# Patient Record
Sex: MALE | Race: WHITE | NOT HISPANIC OR LATINO | Employment: OTHER | RURAL
[De-identification: names, ages, dates, MRNs, and addresses within clinical notes are randomized per-mention and may not be internally consistent; named-entity substitution may affect disease eponyms.]

---

## 2021-04-19 ENCOUNTER — HISTORICAL (OUTPATIENT)
Dept: ADMINISTRATIVE | Facility: HOSPITAL | Age: 51
End: 2021-04-19

## 2021-04-19 LAB
ALBUMIN SERPL BCP-MCNC: 4 G/DL (ref 3.4–5)
ALBUMIN/GLOB SERPL: 1 {RATIO}
ALP SERPL-CCNC: 112 U/L (ref 50–136)
ALT SERPL W P-5'-P-CCNC: 48 U/L (ref 12–78)
ANION GAP SERPL CALCULATED.3IONS-SCNC: 8 MMOL/L
AST SERPL W P-5'-P-CCNC: 28 U/L (ref 15–37)
BASOPHILS # BLD AUTO: 0.05 X10E3/UL (ref 0–0.2)
BASOPHILS NFR BLD AUTO: 0.4 % (ref 0–1)
BILIRUB SERPL-MCNC: 0.3 MG/DL (ref 0.2–1)
BUN SERPL-MCNC: 13 MG/DL (ref 7–18)
CALCIUM SERPL-MCNC: 9.6 MG/DL (ref 8.5–10.1)
CHLORIDE SERPL-SCNC: 101 MMOL/L (ref 101–111)
CK SERPL-CCNC: 252 U/L (ref 26–308)
CO2 SERPL-SCNC: 32 MMOL/L (ref 21–32)
CREAT SERPL-MCNC: 0.9 MG/DL (ref 0.6–1.3)
CRP SERPL-MCNC: 1 MG/DL (ref 0–0.8)
EOSINOPHIL # BLD AUTO: 0.47 X10E3/UL (ref 0–0.5)
EOSINOPHIL NFR BLD AUTO: 3.6 % (ref 1–4)
EOSINOPHIL NFR BLD MANUAL: 2 % (ref 1–4)
ERYTHROCYTE [DISTWIDTH] IN BLOOD BY AUTOMATED COUNT: 13.9 % (ref 11.5–14.5)
ERYTHROCYTE [SEDIMENTATION RATE] IN BLOOD BY WESTERGREN METHOD: 5 MM/H
GLOBULIN SER-MCNC: 3.4 G/DL
GLUCOSE SERPL-MCNC: 253 MG/DL (ref 74–106)
HCT VFR BLD AUTO: 45 % (ref 40–54)
HGB BLD-MCNC: 16 G/DL (ref 13.5–18)
LYMPHOCYTES # BLD AUTO: 3.49 X10E3/UL (ref 1–4.8)
LYMPHOCYTES NFR BLD AUTO: 26.8 % (ref 27–41)
LYMPHOCYTES NFR BLD MANUAL: 26 % (ref 27–41)
MCH RBC QN AUTO: 34.2 PG (ref 27–31)
MCHC RBC AUTO-ENTMCNC: 35.6 G/DL (ref 32–36)
MCV RBC AUTO: 96 FL (ref 80–96)
MONOCYTES # BLD AUTO: 0.77 X10E3/UL (ref 0–0.8)
MONOCYTES NFR BLD AUTO: 5.9 % (ref 2–6)
MONOCYTES NFR BLD MANUAL: 5 % (ref 2–6)
MPC BLD CALC-MCNC: 12.4 FL (ref 9.4–12.4)
NEUTROPHILS # BLD AUTO: 8.25 X10E3/UL (ref 1.8–7.7)
NEUTROPHILS NFR BLD AUTO: 63.3 % (ref 53–65)
NEUTS BAND NFR BLD MANUAL: 1 % (ref 1–5)
NEUTS SEG NFR BLD MANUAL: 66 % (ref 50–62)
PLATELET # BLD AUTO: 197 X10E3/UL (ref 150–400)
POTASSIUM SERPL-SCNC: 5.2 MMOL/L (ref 3.6–5)
PROT SERPL-MCNC: 7.4 G/DL (ref 6.4–8.2)
RBC # BLD AUTO: 4.68 X10E6/UL (ref 4.6–6.2)
SODIUM SERPL-SCNC: 136 MMOL/L (ref 135–145)
WBC # BLD AUTO: 13.03 X10E3/UL (ref 4.5–11)

## 2021-04-26 ENCOUNTER — HISTORICAL (OUTPATIENT)
Dept: ADMINISTRATIVE | Facility: HOSPITAL | Age: 51
End: 2021-04-26

## 2021-04-26 LAB
ALBUMIN SERPL BCP-MCNC: 3.9 G/DL (ref 3.4–5)
ALBUMIN/GLOB SERPL: 1 {RATIO}
ALP SERPL-CCNC: 102 U/L (ref 50–136)
ALT SERPL W P-5'-P-CCNC: 43 U/L (ref 12–78)
ANION GAP SERPL CALCULATED.3IONS-SCNC: 16 MMOL/L
AST SERPL W P-5'-P-CCNC: 20 U/L (ref 15–37)
BASOPHILS # BLD AUTO: 0.06 X10E3/UL (ref 0–0.2)
BASOPHILS NFR BLD AUTO: 0.5 % (ref 0–1)
BILIRUB SERPL-MCNC: 0.3 MG/DL (ref 0.2–1)
BUN SERPL-MCNC: 16 MG/DL (ref 7–18)
CALCIUM SERPL-MCNC: 9 MG/DL (ref 8.5–10.1)
CHLORIDE SERPL-SCNC: 97 MMOL/L (ref 101–111)
CK SERPL-CCNC: 93 U/L (ref 26–308)
CO2 SERPL-SCNC: 26 MMOL/L (ref 21–32)
CREAT SERPL-MCNC: 1.1 MG/DL (ref 0.6–1.3)
CRP SERPL-MCNC: 0.49 MG/DL (ref 0–0.8)
EOSINOPHIL # BLD AUTO: 0.37 X10E3/UL (ref 0–0.5)
EOSINOPHIL NFR BLD AUTO: 3.1 % (ref 1–4)
ERYTHROCYTE [DISTWIDTH] IN BLOOD BY AUTOMATED COUNT: 14.1 % (ref 11.5–14.5)
ERYTHROCYTE [SEDIMENTATION RATE] IN BLOOD BY WESTERGREN METHOD: 8 MM/H
GLOBULIN SER-MCNC: 3.3 G/DL
GLUCOSE SERPL-MCNC: 284 MG/DL (ref 74–106)
HCT VFR BLD AUTO: 44.2 % (ref 40–54)
HGB BLD-MCNC: 15 G/DL (ref 13.5–18)
LYMPHOCYTES # BLD AUTO: 2.89 X10E3/UL (ref 1–4.8)
LYMPHOCYTES NFR BLD AUTO: 23.9 % (ref 27–41)
MCH RBC QN AUTO: 32.8 PG (ref 27–31)
MCHC RBC AUTO-ENTMCNC: 33.9 G/DL (ref 32–36)
MCV RBC AUTO: 97 FL (ref 80–96)
MONOCYTES # BLD AUTO: 0.7 X10E3/UL (ref 0–0.8)
MONOCYTES NFR BLD AUTO: 5.8 % (ref 2–6)
MPC BLD CALC-MCNC: 12 FL (ref 9.4–12.4)
NEUTROPHILS # BLD AUTO: 8.08 X10E3/UL (ref 1.8–7.7)
NEUTROPHILS NFR BLD AUTO: 66.7 % (ref 53–65)
PLATELET # BLD AUTO: 213 X10E3/UL (ref 150–400)
POTASSIUM SERPL-SCNC: 4.3 MMOL/L (ref 3.6–5)
PROT SERPL-MCNC: 7.2 G/DL (ref 6.4–8.2)
RBC # BLD AUTO: 4.58 X10E6/UL (ref 4.6–6.2)
SODIUM SERPL-SCNC: 135 MMOL/L (ref 135–145)
WBC # BLD AUTO: 12.1 X10E3/UL (ref 4.5–11)

## 2021-08-02 ENCOUNTER — OFFICE VISIT (OUTPATIENT)
Dept: FAMILY MEDICINE | Facility: CLINIC | Age: 51
End: 2021-08-02
Payer: COMMERCIAL

## 2021-08-02 VITALS
OXYGEN SATURATION: 96 % | SYSTOLIC BLOOD PRESSURE: 142 MMHG | RESPIRATION RATE: 20 BRPM | DIASTOLIC BLOOD PRESSURE: 98 MMHG | HEART RATE: 85 BPM | BODY MASS INDEX: 29.23 KG/M2 | TEMPERATURE: 97 F | WEIGHT: 204.19 LBS | HEIGHT: 70 IN

## 2021-08-02 DIAGNOSIS — I10 ESSENTIAL HYPERTENSION: ICD-10-CM

## 2021-08-02 DIAGNOSIS — G56.03 CARPAL TUNNEL SYNDROME ON BOTH SIDES: ICD-10-CM

## 2021-08-02 DIAGNOSIS — E11.43 TYPE 2 DIABETES MELLITUS WITH DIABETIC AUTONOMIC NEUROPATHY, WITHOUT LONG-TERM CURRENT USE OF INSULIN: Primary | ICD-10-CM

## 2021-08-02 DIAGNOSIS — M51.9 LUMBAR DISC DISEASE: ICD-10-CM

## 2021-08-02 PROBLEM — E11.49 TYPE 2 DIABETES MELLITUS WITH NEUROLOGIC COMPLICATION, WITHOUT LONG-TERM CURRENT USE OF INSULIN: Status: ACTIVE | Noted: 2021-08-02

## 2021-08-02 LAB
ANION GAP SERPL CALCULATED.3IONS-SCNC: 10 MMOL/L (ref 7–16)
BUN SERPL-MCNC: 10 MG/DL (ref 7–18)
BUN/CREAT SERPL: 14 (ref 6–20)
CALCIUM SERPL-MCNC: 8.7 MG/DL (ref 8.5–10.1)
CHLORIDE SERPL-SCNC: 106 MMOL/L (ref 98–107)
CHOLEST SERPL-MCNC: 179 MG/DL (ref 0–200)
CHOLEST/HDLC SERPL: 7.5 {RATIO}
CO2 SERPL-SCNC: 29 MMOL/L (ref 21–32)
CREAT SERPL-MCNC: 0.73 MG/DL (ref 0.7–1.3)
CREAT UR-MCNC: 285 MG/DL (ref 39–259)
EST. AVERAGE GLUCOSE BLD GHB EST-MCNC: 117 MG/DL
GLUCOSE SERPL-MCNC: 87 MG/DL (ref 74–106)
HBA1C MFR BLD HPLC: 6.1 % (ref 4.5–6.6)
HDLC SERPL-MCNC: 24 MG/DL (ref 40–60)
LDLC SERPL CALC-MCNC: ABNORMAL MG/DL
LDLC/HDLC SERPL: ABNORMAL {RATIO}
MICROALBUMIN UR-MCNC: 8.2 MG/DL (ref 0–2.8)
MICROALBUMIN/CREAT RATIO PNL UR: 28.8 MG/G (ref 0–30)
NONHDLC SERPL-MCNC: 155 MG/DL
POTASSIUM SERPL-SCNC: 4 MMOL/L (ref 3.5–5.1)
SODIUM SERPL-SCNC: 141 MMOL/L (ref 136–145)
TRIGL SERPL-MCNC: 423 MG/DL (ref 35–150)
VLDLC SERPL-MCNC: ABNORMAL MG/DL

## 2021-08-02 PROCEDURE — 1160F PR REVIEW ALL MEDS BY PRESCRIBER/CLIN PHARMACIST DOCUMENTED: ICD-10-PCS | Mod: CPTII,,, | Performed by: FAMILY MEDICINE

## 2021-08-02 PROCEDURE — 3008F BODY MASS INDEX DOCD: CPT | Mod: CPTII,,, | Performed by: FAMILY MEDICINE

## 2021-08-02 PROCEDURE — 83036 HEMOGLOBIN GLYCOSYLATED A1C: CPT | Mod: ,,, | Performed by: CLINICAL MEDICAL LABORATORY

## 2021-08-02 PROCEDURE — 3044F HG A1C LEVEL LT 7.0%: CPT | Mod: CPTII,,, | Performed by: FAMILY MEDICINE

## 2021-08-02 PROCEDURE — 99204 PR OFFICE/OUTPT VISIT, NEW, LEVL IV, 45-59 MIN: ICD-10-PCS | Mod: ,,, | Performed by: FAMILY MEDICINE

## 2021-08-02 PROCEDURE — 1160F RVW MEDS BY RX/DR IN RCRD: CPT | Mod: CPTII,,, | Performed by: FAMILY MEDICINE

## 2021-08-02 PROCEDURE — 80061 LIPID PANEL: ICD-10-PCS | Mod: ,,, | Performed by: CLINICAL MEDICAL LABORATORY

## 2021-08-02 PROCEDURE — 1159F MED LIST DOCD IN RCRD: CPT | Mod: CPTII,,, | Performed by: FAMILY MEDICINE

## 2021-08-02 PROCEDURE — 1125F AMNT PAIN NOTED PAIN PRSNT: CPT | Mod: CPTII,,, | Performed by: FAMILY MEDICINE

## 2021-08-02 PROCEDURE — 82043 UR ALBUMIN QUANTITATIVE: CPT | Mod: ,,, | Performed by: CLINICAL MEDICAL LABORATORY

## 2021-08-02 PROCEDURE — 3008F PR BODY MASS INDEX (BMI) DOCUMENTED: ICD-10-PCS | Mod: CPTII,,, | Performed by: FAMILY MEDICINE

## 2021-08-02 PROCEDURE — 82570 ASSAY OF URINE CREATININE: CPT | Mod: ,,, | Performed by: CLINICAL MEDICAL LABORATORY

## 2021-08-02 PROCEDURE — 80048 BASIC METABOLIC PANEL: ICD-10-PCS | Mod: ,,, | Performed by: CLINICAL MEDICAL LABORATORY

## 2021-08-02 PROCEDURE — 3044F PR MOST RECENT HEMOGLOBIN A1C LEVEL <7.0%: ICD-10-PCS | Mod: CPTII,,, | Performed by: FAMILY MEDICINE

## 2021-08-02 PROCEDURE — 3080F PR MOST RECENT DIASTOLIC BLOOD PRESSURE >= 90 MM HG: ICD-10-PCS | Mod: CPTII,,, | Performed by: FAMILY MEDICINE

## 2021-08-02 PROCEDURE — 1159F PR MEDICATION LIST DOCUMENTED IN MEDICAL RECORD: ICD-10-PCS | Mod: CPTII,,, | Performed by: FAMILY MEDICINE

## 2021-08-02 PROCEDURE — 1125F PR PAIN SEVERITY QUANTIFIED, PAIN PRESENT: ICD-10-PCS | Mod: CPTII,,, | Performed by: FAMILY MEDICINE

## 2021-08-02 PROCEDURE — 80061 LIPID PANEL: CPT | Mod: ,,, | Performed by: CLINICAL MEDICAL LABORATORY

## 2021-08-02 PROCEDURE — 99204 OFFICE O/P NEW MOD 45 MIN: CPT | Mod: ,,, | Performed by: FAMILY MEDICINE

## 2021-08-02 PROCEDURE — 82570 MICROALBUMIN / CREATININE RATIO URINE: ICD-10-PCS | Mod: ,,, | Performed by: CLINICAL MEDICAL LABORATORY

## 2021-08-02 PROCEDURE — 83036 HEMOGLOBIN A1C: ICD-10-PCS | Mod: ,,, | Performed by: CLINICAL MEDICAL LABORATORY

## 2021-08-02 PROCEDURE — 3077F PR MOST RECENT SYSTOLIC BLOOD PRESSURE >= 140 MM HG: ICD-10-PCS | Mod: CPTII,,, | Performed by: FAMILY MEDICINE

## 2021-08-02 PROCEDURE — 3080F DIAST BP >= 90 MM HG: CPT | Mod: CPTII,,, | Performed by: FAMILY MEDICINE

## 2021-08-02 PROCEDURE — 82043 MICROALBUMIN / CREATININE RATIO URINE: ICD-10-PCS | Mod: ,,, | Performed by: CLINICAL MEDICAL LABORATORY

## 2021-08-02 PROCEDURE — 3077F SYST BP >= 140 MM HG: CPT | Mod: CPTII,,, | Performed by: FAMILY MEDICINE

## 2021-08-02 PROCEDURE — 80048 BASIC METABOLIC PNL TOTAL CA: CPT | Mod: ,,, | Performed by: CLINICAL MEDICAL LABORATORY

## 2021-08-02 RX ORDER — GABAPENTIN 600 MG/1
TABLET ORAL
COMMUNITY

## 2021-08-02 RX ORDER — GLIMEPIRIDE 2 MG/1
TABLET ORAL 2 TIMES DAILY
COMMUNITY
End: 2021-08-02 | Stop reason: SDUPTHER

## 2021-08-02 RX ORDER — METFORMIN HYDROCHLORIDE 500 MG/1
1000 TABLET, EXTENDED RELEASE ORAL 2 TIMES DAILY WITH MEALS
Qty: 180 TABLET | Refills: 1 | Status: SHIPPED | OUTPATIENT
Start: 2021-08-02 | End: 2022-02-16 | Stop reason: SDUPTHER

## 2021-08-02 RX ORDER — HYDROCODONE BITARTRATE AND ACETAMINOPHEN 10; 325 MG/1; MG/1
TABLET ORAL
COMMUNITY
End: 2023-05-05 | Stop reason: ALTCHOICE

## 2021-08-02 RX ORDER — FLASH GLUCOSE SCANNING READER
1 EACH MISCELLANEOUS 4 TIMES DAILY
Qty: 1 EACH | Refills: 5 | Status: SHIPPED | OUTPATIENT
Start: 2021-08-02 | End: 2021-10-20

## 2021-08-02 RX ORDER — BACLOFEN 20 MG/1
20 TABLET ORAL EVERY 6 HOURS PRN
COMMUNITY

## 2021-08-02 RX ORDER — FLASH GLUCOSE SENSOR
1 KIT MISCELLANEOUS 4 TIMES DAILY
Qty: 6 KIT | Refills: 5 | Status: SHIPPED | OUTPATIENT
Start: 2021-08-02 | End: 2021-10-20

## 2021-08-02 RX ORDER — GLIMEPIRIDE 2 MG/1
4 TABLET ORAL 2 TIMES DAILY
Qty: 180 TABLET | Refills: 1 | Status: SHIPPED | OUTPATIENT
Start: 2021-08-02 | End: 2022-02-16 | Stop reason: SDUPTHER

## 2021-08-02 RX ORDER — ROPINIROLE 2 MG/1
TABLET, FILM COATED ORAL DAILY
COMMUNITY
End: 2021-10-20

## 2021-08-02 RX ORDER — METFORMIN HYDROCHLORIDE 500 MG/1
TABLET, EXTENDED RELEASE ORAL 4 TIMES DAILY
COMMUNITY
End: 2021-08-02 | Stop reason: SDUPTHER

## 2021-08-08 PROBLEM — I10 ESSENTIAL HYPERTENSION: Status: ACTIVE | Noted: 2021-08-08

## 2021-08-08 RX ORDER — AMLODIPINE BESYLATE 5 MG/1
5 TABLET ORAL DAILY
Qty: 90 TABLET | Refills: 1 | Status: SHIPPED | OUTPATIENT
Start: 2021-08-08 | End: 2022-02-16 | Stop reason: SDUPTHER

## 2021-08-12 ENCOUNTER — PATIENT MESSAGE (OUTPATIENT)
Dept: FAMILY MEDICINE | Facility: CLINIC | Age: 51
End: 2021-08-12

## 2021-08-16 DIAGNOSIS — E11.29 MICROALBUMINURIA DUE TO TYPE 2 DIABETES MELLITUS: Primary | ICD-10-CM

## 2021-08-16 DIAGNOSIS — R80.9 MICROALBUMINURIA DUE TO TYPE 2 DIABETES MELLITUS: Primary | ICD-10-CM

## 2021-10-20 ENCOUNTER — OFFICE VISIT (OUTPATIENT)
Dept: FAMILY MEDICINE | Facility: CLINIC | Age: 51
End: 2021-10-20
Payer: COMMERCIAL

## 2021-10-20 VITALS
WEIGHT: 206.63 LBS | TEMPERATURE: 98 F | SYSTOLIC BLOOD PRESSURE: 128 MMHG | RESPIRATION RATE: 22 BRPM | DIASTOLIC BLOOD PRESSURE: 74 MMHG | BODY MASS INDEX: 29.58 KG/M2 | HEART RATE: 84 BPM | OXYGEN SATURATION: 98 % | HEIGHT: 70 IN

## 2021-10-20 DIAGNOSIS — H60.391 OTHER INFECTIVE ACUTE OTITIS EXTERNA OF RIGHT EAR: Primary | ICD-10-CM

## 2021-10-20 PROBLEM — H60.501 ACUTE OTITIS EXTERNA OF RIGHT EAR: Status: ACTIVE | Noted: 2021-10-20

## 2021-10-20 PROCEDURE — 3074F PR MOST RECENT SYSTOLIC BLOOD PRESSURE < 130 MM HG: ICD-10-PCS | Mod: CPTII,,, | Performed by: FAMILY MEDICINE

## 2021-10-20 PROCEDURE — 3008F BODY MASS INDEX DOCD: CPT | Mod: CPTII,,, | Performed by: FAMILY MEDICINE

## 2021-10-20 PROCEDURE — 3061F PR NEG MICROALBUMINURIA RESULT DOCUMENTED/REVIEW: ICD-10-PCS | Mod: CPTII,,, | Performed by: FAMILY MEDICINE

## 2021-10-20 PROCEDURE — 3061F NEG MICROALBUMINURIA REV: CPT | Mod: CPTII,,, | Performed by: FAMILY MEDICINE

## 2021-10-20 PROCEDURE — 3074F SYST BP LT 130 MM HG: CPT | Mod: CPTII,,, | Performed by: FAMILY MEDICINE

## 2021-10-20 PROCEDURE — 1159F PR MEDICATION LIST DOCUMENTED IN MEDICAL RECORD: ICD-10-PCS | Mod: CPTII,,, | Performed by: FAMILY MEDICINE

## 2021-10-20 PROCEDURE — 3044F HG A1C LEVEL LT 7.0%: CPT | Mod: CPTII,,, | Performed by: FAMILY MEDICINE

## 2021-10-20 PROCEDURE — 3078F PR MOST RECENT DIASTOLIC BLOOD PRESSURE < 80 MM HG: ICD-10-PCS | Mod: CPTII,,, | Performed by: FAMILY MEDICINE

## 2021-10-20 PROCEDURE — 99213 OFFICE O/P EST LOW 20 MIN: CPT | Mod: ,,, | Performed by: FAMILY MEDICINE

## 2021-10-20 PROCEDURE — 3044F PR MOST RECENT HEMOGLOBIN A1C LEVEL <7.0%: ICD-10-PCS | Mod: CPTII,,, | Performed by: FAMILY MEDICINE

## 2021-10-20 PROCEDURE — 3078F DIAST BP <80 MM HG: CPT | Mod: CPTII,,, | Performed by: FAMILY MEDICINE

## 2021-10-20 PROCEDURE — 3066F PR DOCUMENTATION OF TREATMENT FOR NEPHROPATHY: ICD-10-PCS | Mod: CPTII,,, | Performed by: FAMILY MEDICINE

## 2021-10-20 PROCEDURE — 99213 PR OFFICE/OUTPT VISIT, EST, LEVL III, 20-29 MIN: ICD-10-PCS | Mod: ,,, | Performed by: FAMILY MEDICINE

## 2021-10-20 PROCEDURE — 1159F MED LIST DOCD IN RCRD: CPT | Mod: CPTII,,, | Performed by: FAMILY MEDICINE

## 2021-10-20 PROCEDURE — 3008F PR BODY MASS INDEX (BMI) DOCUMENTED: ICD-10-PCS | Mod: CPTII,,, | Performed by: FAMILY MEDICINE

## 2021-10-20 PROCEDURE — 3066F NEPHROPATHY DOC TX: CPT | Mod: CPTII,,, | Performed by: FAMILY MEDICINE

## 2021-10-20 RX ORDER — NEOMYCIN SULFATE, POLYMYXIN B SULFATE AND HYDROCORTISONE 10; 3.5; 1 MG/ML; MG/ML; [USP'U]/ML
3 SUSPENSION/ DROPS AURICULAR (OTIC) 3 TIMES DAILY
Qty: 3.5 ML | Refills: 2 | Status: SHIPPED | OUTPATIENT
Start: 2021-10-20 | End: 2021-10-27

## 2021-10-20 RX ORDER — SULFAMETHOXAZOLE AND TRIMETHOPRIM 800; 160 MG/1; MG/1
1 TABLET ORAL 2 TIMES DAILY
Qty: 14 TABLET | Refills: 0 | Status: SHIPPED | OUTPATIENT
Start: 2021-10-20 | End: 2021-11-03 | Stop reason: ALTCHOICE

## 2021-11-03 ENCOUNTER — OFFICE VISIT (OUTPATIENT)
Dept: FAMILY MEDICINE | Facility: CLINIC | Age: 51
End: 2021-11-03
Payer: COMMERCIAL

## 2021-11-03 VITALS
DIASTOLIC BLOOD PRESSURE: 78 MMHG | BODY MASS INDEX: 29.63 KG/M2 | RESPIRATION RATE: 22 BRPM | WEIGHT: 207 LBS | TEMPERATURE: 97 F | HEART RATE: 74 BPM | HEIGHT: 70 IN | SYSTOLIC BLOOD PRESSURE: 120 MMHG | OXYGEN SATURATION: 96 %

## 2021-11-03 DIAGNOSIS — J01.40 ACUTE NON-RECURRENT PANSINUSITIS: Primary | ICD-10-CM

## 2021-11-03 PROCEDURE — 3008F BODY MASS INDEX DOCD: CPT | Mod: CPTII,,, | Performed by: FAMILY MEDICINE

## 2021-11-03 PROCEDURE — 3061F NEG MICROALBUMINURIA REV: CPT | Mod: CPTII,,, | Performed by: FAMILY MEDICINE

## 2021-11-03 PROCEDURE — 99213 OFFICE O/P EST LOW 20 MIN: CPT | Mod: 25,,, | Performed by: FAMILY MEDICINE

## 2021-11-03 PROCEDURE — 99213 PR OFFICE/OUTPT VISIT, EST, LEVL III, 20-29 MIN: ICD-10-PCS | Mod: 25,,, | Performed by: FAMILY MEDICINE

## 2021-11-03 PROCEDURE — 1159F PR MEDICATION LIST DOCUMENTED IN MEDICAL RECORD: ICD-10-PCS | Mod: CPTII,,, | Performed by: FAMILY MEDICINE

## 2021-11-03 PROCEDURE — 96372 PR INJECTION,THERAP/PROPH/DIAG2ST, IM OR SUBCUT: ICD-10-PCS | Mod: ,,, | Performed by: FAMILY MEDICINE

## 2021-11-03 PROCEDURE — 3066F NEPHROPATHY DOC TX: CPT | Mod: CPTII,,, | Performed by: FAMILY MEDICINE

## 2021-11-03 PROCEDURE — 3078F PR MOST RECENT DIASTOLIC BLOOD PRESSURE < 80 MM HG: ICD-10-PCS | Mod: CPTII,,, | Performed by: FAMILY MEDICINE

## 2021-11-03 PROCEDURE — 3044F HG A1C LEVEL LT 7.0%: CPT | Mod: CPTII,,, | Performed by: FAMILY MEDICINE

## 2021-11-03 PROCEDURE — 3061F PR NEG MICROALBUMINURIA RESULT DOCUMENTED/REVIEW: ICD-10-PCS | Mod: CPTII,,, | Performed by: FAMILY MEDICINE

## 2021-11-03 PROCEDURE — 3008F PR BODY MASS INDEX (BMI) DOCUMENTED: ICD-10-PCS | Mod: CPTII,,, | Performed by: FAMILY MEDICINE

## 2021-11-03 PROCEDURE — 3074F SYST BP LT 130 MM HG: CPT | Mod: CPTII,,, | Performed by: FAMILY MEDICINE

## 2021-11-03 PROCEDURE — 3066F PR DOCUMENTATION OF TREATMENT FOR NEPHROPATHY: ICD-10-PCS | Mod: CPTII,,, | Performed by: FAMILY MEDICINE

## 2021-11-03 PROCEDURE — 1159F MED LIST DOCD IN RCRD: CPT | Mod: CPTII,,, | Performed by: FAMILY MEDICINE

## 2021-11-03 PROCEDURE — 3078F DIAST BP <80 MM HG: CPT | Mod: CPTII,,, | Performed by: FAMILY MEDICINE

## 2021-11-03 PROCEDURE — 96372 THER/PROPH/DIAG INJ SC/IM: CPT | Mod: ,,, | Performed by: FAMILY MEDICINE

## 2021-11-03 PROCEDURE — 3044F PR MOST RECENT HEMOGLOBIN A1C LEVEL <7.0%: ICD-10-PCS | Mod: CPTII,,, | Performed by: FAMILY MEDICINE

## 2021-11-03 PROCEDURE — 3074F PR MOST RECENT SYSTOLIC BLOOD PRESSURE < 130 MM HG: ICD-10-PCS | Mod: CPTII,,, | Performed by: FAMILY MEDICINE

## 2021-11-03 RX ORDER — AZITHROMYCIN 250 MG/1
TABLET, FILM COATED ORAL
Qty: 6 TABLET | Refills: 0 | Status: SHIPPED | OUTPATIENT
Start: 2021-11-03 | End: 2021-11-08

## 2021-11-03 RX ORDER — METHYLPREDNISOLONE ACETATE 40 MG/ML
40 INJECTION, SUSPENSION INTRA-ARTICULAR; INTRALESIONAL; INTRAMUSCULAR; SOFT TISSUE
Status: COMPLETED | OUTPATIENT
Start: 2021-11-03 | End: 2021-11-03

## 2021-11-03 RX ORDER — CEFTRIAXONE 1 G/1
1 INJECTION, POWDER, FOR SOLUTION INTRAMUSCULAR; INTRAVENOUS
Status: COMPLETED | OUTPATIENT
Start: 2021-11-03 | End: 2021-11-03

## 2021-11-03 RX ORDER — LORATADINE AND PSEUDOEPHEDRINE SULFATE 5; 120 MG/1; MG/1
1 TABLET, EXTENDED RELEASE ORAL 2 TIMES DAILY
Qty: 20 TABLET | Refills: 0 | Status: SHIPPED | OUTPATIENT
Start: 2021-11-03 | End: 2021-11-13

## 2021-11-03 RX ADMIN — METHYLPREDNISOLONE ACETATE 40 MG: 40 INJECTION, SUSPENSION INTRA-ARTICULAR; INTRALESIONAL; INTRAMUSCULAR; SOFT TISSUE at 04:11

## 2021-11-03 RX ADMIN — CEFTRIAXONE 1 G: 1 INJECTION, POWDER, FOR SOLUTION INTRAMUSCULAR; INTRAVENOUS at 04:11

## 2021-11-12 PROBLEM — J01.40 ACUTE NON-RECURRENT PANSINUSITIS: Status: ACTIVE | Noted: 2021-11-12

## 2022-02-14 PROBLEM — J01.40 ACUTE NON-RECURRENT PANSINUSITIS: Status: RESOLVED | Noted: 2021-11-12 | Resolved: 2022-02-14

## 2022-02-16 ENCOUNTER — OFFICE VISIT (OUTPATIENT)
Dept: FAMILY MEDICINE | Facility: CLINIC | Age: 52
End: 2022-02-16
Payer: COMMERCIAL

## 2022-02-16 VITALS
DIASTOLIC BLOOD PRESSURE: 88 MMHG | TEMPERATURE: 97 F | BODY MASS INDEX: 29.37 KG/M2 | RESPIRATION RATE: 16 BRPM | HEART RATE: 82 BPM | OXYGEN SATURATION: 97 % | HEIGHT: 70 IN | SYSTOLIC BLOOD PRESSURE: 134 MMHG | WEIGHT: 205.19 LBS

## 2022-02-16 DIAGNOSIS — M51.9 LUMBAR DISC DISEASE: Primary | ICD-10-CM

## 2022-02-16 DIAGNOSIS — E11.43 TYPE 2 DIABETES MELLITUS WITH DIABETIC AUTONOMIC NEUROPATHY, WITHOUT LONG-TERM CURRENT USE OF INSULIN: ICD-10-CM

## 2022-02-16 DIAGNOSIS — E78.2 MIXED HYPERLIPIDEMIA: ICD-10-CM

## 2022-02-16 DIAGNOSIS — I10 ESSENTIAL HYPERTENSION: ICD-10-CM

## 2022-02-16 LAB
ALBUMIN SERPL BCP-MCNC: 4 G/DL (ref 3.5–5)
ALBUMIN/GLOB SERPL: 1.2 {RATIO}
ALP SERPL-CCNC: 77 U/L (ref 45–115)
ALT SERPL W P-5'-P-CCNC: 36 U/L (ref 16–61)
ANION GAP SERPL CALCULATED.3IONS-SCNC: 9 MMOL/L (ref 7–16)
AST SERPL W P-5'-P-CCNC: 20 U/L (ref 15–37)
BASOPHILS # BLD AUTO: 0.12 K/UL (ref 0–0.2)
BASOPHILS NFR BLD AUTO: 1.1 % (ref 0–1)
BILIRUB SERPL-MCNC: 0.5 MG/DL (ref 0–1.2)
BUN SERPL-MCNC: 12 MG/DL (ref 7–18)
BUN/CREAT SERPL: 14 (ref 6–20)
CALCIUM SERPL-MCNC: 9.2 MG/DL (ref 8.5–10.1)
CHLORIDE SERPL-SCNC: 105 MMOL/L (ref 98–107)
CHOLEST SERPL-MCNC: 108 MG/DL (ref 0–200)
CHOLEST/HDLC SERPL: 2.8 {RATIO}
CO2 SERPL-SCNC: 31 MMOL/L (ref 21–32)
CREAT SERPL-MCNC: 0.86 MG/DL (ref 0.7–1.3)
CREAT UR-MCNC: 228 MG/DL (ref 39–259)
DIFFERENTIAL METHOD BLD: ABNORMAL
EOSINOPHIL # BLD AUTO: 0.78 K/UL (ref 0–0.5)
EOSINOPHIL NFR BLD AUTO: 7.4 % (ref 1–4)
ERYTHROCYTE [DISTWIDTH] IN BLOOD BY AUTOMATED COUNT: 13.2 % (ref 11.5–14.5)
EST. AVERAGE GLUCOSE BLD GHB EST-MCNC: 120 MG/DL
GLOBULIN SER-MCNC: 3.4 G/DL (ref 2–4)
GLUCOSE SERPL-MCNC: 115 MG/DL (ref 74–106)
HBA1C MFR BLD HPLC: 6.2 % (ref 4.5–6.6)
HCT VFR BLD AUTO: 43.5 % (ref 40–54)
HDLC SERPL-MCNC: 39 MG/DL (ref 40–60)
HGB BLD-MCNC: 14.7 G/DL (ref 13.5–18)
IMM GRANULOCYTES # BLD AUTO: 0.03 K/UL (ref 0–0.04)
IMM GRANULOCYTES NFR BLD: 0.3 % (ref 0–0.4)
LDLC SERPL CALC-MCNC: 41 MG/DL
LDLC/HDLC SERPL: 1.1 {RATIO}
LYMPHOCYTES # BLD AUTO: 3.24 K/UL (ref 1–4.8)
LYMPHOCYTES NFR BLD AUTO: 30.8 % (ref 27–41)
MCH RBC QN AUTO: 32.2 PG (ref 27–31)
MCHC RBC AUTO-ENTMCNC: 33.8 G/DL (ref 32–36)
MCV RBC AUTO: 95.2 FL (ref 80–96)
MICROALBUMIN UR-MCNC: 8 MG/DL (ref 0–2.8)
MICROALBUMIN/CREAT RATIO PNL UR: 35.1 MG/G (ref 0–30)
MONOCYTES # BLD AUTO: 0.72 K/UL (ref 0–0.8)
MONOCYTES NFR BLD AUTO: 6.9 % (ref 2–6)
MPC BLD CALC-MCNC: 11.6 FL (ref 9.4–12.4)
NEUTROPHILS # BLD AUTO: 5.62 K/UL (ref 1.8–7.7)
NEUTROPHILS NFR BLD AUTO: 53.5 % (ref 53–65)
NONHDLC SERPL-MCNC: 69 MG/DL
NRBC # BLD AUTO: 0 X10E3/UL
NRBC, AUTO (.00): 0 %
PLATELET # BLD AUTO: 211 K/UL (ref 150–400)
POTASSIUM SERPL-SCNC: 4.5 MMOL/L (ref 3.5–5.1)
PROT SERPL-MCNC: 7.4 G/DL (ref 6.4–8.2)
RBC # BLD AUTO: 4.57 M/UL (ref 4.6–6.2)
SODIUM SERPL-SCNC: 140 MMOL/L (ref 136–145)
TRIGL SERPL-MCNC: 139 MG/DL (ref 35–150)
VLDLC SERPL-MCNC: 28 MG/DL
WBC # BLD AUTO: 10.51 K/UL (ref 4.5–11)

## 2022-02-16 PROCEDURE — 3075F PR MOST RECENT SYSTOLIC BLOOD PRESS GE 130-139MM HG: ICD-10-PCS | Mod: CPTII,,, | Performed by: FAMILY MEDICINE

## 2022-02-16 PROCEDURE — 3079F PR MOST RECENT DIASTOLIC BLOOD PRESSURE 80-89 MM HG: ICD-10-PCS | Mod: CPTII,,, | Performed by: FAMILY MEDICINE

## 2022-02-16 PROCEDURE — 82043 UR ALBUMIN QUANTITATIVE: CPT | Mod: ,,, | Performed by: CLINICAL MEDICAL LABORATORY

## 2022-02-16 PROCEDURE — 85025 COMPLETE CBC W/AUTO DIFF WBC: CPT | Mod: ,,, | Performed by: CLINICAL MEDICAL LABORATORY

## 2022-02-16 PROCEDURE — 80061 LIPID PANEL: CPT | Mod: ,,, | Performed by: CLINICAL MEDICAL LABORATORY

## 2022-02-16 PROCEDURE — 80053 COMPREHENSIVE METABOLIC PANEL: ICD-10-PCS | Mod: ,,, | Performed by: CLINICAL MEDICAL LABORATORY

## 2022-02-16 PROCEDURE — 3008F BODY MASS INDEX DOCD: CPT | Mod: CPTII,,, | Performed by: FAMILY MEDICINE

## 2022-02-16 PROCEDURE — 99214 PR OFFICE/OUTPT VISIT, EST, LEVL IV, 30-39 MIN: ICD-10-PCS | Mod: ,,, | Performed by: FAMILY MEDICINE

## 2022-02-16 PROCEDURE — 3075F SYST BP GE 130 - 139MM HG: CPT | Mod: CPTII,,, | Performed by: FAMILY MEDICINE

## 2022-02-16 PROCEDURE — 82570 ASSAY OF URINE CREATININE: CPT | Mod: ,,, | Performed by: CLINICAL MEDICAL LABORATORY

## 2022-02-16 PROCEDURE — 85025 CBC WITH DIFFERENTIAL: ICD-10-PCS | Mod: ,,, | Performed by: CLINICAL MEDICAL LABORATORY

## 2022-02-16 PROCEDURE — 80061 LIPID PANEL: ICD-10-PCS | Mod: ,,, | Performed by: CLINICAL MEDICAL LABORATORY

## 2022-02-16 PROCEDURE — 99214 OFFICE O/P EST MOD 30 MIN: CPT | Mod: ,,, | Performed by: FAMILY MEDICINE

## 2022-02-16 PROCEDURE — 82570 MICROALBUMIN / CREATININE RATIO URINE: ICD-10-PCS | Mod: ,,, | Performed by: CLINICAL MEDICAL LABORATORY

## 2022-02-16 PROCEDURE — 83036 HEMOGLOBIN A1C: ICD-10-PCS | Mod: ,,, | Performed by: CLINICAL MEDICAL LABORATORY

## 2022-02-16 PROCEDURE — 3079F DIAST BP 80-89 MM HG: CPT | Mod: CPTII,,, | Performed by: FAMILY MEDICINE

## 2022-02-16 PROCEDURE — 3008F PR BODY MASS INDEX (BMI) DOCUMENTED: ICD-10-PCS | Mod: CPTII,,, | Performed by: FAMILY MEDICINE

## 2022-02-16 PROCEDURE — 82043 MICROALBUMIN / CREATININE RATIO URINE: ICD-10-PCS | Mod: ,,, | Performed by: CLINICAL MEDICAL LABORATORY

## 2022-02-16 PROCEDURE — 80053 COMPREHEN METABOLIC PANEL: CPT | Mod: ,,, | Performed by: CLINICAL MEDICAL LABORATORY

## 2022-02-16 PROCEDURE — 83036 HEMOGLOBIN GLYCOSYLATED A1C: CPT | Mod: ,,, | Performed by: CLINICAL MEDICAL LABORATORY

## 2022-02-16 RX ORDER — AMLODIPINE BESYLATE 5 MG/1
5 TABLET ORAL DAILY
Qty: 90 TABLET | Refills: 1 | Status: SHIPPED | OUTPATIENT
Start: 2022-02-16 | End: 2022-08-18

## 2022-02-16 RX ORDER — METFORMIN HYDROCHLORIDE 500 MG/1
1000 TABLET, EXTENDED RELEASE ORAL 2 TIMES DAILY WITH MEALS
Qty: 180 TABLET | Refills: 1 | Status: SHIPPED | OUTPATIENT
Start: 2022-02-16 | End: 2022-08-18

## 2022-02-16 RX ORDER — GLIMEPIRIDE 2 MG/1
4 TABLET ORAL 2 TIMES DAILY
Qty: 180 TABLET | Refills: 1 | Status: SHIPPED | OUTPATIENT
Start: 2022-02-16 | End: 2023-03-17

## 2022-02-16 NOTE — PROGRESS NOTES
Cam Sanon DO   [address] [place]  [address]      PATIENT NAME: Bruno Levy  : 1970  DATE: 22  MRN: [id number]      Billing Provider: Cam Sanon DO  Level of Service:   Patient PCP Information     Provider PCP Type    Cam Sanon DO General          Reason for Visit / Chief Complaint: Hypertension (Patient needs refills. Patient is fasting in case he needs labs. ) and Diabetes (Patient states he needs refills. )       Update PCP  Update Chief Complaint         History of Present Illness / Problem Focused Workflow     Bruno Levy presents to the clinic with Hypertension (Patient needs refills. Patient is fasting in case he needs labs. ) and Diabetes (Patient states he needs refills. )     No covid vaccine.  Patient does in today for follow-up on his hypertension hyperlipidemia as well as blood sugars.  He states his blood sugars have been less than 150 for the most part.  He does check his sugars several times weekly.  He denies any chest pain shortness of breath palpitations.  No fever chills or sweats.  He states he has not had cavity with no family members have.  He has not had any immunizations for COVID        Review of Systems     Review of Systems   Constitutional: Negative for activity change, appetite change, chills, fatigue and fever.   HENT: Negative for nasal congestion, ear discharge, ear pain, mouth dryness, mouth sores, postnasal drip, sinus pressure/congestion, sore throat and voice change.    Eyes: Negative for pain, discharge, redness, itching and visual disturbance.   Respiratory: Negative for apnea, cough, chest tightness, shortness of breath and wheezing.    Cardiovascular: Negative for chest pain, palpitations and leg swelling.   Gastrointestinal: Negative for abdominal distention, abdominal pain, anal bleeding, blood in stool, change in bowel habit, constipation, diarrhea, nausea, vomiting, reflux and change in bowel habit.    Endocrine: Negative for cold intolerance, heat intolerance, polydipsia, polyphagia and polyuria.   Genitourinary: Negative for difficulty urinating, enuresis, erectile dysfunction, frequency, genital sores, hematuria, hot flashes, menstrual irregularity, urgency and vaginal dryness.   Musculoskeletal: Positive for arthralgias and back pain. Negative for gait problem, leg pain, myalgias and neck pain.   Integumentary:  Negative for rash, mole/lesion, breast mass, breast discharge and breast tenderness.   Allergic/Immunologic: Negative for environmental allergies and food allergies.   Neurological: Negative for dizziness, vertigo, tremors, seizures, syncope, facial asymmetry, speech difficulty, weakness, light-headedness, numbness, headaches, disturbances in coordination, memory loss and coordination difficulties.   Hematological: Negative for adenopathy. Does not bruise/bleed easily.   Psychiatric/Behavioral: Negative for agitation, behavioral problems, confusion, decreased concentration, dysphoric mood, hallucinations, self-injury, sleep disturbance and suicidal ideas. The patient is not nervous/anxious and is not hyperactive.    Breast: Negative for mass and tenderness      Medical / Social / Family History     Past Medical History:   Diagnosis Date    Depression     Diabetes mellitus, type 2     History of methicillin resistant staphylococcus aureus (MRSA)     Hyperlipidemia     Hypertension     Neuropathy     Osteomyelitis        Past Surgical History:   Procedure Laterality Date    FRACTURE SURGERY  1976    arm    LUMBAR FUSION      NASAL SEPTUM SURGERY      SPINAL CORD STIMULATOR IMPLANT      SPINAL CORD STIMULATOR REMOVAL      TOE AMPUTATION      rt foot great to and 5th toe;lt foot great,4th and 5th toes.       Social History    reports that he has been smoking cigarettes. He started smoking about 35 years ago. He has been smoking about 1.00 pack per day. He has never used smokeless tobacco.  He reports previous alcohol use. He reports that he does not use drugs.    Family History  's family history includes Bladder Cancer in his father; COPD in his mother; Hypertension in his brother; Lung cancer in his father.    Medications and Allergies     Medications  Outpatient Medications Marked as Taking for the 2/16/22 encounter (Office Visit) with Cam Sanon, DO   Medication Sig Dispense Refill    baclofen (LIORESAL) 20 MG tablet Take 20 mg by mouth every 6 (six) hours as needed.      gabapentin (NEURONTIN) 600 MG tablet 1.5 tablets every 6 hours      HYDROcodone-acetaminophen (NORCO)  mg per tablet hydrocodone 10 mg-acetaminophen 325 mg tablet   TAKE 1 TABLET BY MOUTH EVERY 8 HOURS AS NEEDED FOR PAIN MAY CAUSE DROWSINESS      [DISCONTINUED] amLODIPine (NORVASC) 5 MG tablet Take 1 tablet (5 mg total) by mouth once daily. 90 tablet 1    [DISCONTINUED] metFORMIN (GLUCOPHAGE-XR) 500 MG ER 24hr tablet Take 2 tablets (1,000 mg total) by mouth 2 (two) times daily with meals. 180 tablet 1       Allergies  Review of patient's allergies indicates:   Allergen Reactions    Levofloxacin (bulk) Other (See Comments)     Worsened muscle spasms       Physical Examination     Vitals:    02/16/22 0831   BP: 134/88   Pulse: 82   Resp: 16   Temp: 97.1 °F (36.2 °C)     Physical Exam  Constitutional:       General: He is not in acute distress.     Appearance: Normal appearance. He is obese.   HENT:      Head: Normocephalic.      Nose: Nose normal.      Mouth/Throat:      Mouth: Mucous membranes are moist.      Pharynx: Oropharynx is clear. No oropharyngeal exudate or posterior oropharyngeal erythema.   Eyes:      General: No scleral icterus.        Right eye: No discharge.         Left eye: No discharge.      Conjunctiva/sclera: Conjunctivae normal.      Pupils: Pupils are equal, round, and reactive to light.   Neck:      Vascular: No carotid bruit.   Cardiovascular:      Rate and Rhythm: Normal rate and  regular rhythm.      Pulses: Normal pulses.      Heart sounds: Normal heart sounds. No murmur heard.  No friction rub.   Pulmonary:      Effort: Pulmonary effort is normal. No respiratory distress.      Breath sounds: Normal breath sounds. No wheezing.   Abdominal:      General: Abdomen is flat. Bowel sounds are normal. There is no distension.      Tenderness: There is no abdominal tenderness.   Musculoskeletal:         General: Tenderness present. Normal range of motion.      Cervical back: Normal range of motion and neck supple. No rigidity or tenderness.      Right lower leg: No edema.      Left lower leg: No edema.      Comments: Limited range of motion of flexion extension of the lumbar spine.  Negative straight leg raising.  Neuro is intact.   Skin:     General: Skin is warm.      Capillary Refill: Capillary refill takes less than 2 seconds.      Findings: No rash.   Neurological:      General: No focal deficit present.      Mental Status: He is alert and oriented to person, place, and time. Mental status is at baseline.      Cranial Nerves: No cranial nerve deficit.      Sensory: No sensory deficit.      Motor: No weakness.      Coordination: Coordination normal.      Gait: Gait normal.      Deep Tendon Reflexes: Reflexes normal.   Psychiatric:         Mood and Affect: Mood normal.         Behavior: Behavior normal.         Thought Content: Thought content normal.         Judgment: Judgment normal.     S          Lab Results   Component Value Date    WBC 12.10 (H) 04/26/2021    HGB 15.0 04/26/2021    HCT 44.2 04/26/2021    MCV 97 (H) 04/26/2021     04/26/2021          Sodium   Date Value Ref Range Status   08/02/2021 141 136 - 145 mmol/L Final     Potassium   Date Value Ref Range Status   08/02/2021 4.0 3.5 - 5.1 mmol/L Final     Chloride   Date Value Ref Range Status   08/02/2021 106 98 - 107 mmol/L Final     CO2   Date Value Ref Range Status   08/02/2021 29 21 - 32 mmol/L Final     Glucose   Date  Value Ref Range Status   08/02/2021 87 74 - 106 mg/dL Final     BUN   Date Value Ref Range Status   08/02/2021 10 7 - 18 mg/dL Final     Creatinine   Date Value Ref Range Status   08/02/2021 0.73 0.70 - 1.30 mg/dL Final     Calcium   Date Value Ref Range Status   08/02/2021 8.7 8.5 - 10.1 mg/dL Final     Total Protein   Date Value Ref Range Status   04/26/2021 7.2 6.4 - 8.2 g/dL      Albumin   Date Value Ref Range Status   04/26/2021 3.9 3.4 - 5.0 g/dL      Bilirubin, Total   Date Value Ref Range Status   04/26/2021 0.3 0.2 - 1.0 mg/dL      Alk Phos   Date Value Ref Range Status   04/26/2021 102 50 - 136 U/L      AST   Date Value Ref Range Status   04/26/2021 20 15 - 37 U/L      ALT   Date Value Ref Range Status   04/26/2021 43 12 - 78 U/L      Anion Gap   Date Value Ref Range Status   08/02/2021 10 7 - 16 mmol/L Final     eGFR    Date Value Ref Range Status   04/26/2021 91       eGFR   Date Value Ref Range Status   08/02/2021 120 >=60 mL/min/1.73m² Final      No image results found.     Procedures   Assessment and Plan (including Health Maintenance)      Problem List  Smart Sets  Document Outside HM   :    Plan:         Health Maintenance Due   Topic Date Due    Hepatitis C Screening  Never done    Diabetes Urine Screening  Never done    COVID-19 Vaccine (1) Never done    Foot Exam  Never done    HIV Screening  Never done    TETANUS VACCINE  Never done    Low Dose Statin  Never done    Colorectal Cancer Screening  Never done    Eye Exam  01/26/2017    Shingles Vaccine (1 of 2) Never done    Influenza Vaccine (1) Never done    Hemoglobin A1c  02/02/2022       Problem List Items Addressed This Visit        Neuro    Lumbar disc disease - Primary    Current Assessment & Plan     Her leaving treated by pain management.  No change in medication.  Of note patient is seeing a neurologist in [place]            Cardiac/Vascular    Essential hypertension    Current Assessment & Plan      Pressure is controlled however would like to see the diastolic pressure were in the 70 range.  Discussed with him sodium intake in diet as well as exercises.  Also discussed weight loss with him in that if he loses are present of body weight he should definitely get his blood pressure under better control.  Follow-up every 3 months.  No change in medicines.         Relevant Medications    amLODIPine (NORVASC) 5 MG tablet    Other Relevant Orders    CBC Auto Differential    Hyperlipidemia    Current Assessment & Plan     History of hyperlipidemia so will check his lipid levels today.  Also checked hemoglobin A1c on him.  Follow-up should be every 6 months.         Relevant Orders    Lipid Panel       Endocrine    Type 2 diabetes mellitus with neurologic complication, without long-term current use of insulin    Current Assessment & Plan     Glucose has been in the 100-150 range at home.  Patient checks his blood sugar several times per week.  Will check hemoglobin A1c on him today and discussed with him changes after we get that back         Relevant Medications    glimepiride (AMARYL) 2 MG tablet    metFORMIN (GLUCOPHAGE-XR) 500 MG ER 24hr tablet    Other Relevant Orders    Comprehensive Metabolic Panel    Lipid Panel    Microalbumin/Creatinine Ratio, Urine    Hemoglobin A1C          Health Maintenance Topics with due status: Not Due       Topic Last Completion Date    Pneumococcal Vaccines (Age 0-64) 08/28/2015    Lipid Panel 08/02/2021       Future Appointments   Date Time Provider Department Center   9/1/2022  8:00 AM AW[facility] NURSE [facility]        Follow up in about 3 months (around 5/16/2022).     Signature:  DO [facility]  [address]  [place]  [address]    Date of encounter: 2/16/22

## 2022-02-16 NOTE — ASSESSMENT & PLAN NOTE
Glucose has been in the 100-150 range at home.  Patient checks his blood sugar several times per week.  Will check hemoglobin A1c on him today and discussed with him changes after we get that back

## 2022-02-16 NOTE — ASSESSMENT & PLAN NOTE
History of hyperlipidemia so will check his lipid levels today.  Also checked hemoglobin A1c on him.  Follow-up should be every 6 months.

## 2022-02-16 NOTE — ASSESSMENT & PLAN NOTE
Pressure is controlled however would like to see the diastolic pressure were in the 70 range.  Discussed with him sodium intake in diet as well as exercises.  Also discussed weight loss with him in that if he loses are present of body weight he should definitely get his blood pressure under better control.  Follow-up every 3 months.  No change in medicines.

## 2022-02-16 NOTE — ASSESSMENT & PLAN NOTE
Her leaving treated by pain management.  No change in medication.  Of note patient is seeing a neurologist in [place]

## 2022-08-17 DIAGNOSIS — E11.43 TYPE 2 DIABETES MELLITUS WITH DIABETIC AUTONOMIC NEUROPATHY, WITHOUT LONG-TERM CURRENT USE OF INSULIN: ICD-10-CM

## 2022-08-17 DIAGNOSIS — I10 ESSENTIAL HYPERTENSION: ICD-10-CM

## 2022-08-18 RX ORDER — METFORMIN HYDROCHLORIDE 500 MG/1
TABLET, EXTENDED RELEASE ORAL
Qty: 360 TABLET | Refills: 1 | Status: SHIPPED | OUTPATIENT
Start: 2022-08-18 | End: 2023-03-17 | Stop reason: SDUPTHER

## 2022-08-18 RX ORDER — AMLODIPINE BESYLATE 5 MG/1
5 TABLET ORAL DAILY
Qty: 90 TABLET | Refills: 1 | Status: SHIPPED | OUTPATIENT
Start: 2022-08-18 | End: 2023-03-17 | Stop reason: SDUPTHER

## 2022-12-19 ENCOUNTER — OFFICE VISIT (OUTPATIENT)
Dept: FAMILY MEDICINE | Facility: CLINIC | Age: 52
End: 2022-12-19
Payer: MEDICARE

## 2022-12-19 VITALS
TEMPERATURE: 98 F | OXYGEN SATURATION: 97 % | DIASTOLIC BLOOD PRESSURE: 90 MMHG | SYSTOLIC BLOOD PRESSURE: 144 MMHG | BODY MASS INDEX: 29.69 KG/M2 | WEIGHT: 207.38 LBS | RESPIRATION RATE: 20 BRPM | HEART RATE: 77 BPM | HEIGHT: 70 IN

## 2022-12-19 DIAGNOSIS — R52 BODY ACHES: ICD-10-CM

## 2022-12-19 DIAGNOSIS — R50.9 FEVER, UNSPECIFIED FEVER CAUSE: ICD-10-CM

## 2022-12-19 DIAGNOSIS — U07.1 COVID: Primary | ICD-10-CM

## 2022-12-19 LAB
CTP QC/QA: YES
FLUAV AG NPH QL: NEGATIVE
FLUBV AG NPH QL: NEGATIVE
SARS-COV-2 AG RESP QL IA.RAPID: POSITIVE

## 2022-12-19 PROCEDURE — 3044F PR MOST RECENT HEMOGLOBIN A1C LEVEL <7.0%: ICD-10-PCS | Mod: ,,, | Performed by: NURSE PRACTITIONER

## 2022-12-19 PROCEDURE — 1159F MED LIST DOCD IN RCRD: CPT | Mod: ,,, | Performed by: NURSE PRACTITIONER

## 2022-12-19 PROCEDURE — 3060F PR POS MICROALBUMINURIA RESULT DOCUMENTED/REVIEW: ICD-10-PCS | Mod: ,,, | Performed by: NURSE PRACTITIONER

## 2022-12-19 PROCEDURE — 3080F PR MOST RECENT DIASTOLIC BLOOD PRESSURE >= 90 MM HG: ICD-10-PCS | Mod: ,,, | Performed by: NURSE PRACTITIONER

## 2022-12-19 PROCEDURE — 3008F BODY MASS INDEX DOCD: CPT | Mod: ,,, | Performed by: NURSE PRACTITIONER

## 2022-12-19 PROCEDURE — 3066F NEPHROPATHY DOC TX: CPT | Mod: ,,, | Performed by: NURSE PRACTITIONER

## 2022-12-19 PROCEDURE — 3080F DIAST BP >= 90 MM HG: CPT | Mod: ,,, | Performed by: NURSE PRACTITIONER

## 2022-12-19 PROCEDURE — 3066F PR DOCUMENTATION OF TREATMENT FOR NEPHROPATHY: ICD-10-PCS | Mod: ,,, | Performed by: NURSE PRACTITIONER

## 2022-12-19 PROCEDURE — 1160F PR REVIEW ALL MEDS BY PRESCRIBER/CLIN PHARMACIST DOCUMENTED: ICD-10-PCS | Mod: ,,, | Performed by: NURSE PRACTITIONER

## 2022-12-19 PROCEDURE — 3008F PR BODY MASS INDEX (BMI) DOCUMENTED: ICD-10-PCS | Mod: ,,, | Performed by: NURSE PRACTITIONER

## 2022-12-19 PROCEDURE — 1160F RVW MEDS BY RX/DR IN RCRD: CPT | Mod: ,,, | Performed by: NURSE PRACTITIONER

## 2022-12-19 PROCEDURE — 3044F HG A1C LEVEL LT 7.0%: CPT | Mod: ,,, | Performed by: NURSE PRACTITIONER

## 2022-12-19 PROCEDURE — 3077F PR MOST RECENT SYSTOLIC BLOOD PRESSURE >= 140 MM HG: ICD-10-PCS | Mod: ,,, | Performed by: NURSE PRACTITIONER

## 2022-12-19 PROCEDURE — 3077F SYST BP >= 140 MM HG: CPT | Mod: ,,, | Performed by: NURSE PRACTITIONER

## 2022-12-19 PROCEDURE — 87428 SARSCOV & INF VIR A&B AG IA: CPT | Mod: RHCUB | Performed by: NURSE PRACTITIONER

## 2022-12-19 PROCEDURE — 99213 PR OFFICE/OUTPT VISIT, EST, LEVL III, 20-29 MIN: ICD-10-PCS | Mod: ,,, | Performed by: NURSE PRACTITIONER

## 2022-12-19 PROCEDURE — 99213 OFFICE O/P EST LOW 20 MIN: CPT | Mod: ,,, | Performed by: NURSE PRACTITIONER

## 2022-12-19 PROCEDURE — 1159F PR MEDICATION LIST DOCUMENTED IN MEDICAL RECORD: ICD-10-PCS | Mod: ,,, | Performed by: NURSE PRACTITIONER

## 2022-12-19 PROCEDURE — 3060F POS MICROALBUMINURIA REV: CPT | Mod: ,,, | Performed by: NURSE PRACTITIONER

## 2022-12-19 RX ORDER — ALBUTEROL SULFATE 90 UG/1
1-2 AEROSOL, METERED RESPIRATORY (INHALATION)
Qty: 18 G | Refills: 0 | Status: SHIPPED | OUTPATIENT
Start: 2022-12-19 | End: 2023-05-05 | Stop reason: ALTCHOICE

## 2022-12-19 RX ORDER — AZITHROMYCIN 250 MG/1
TABLET, FILM COATED ORAL
Qty: 6 TABLET | Refills: 0 | Status: SHIPPED | OUTPATIENT
Start: 2022-12-19 | End: 2022-12-24

## 2022-12-19 NOTE — ASSESSMENT & PLAN NOTE
Push fluids  Wash hands often   Start vitamins d, c and zinc  Monitor for worsening symptoms and treat as appropriate

## 2022-12-19 NOTE — PROGRESS NOTES
HAYLEY Clarke   [place] [address]  [place]  [address]      PATIENT NAME: Bruno Levy  : 1970  DATE: 22  MRN: [id number]      Billing Provider: HAYLEY Clarke  Level of Service:   Patient PCP Information       Provider PCP Type    Cam Sanon DO General            Reason for Visit / Chief Complaint: Headache (X 5 days), Nasal Congestion (X 5 days), Chest Congestion (X 5 days), Sore Throat (X 5 days), Fatigue (X 5 days), Fever (Pt states he ran a fever Wednesday night but has not had a fever since then.), and Generalized Body Aches (X 5 days)       Update PCP  Update Chief Complaint         History of Present Illness / Problem Focused Workflow     Bruno Levy presents to the clinic with Headache (X 5 days), Nasal Congestion (X 5 days), Chest Congestion (X 5 days), Sore Throat (X 5 days), Fatigue (X 5 days), Fever (Pt states he ran a fever Wednesday night but has not had a fever since then.), and Generalized Body Aches (X 5 days)     Patient presents to clinic with c/o cough, congestion, drainage, fatigue, wheezing x 2-3 days.       Review of Systems     @Review of Systems   Constitutional:  Negative for fatigue and fever.   HENT:  Positive for sinus pressure/congestion. Negative for nasal congestion, ear pain, postnasal drip and rhinorrhea.    Eyes:  Negative for discharge and itching.   Respiratory:  Positive for cough and chest tightness. Negative for shortness of breath and wheezing.    Cardiovascular:  Negative for chest pain and palpitations.   Gastrointestinal:  Negative for abdominal pain, blood in stool, change in bowel habit and change in bowel habit.   Genitourinary:  Negative for dysuria.   Musculoskeletal:  Negative for joint swelling.   Neurological:  Negative for dizziness and headaches.     Medical / Social / Family History     Past Medical History:   Diagnosis Date    Depression     Diabetes mellitus, type 2     History of methicillin resistant  staphylococcus aureus (MRSA)     Hyperlipidemia     Hypertension     Neuropathy     Osteomyelitis        Past Surgical History:   Procedure Laterality Date    FRACTURE SURGERY  1976    arm    LUMBAR FUSION      NASAL SEPTUM SURGERY      SPINAL CORD STIMULATOR IMPLANT      SPINAL CORD STIMULATOR REMOVAL      TOE AMPUTATION      rt foot great to and 5th toe;lt foot great,4th and 5th toes.       Social History    reports that he quit smoking about a year ago. His smoking use included cigarettes. He started smoking about 35 years ago. He smoked an average of 1 pack per day. He has been exposed to tobacco smoke. He has never used smokeless tobacco. He reports that he does not currently use alcohol. He reports that he does not use drugs.    Family History  's family history includes Bladder Cancer in his father; COPD in his mother; Hypertension in his brother; Lung cancer in his father.    Medications and Allergies     Medications  Outpatient Medications Marked as Taking for the 12/19/22 encounter (Office Visit) with HAYLEY Clarke   Medication Sig Dispense Refill    amLODIPine (NORVASC) 5 MG tablet TAKE 1 TABLET (5 MG TOTAL) BY MOUTH ONCE DAILY. 90 tablet 1    baclofen (LIORESAL) 20 MG tablet Take 20 mg by mouth every 6 (six) hours as needed.      gabapentin (NEURONTIN) 600 MG tablet 1.5 tablets every 6 hours      HYDROcodone-acetaminophen (NORCO)  mg per tablet hydrocodone 10 mg-acetaminophen 325 mg tablet   TAKE 1 TABLET BY MOUTH EVERY 8 HOURS AS NEEDED FOR PAIN MAY CAUSE DROWSINESS      metFORMIN (GLUCOPHAGE-XR) 500 MG ER 24hr tablet TAKE 2 TABLETS TWICE DAILY WITH MEALS 360 tablet 1       Allergies  Review of patient's allergies indicates:   Allergen Reactions    Levofloxacin (bulk) Other (See Comments)     Worsened muscle spasms       Physical Examination     Vitals:    12/19/22 0913   BP: (!) 144/90   Pulse: 77   Resp: 20   Temp: 97.8 °F (36.6 °C)     Physical Exam  Constitutional:       General: He  is not in acute distress.     Appearance: Normal appearance.   Cardiovascular:      Rate and Rhythm: Normal rate and regular rhythm.   Pulmonary:      Effort: Pulmonary effort is normal. No respiratory distress.      Breath sounds: Normal breath sounds. No wheezing, rhonchi or rales.   Skin:     General: Skin is warm and dry.   Neurological:      Mental Status: He is alert.   Psychiatric:         Mood and Affect: Mood normal.         Behavior: Behavior normal.             Lab Results   Component Value Date    WBC 10.51 02/16/2022    HGB 14.7 02/16/2022    HCT 43.5 02/16/2022    MCV 95.2 02/16/2022     02/16/2022          Sodium   Date Value Ref Range Status   02/16/2022 140 136 - 145 mmol/L Final     Potassium   Date Value Ref Range Status   02/16/2022 4.5 3.5 - 5.1 mmol/L Final     Chloride   Date Value Ref Range Status   02/16/2022 105 98 - 107 mmol/L Final     CO2   Date Value Ref Range Status   02/16/2022 31 21 - 32 mmol/L Final     Glucose   Date Value Ref Range Status   02/16/2022 115 (H) 74 - 106 mg/dL Final     BUN   Date Value Ref Range Status   02/16/2022 12 7 - 18 mg/dL Final     Creatinine   Date Value Ref Range Status   02/16/2022 0.86 0.70 - 1.30 mg/dL Final     Calcium   Date Value Ref Range Status   02/16/2022 9.2 8.5 - 10.1 mg/dL Final     Total Protein   Date Value Ref Range Status   02/16/2022 7.4 6.4 - 8.2 g/dL Final     Albumin   Date Value Ref Range Status   02/16/2022 4.0 3.5 - 5.0 g/dL Final     Bilirubin, Total   Date Value Ref Range Status   02/16/2022 0.5 0.0 - 1.2 mg/dL Final     Alk Phos   Date Value Ref Range Status   02/16/2022 77 45 - 115 U/L Final     AST   Date Value Ref Range Status   02/16/2022 20 15 - 37 U/L Final     ALT   Date Value Ref Range Status   02/16/2022 36 16 - 61 U/L Final     Anion Gap   Date Value Ref Range Status   02/16/2022 9 7 - 16 mmol/L Final     eGFR    Date Value Ref Range Status   04/26/2021 91       eGFR   Date Value Ref Range Status    02/16/2022 100 >=60 mL/min/1.73m² Final      No image results found.     Procedures   Assessment and Plan (including Health Maintenance)      Problem List  Smart Sets  Document Outside HM   :    Plan:           Problem List Items Addressed This Visit          ID    COVID    Current Assessment & Plan     Push fluids  Wash hands often   Start vitamins d, c and zinc  Monitor for worsening symptoms and treat as appropriate          Other Visit Diagnoses       Fever, unspecified fever cause    -  Primary    Relevant Orders    POCT SARS-COV2 (COVID) with Flu Antigen (Completed)    Body aches        Relevant Orders    POCT SARS-COV2 (COVID) with Flu Antigen (Completed)            Health Maintenance Topics with due status: Not Due       Topic Last Completion Date    Lipid Panel 02/16/2022       No future appointments.     Health Maintenance Due   Topic Date Due    PROSTATE-SPECIFIC ANTIGEN  Never done    Foot Exam  Never done    TETANUS VACCINE  Never done    Low Dose Statin  Never done    Colorectal Cancer Screening  Never done    Pneumococcal Vaccines (Age 0-64) (2 - PCV) 08/28/2016    Eye Exam  01/26/2017    Shingles Vaccine (1 of 2) Never done    Hemoglobin A1c  08/16/2022    Diabetes Urine Screening  02/16/2023        Follow up if symptoms worsen or fail to improve.     Signature:  HAYLEY Clarke  [facility]  [address] [place] [address], [place]  [address]    Date of encounter: 12/19/22

## 2023-01-09 DIAGNOSIS — Z71.89 COMPLEX CARE COORDINATION: ICD-10-CM

## 2023-03-01 ENCOUNTER — PATIENT OUTREACH (OUTPATIENT)
Dept: ADMINISTRATIVE | Facility: HOSPITAL | Age: 53
End: 2023-03-01

## 2023-03-01 ENCOUNTER — PATIENT MESSAGE (OUTPATIENT)
Dept: ADMINISTRATIVE | Facility: HOSPITAL | Age: 53
End: 2023-03-01

## 2023-03-01 NOTE — PROGRESS NOTES
POPULATION HEALTH-Non-compliant report chart audits/DIABETES. Chart review completed for HM test overdue (mammograms, Colonoscopies, pap smears, DM labs, and/or EYE EXAMs)      RE:  The patient is due for diabetic lab testing and office visit.     Care Everywhere and media, updates requested and reviewed.      [facility] and [facility] reviewed    Attempted to call patient.  No answer, could not leave voice mail.    Message sent to patient's portal.

## 2023-03-17 ENCOUNTER — OFFICE VISIT (OUTPATIENT)
Dept: FAMILY MEDICINE | Facility: CLINIC | Age: 53
End: 2023-03-17
Payer: MEDICARE

## 2023-03-17 VITALS
TEMPERATURE: 98 F | RESPIRATION RATE: 19 BRPM | OXYGEN SATURATION: 98 % | SYSTOLIC BLOOD PRESSURE: 122 MMHG | DIASTOLIC BLOOD PRESSURE: 88 MMHG | WEIGHT: 210 LBS | HEART RATE: 72 BPM | BODY MASS INDEX: 30.06 KG/M2 | HEIGHT: 70 IN

## 2023-03-17 DIAGNOSIS — E53.8 VITAMIN B 12 DEFICIENCY: ICD-10-CM

## 2023-03-17 DIAGNOSIS — E78.2 MIXED HYPERLIPIDEMIA: ICD-10-CM

## 2023-03-17 DIAGNOSIS — G63 VITAMIN B12 DEFICIENCY NEUROPATHY: ICD-10-CM

## 2023-03-17 DIAGNOSIS — L97.521 ULCER OF LEFT FOOT, LIMITED TO BREAKDOWN OF SKIN: ICD-10-CM

## 2023-03-17 DIAGNOSIS — F32.A DEPRESSION, UNSPECIFIED DEPRESSION TYPE: ICD-10-CM

## 2023-03-17 DIAGNOSIS — I10 ESSENTIAL HYPERTENSION: Primary | ICD-10-CM

## 2023-03-17 DIAGNOSIS — G62.9 NEUROPATHY: ICD-10-CM

## 2023-03-17 DIAGNOSIS — E53.8 VITAMIN B12 DEFICIENCY NEUROPATHY: ICD-10-CM

## 2023-03-17 DIAGNOSIS — E11.43 TYPE 2 DIABETES MELLITUS WITH DIABETIC AUTONOMIC NEUROPATHY, WITHOUT LONG-TERM CURRENT USE OF INSULIN: ICD-10-CM

## 2023-03-17 LAB
ALBUMIN SERPL BCP-MCNC: 4.2 G/DL (ref 3.5–5)
ALBUMIN/GLOB SERPL: 1.3 {RATIO}
ALP SERPL-CCNC: 76 U/L (ref 45–115)
ALT SERPL W P-5'-P-CCNC: 68 U/L (ref 16–61)
ANION GAP SERPL CALCULATED.3IONS-SCNC: 8 MMOL/L (ref 7–16)
AST SERPL W P-5'-P-CCNC: 36 U/L (ref 15–37)
BASOPHILS # BLD AUTO: 0.11 K/UL (ref 0–0.2)
BASOPHILS NFR BLD AUTO: 1.3 % (ref 0–1)
BILIRUB SERPL-MCNC: 0.6 MG/DL (ref ?–1.2)
BUN SERPL-MCNC: 15 MG/DL (ref 7–18)
BUN/CREAT SERPL: 17 (ref 6–20)
CALCIUM SERPL-MCNC: 9.3 MG/DL (ref 8.5–10.1)
CHLORIDE SERPL-SCNC: 101 MMOL/L (ref 98–107)
CHOLEST SERPL-MCNC: 183 MG/DL (ref 0–200)
CHOLEST/HDLC SERPL: 5.1 {RATIO}
CO2 SERPL-SCNC: 32 MMOL/L (ref 21–32)
CREAT SERPL-MCNC: 0.86 MG/DL (ref 0.7–1.3)
CREAT UR-MCNC: 90 MG/DL (ref 39–259)
DIFFERENTIAL METHOD BLD: ABNORMAL
EGFR (NO RACE VARIABLE) (RUSH/TITUS): 104 ML/MIN/1.73M²
EOSINOPHIL # BLD AUTO: 0.33 K/UL (ref 0–0.5)
EOSINOPHIL NFR BLD AUTO: 3.9 % (ref 1–4)
ERYTHROCYTE [DISTWIDTH] IN BLOOD BY AUTOMATED COUNT: 13.2 % (ref 11.5–14.5)
EST. AVERAGE GLUCOSE BLD GHB EST-MCNC: 127 MG/DL
FOLATE SERPL-MCNC: 20 NG/ML (ref 3.1–17.5)
GLOBULIN SER-MCNC: 3.3 G/DL (ref 2–4)
GLUCOSE SERPL-MCNC: 142 MG/DL (ref 74–106)
HBA1C MFR BLD HPLC: 6.4 % (ref 4.5–6.6)
HCT VFR BLD AUTO: 43.6 % (ref 40–54)
HDLC SERPL-MCNC: 36 MG/DL (ref 40–60)
HGB BLD-MCNC: 14.5 G/DL (ref 13.5–18)
IMM GRANULOCYTES # BLD AUTO: 0.03 K/UL (ref 0–0.04)
IMM GRANULOCYTES NFR BLD: 0.4 % (ref 0–0.4)
LDLC SERPL CALC-MCNC: 100 MG/DL
LDLC/HDLC SERPL: 2.8 {RATIO}
LYMPHOCYTES # BLD AUTO: 2.91 K/UL (ref 1–4.8)
LYMPHOCYTES NFR BLD AUTO: 34.3 % (ref 27–41)
MCH RBC QN AUTO: 31.5 PG (ref 27–31)
MCHC RBC AUTO-ENTMCNC: 33.3 G/DL (ref 32–36)
MCV RBC AUTO: 94.6 FL (ref 80–96)
MICROALBUMIN UR-MCNC: 6.9 MG/DL (ref 0–2.8)
MICROALBUMIN/CREAT RATIO PNL UR: 76.7 MG/G (ref 0–30)
MONOCYTES # BLD AUTO: 0.81 K/UL (ref 0–0.8)
MONOCYTES NFR BLD AUTO: 9.5 % (ref 2–6)
MPC BLD CALC-MCNC: 11.5 FL (ref 9.4–12.4)
NEUTROPHILS # BLD AUTO: 4.3 K/UL (ref 1.8–7.7)
NEUTROPHILS NFR BLD AUTO: 50.6 % (ref 53–65)
NONHDLC SERPL-MCNC: 147 MG/DL
NRBC # BLD AUTO: 0 X10E3/UL
NRBC, AUTO (.00): 0 %
PLATELET # BLD AUTO: 241 K/UL (ref 150–400)
POTASSIUM SERPL-SCNC: 4 MMOL/L (ref 3.5–5.1)
PROT SERPL-MCNC: 7.5 G/DL (ref 6.4–8.2)
RBC # BLD AUTO: 4.61 M/UL (ref 4.6–6.2)
SODIUM SERPL-SCNC: 137 MMOL/L (ref 136–145)
T4 SERPL-MCNC: 10.1 ΜG/DL (ref 4.5–12.1)
TRIGL SERPL-MCNC: 235 MG/DL (ref 35–150)
TSH SERPL DL<=0.005 MIU/L-ACNC: 1.6 UIU/ML (ref 0.36–3.74)
VIT B12 SERPL-MCNC: 1350 PG/ML (ref 193–986)
VLDLC SERPL-MCNC: 47 MG/DL
WBC # BLD AUTO: 8.49 K/UL (ref 4.5–11)

## 2023-03-17 PROCEDURE — 82607 VITAMIN B-12: CPT | Mod: ,,, | Performed by: CLINICAL MEDICAL LABORATORY

## 2023-03-17 PROCEDURE — 1159F PR MEDICATION LIST DOCUMENTED IN MEDICAL RECORD: ICD-10-PCS | Mod: ,,, | Performed by: FAMILY MEDICINE

## 2023-03-17 PROCEDURE — 1159F MED LIST DOCD IN RCRD: CPT | Mod: ,,, | Performed by: FAMILY MEDICINE

## 2023-03-17 PROCEDURE — 83036 HEMOGLOBIN A1C: ICD-10-PCS | Mod: ,,, | Performed by: CLINICAL MEDICAL LABORATORY

## 2023-03-17 PROCEDURE — 3008F BODY MASS INDEX DOCD: CPT | Mod: ,,, | Performed by: FAMILY MEDICINE

## 2023-03-17 PROCEDURE — 80061 LIPID PANEL: CPT | Mod: ,,, | Performed by: CLINICAL MEDICAL LABORATORY

## 2023-03-17 PROCEDURE — 3080F DIAST BP >= 90 MM HG: CPT | Mod: ,,, | Performed by: FAMILY MEDICINE

## 2023-03-17 PROCEDURE — 3077F PR MOST RECENT SYSTOLIC BLOOD PRESSURE >= 140 MM HG: ICD-10-PCS | Mod: ,,, | Performed by: FAMILY MEDICINE

## 2023-03-17 PROCEDURE — 80050 GENERAL HEALTH PANEL: CPT | Mod: ,,, | Performed by: CLINICAL MEDICAL LABORATORY

## 2023-03-17 PROCEDURE — 82570 MICROALBUMIN / CREATININE RATIO URINE: ICD-10-PCS | Mod: ,,, | Performed by: CLINICAL MEDICAL LABORATORY

## 2023-03-17 PROCEDURE — 3077F SYST BP >= 140 MM HG: CPT | Mod: ,,, | Performed by: FAMILY MEDICINE

## 2023-03-17 PROCEDURE — 84436 T4: ICD-10-PCS | Mod: ,,, | Performed by: CLINICAL MEDICAL LABORATORY

## 2023-03-17 PROCEDURE — 82043 MICROALBUMIN / CREATININE RATIO URINE: ICD-10-PCS | Mod: ,,, | Performed by: CLINICAL MEDICAL LABORATORY

## 2023-03-17 PROCEDURE — 83036 HEMOGLOBIN GLYCOSYLATED A1C: CPT | Mod: ,,, | Performed by: CLINICAL MEDICAL LABORATORY

## 2023-03-17 PROCEDURE — 80050 PR  GENERAL HEALTH PANEL: ICD-10-PCS | Mod: ,,, | Performed by: CLINICAL MEDICAL LABORATORY

## 2023-03-17 PROCEDURE — 99215 OFFICE O/P EST HI 40 MIN: CPT | Mod: ,,, | Performed by: FAMILY MEDICINE

## 2023-03-17 PROCEDURE — 82746 ASSAY OF FOLIC ACID SERUM: CPT | Mod: ,,, | Performed by: CLINICAL MEDICAL LABORATORY

## 2023-03-17 PROCEDURE — 80061 LIPID PANEL: ICD-10-PCS | Mod: ,,, | Performed by: CLINICAL MEDICAL LABORATORY

## 2023-03-17 PROCEDURE — 82570 ASSAY OF URINE CREATININE: CPT | Mod: ,,, | Performed by: CLINICAL MEDICAL LABORATORY

## 2023-03-17 PROCEDURE — 3008F PR BODY MASS INDEX (BMI) DOCUMENTED: ICD-10-PCS | Mod: ,,, | Performed by: FAMILY MEDICINE

## 2023-03-17 PROCEDURE — 84436 ASSAY OF TOTAL THYROXINE: CPT | Mod: ,,, | Performed by: CLINICAL MEDICAL LABORATORY

## 2023-03-17 PROCEDURE — 82043 UR ALBUMIN QUANTITATIVE: CPT | Mod: ,,, | Performed by: CLINICAL MEDICAL LABORATORY

## 2023-03-17 PROCEDURE — 82607 VITAMIN B12/FOLATE, SERUM PANEL: ICD-10-PCS | Mod: ,,, | Performed by: CLINICAL MEDICAL LABORATORY

## 2023-03-17 PROCEDURE — 3080F PR MOST RECENT DIASTOLIC BLOOD PRESSURE >= 90 MM HG: ICD-10-PCS | Mod: ,,, | Performed by: FAMILY MEDICINE

## 2023-03-17 PROCEDURE — 82746 VITAMIN B12/FOLATE, SERUM PANEL: ICD-10-PCS | Mod: ,,, | Performed by: CLINICAL MEDICAL LABORATORY

## 2023-03-17 PROCEDURE — 99215 PR OFFICE/OUTPT VISIT, EST, LEVL V, 40-54 MIN: ICD-10-PCS | Mod: ,,, | Performed by: FAMILY MEDICINE

## 2023-03-17 RX ORDER — OXYCODONE AND ACETAMINOPHEN 7.5; 325 MG/1; MG/1
1 TABLET ORAL EVERY 8 HOURS PRN
COMMUNITY
Start: 2023-03-10

## 2023-03-17 RX ORDER — METFORMIN HYDROCHLORIDE 500 MG/1
TABLET, EXTENDED RELEASE ORAL
Qty: 360 TABLET | Refills: 1 | Status: SHIPPED | OUTPATIENT
Start: 2023-03-17 | End: 2023-07-13 | Stop reason: SDUPTHER

## 2023-03-17 RX ORDER — AMLODIPINE BESYLATE 5 MG/1
10 TABLET ORAL DAILY
Qty: 90 TABLET | Refills: 1 | Status: SHIPPED | OUTPATIENT
Start: 2023-03-17 | End: 2023-07-12 | Stop reason: SDUPTHER

## 2023-03-17 NOTE — ASSESSMENT & PLAN NOTE
Ulcer of his left foot a proximally 3 mm over the distal stump of his large toe.  No tenderness or drainage noted.  Remove packing.  Proximally 2 mm D. redressed wound.  Continue wound care

## 2023-03-17 NOTE — PROGRESS NOTES
Dressed wound on left foot with telfa non-adherent drsg with paper tape and wrapped with coban wrap.

## 2023-03-17 NOTE — ASSESSMENT & PLAN NOTE
Will check a B12 level today.  Neuropathy is still apparent with no sensation in his lower extremities particular when low his knees.  Likely this is also related to his diabetes.

## 2023-03-17 NOTE — ASSESSMENT & PLAN NOTE
Diabetes is been well controlled however his A1c is unknown for year.  Will check an A1c lipid panel urine albumin creatinine ratio on him today.  CBC and CMP were also done.  Will check a TSH and T4 also.  Follow-up every 3 months.  Continue his metformin to twice daily so 1000 mg twice daily

## 2023-03-17 NOTE — PROGRESS NOTES
Her lab get   Cam Sanon DO   [facility]  [address]  [place]  [address]      PATIENT NAME: Bruno Levy  : 1970  DATE: 3/17/23  MRN: [id number]      Billing Provider: Cam Sanon DO  Level of Service: [place] OFFICE/OUTPT VISIT, EST, LEVL V, 40-54 MIN  Patient PCP Information       Provider PCP Type    Cam Sanon DO General            Reason for Visit / Chief Complaint: Hypertension (Pt c/o his BP being elevated at his recent visits with wound care at [facility]), Diabetes (Checkup on diabetes), and Hyperlipidemia (Checkup on lipid disorder. )       Update PCP  Update Chief Complaint         History of Present Illness / Problem Focused Workflow     Bruno Levy presents to the clinic with Hypertension (Pt c/o his BP being elevated at his recent visits with wound care at [facility]), Diabetes (Checkup on diabetes), and Hyperlipidemia (Checkup on lipid disorder. )     Patient is in for follow-up on his hypertension which is been elevated recently.  He goes to pain management but has not seen us in over a year.  He states his blood pressures have been out and he is taking his amlodipine once daily.  He denies any chest pain shortness of breath palpitations PND or orthopnea.  Also has a history of hyperlipidemia but does not take anything for that.  Patient also has history of diabetes and he states is been well controlled in his last A1c was less than 7.  He denies any weakness but does have numbness in his lower extremities bilaterally.  Does have ulcer on his left foot at the base of the 1st toe where he has had that amputated.  He has 2 toes on his left and 3 toes on his right.    Review of Systems     Review of Systems   Constitutional:  Negative for activity change, appetite change, chills, fatigue and fever.   HENT:  Negative for nasal congestion, ear discharge, ear pain, mouth dryness, mouth sores, postnasal drip, sinus pressure/congestion, sore throat and voice change.     Eyes:  Negative for pain, discharge, redness, itching and visual disturbance.   Respiratory:  Negative for apnea, cough, chest tightness, shortness of breath and wheezing.    Cardiovascular:  Negative for chest pain, palpitations and leg swelling.   Gastrointestinal:  Negative for abdominal distention, abdominal pain, anal bleeding, blood in stool, change in bowel habit, constipation, diarrhea, nausea, vomiting, reflux and change in bowel habit.   Endocrine: Negative for cold intolerance, heat intolerance, polydipsia, polyphagia and polyuria.   Genitourinary:  Negative for difficulty urinating, enuresis, erectile dysfunction, frequency, genital sores, hematuria and urgency.   Musculoskeletal:  Negative for arthralgias, back pain, gait problem, leg pain, myalgias and neck pain.   Integumentary:  Positive for wound. Negative for rash, mole/lesion, breast mass and breast discharge.   Allergic/Immunologic: Negative for environmental allergies and food allergies.   Neurological:  Positive for numbness. Negative for dizziness, vertigo, tremors, seizures, syncope, facial asymmetry, speech difficulty, weakness, light-headedness, headaches, coordination difficulties, memory loss and coordination difficulties.   Hematological:  Negative for adenopathy. Does not bruise/bleed easily.   Psychiatric/Behavioral:  Negative for agitation, behavioral problems, confusion, decreased concentration, dysphoric mood, hallucinations, self-injury, sleep disturbance and suicidal ideas. The patient is not nervous/anxious and is not hyperactive.    Breast: Negative for mass    Medical / Social / Family History     Past Medical History:   Diagnosis Date    Depression     Diabetes mellitus, type 2     History of methicillin resistant staphylococcus aureus (MRSA)     Hyperlipidemia     Hypertension     Neuropathy     Osteomyelitis        Past Surgical History:   Procedure Laterality Date    FRACTURE SURGERY  1976    arm    LUMBAR FUSION       NASAL SEPTUM SURGERY      SPINAL CORD STIMULATOR IMPLANT      SPINAL CORD STIMULATOR REMOVAL      TOE AMPUTATION      rt foot great to and 5th toe;lt foot great,4th and 5th toes.       Social History    reports that he quit smoking about 14 months ago. His smoking use included cigarettes. He started smoking about 36 years ago. He has a 35.00 pack-year smoking history. He has been exposed to tobacco smoke. He has never used smokeless tobacco. He reports that he does not currently use alcohol. He reports current drug use. Drug: Oxycodone.    Family History  's family history includes Bladder Cancer in his father; COPD in his mother; Hypertension in his brother; Lung cancer in his father.    Medications and Allergies     Medications  Outpatient Medications Marked as Taking for the 3/17/23 encounter (Office Visit) with Cam Sanon, DO   Medication Sig Dispense Refill    albuterol (PROAIR HFA) 90 mcg/actuation inhaler Inhale 1-2 puffs into the lungs every 4 to 6 hours as needed for Wheezing. Rescue 18 g 0    baclofen (LIORESAL) 20 MG tablet Take 20 mg by mouth every 6 (six) hours as needed.      gabapentin (NEURONTIN) 600 MG tablet 1.5 tablets every 6 hours      oxyCODONE-acetaminophen (PERCOCET) 7.5-325 mg per tablet Take 1 tablet by mouth every 8 (eight) hours as needed.      [DISCONTINUED] amLODIPine (NORVASC) 5 MG tablet TAKE 1 TABLET (5 MG TOTAL) BY MOUTH ONCE DAILY. 90 tablet 1    [DISCONTINUED] metFORMIN (GLUCOPHAGE-XR) 500 MG ER 24hr tablet TAKE 2 TABLETS TWICE DAILY WITH MEALS 360 tablet 1       Allergies  Review of patient's allergies indicates:   Allergen Reactions    Levofloxacin (bulk) Other (See Comments)     Worsened muscle spasms       Physical Examination     Vitals:    03/17/23 0839   BP: (!) 140/96   Pulse: 72   Resp: 19   Temp: 97.5 °F (36.4 °C)     Physical Exam  Constitutional:       Appearance: Normal appearance. He is obese.   HENT:      Head: Normocephalic.      Nose: Nose normal. No  congestion.      Mouth/Throat:      Mouth: Mucous membranes are moist.      Pharynx: Oropharynx is clear. No oropharyngeal exudate or posterior oropharyngeal erythema.   Eyes:      General: No scleral icterus.        Right eye: No discharge.         Left eye: No discharge.      Conjunctiva/sclera: Conjunctivae normal.      Pupils: Pupils are equal, round, and reactive to light.   Neck:      Vascular: No carotid bruit.   Cardiovascular:      Rate and Rhythm: Normal rate and regular rhythm.      Pulses:           Dorsalis pedis pulses are 1+ on the right side and 1+ on the left side.        Posterior tibial pulses are 1+ on the right side and 1+ on the left side.      Heart sounds: Normal heart sounds. No murmur heard.  Pulmonary:      Effort: Pulmonary effort is normal.      Breath sounds: Normal breath sounds. No wheezing.   Abdominal:      General: Abdomen is flat. Bowel sounds are normal.      Tenderness: There is no abdominal tenderness.   Musculoskeletal:         General: Normal range of motion.      Cervical back: Normal range of motion and neck supple.      Right lower leg: No edema.      Left lower leg: No edema.        Feet:    Feet:      Right foot:      Protective Sensation: 10 sites tested.  0 sites sensed.      Skin integrity: Callus and dry skin present.      Toenail Condition: Right toenails are abnormally thick.      Left foot:      Protective Sensation: 6 sites tested.  10 sites sensed.      Skin integrity: Ulcer, callus and dry skin present.      Toenail Condition: Left toenails are abnormally thick.      Comments: Missing toes 1 and 5 on the right and 1, 4 and 5 on the left with heel calluses without cracking    Ulcer 3 mm left large toe stump with 2 mm depth  Skin:     General: Skin is warm.      Capillary Refill: Capillary refill takes less than 2 seconds.      Findings: Lesion present.      Comments: See foot exam but he has a 3 mm ulcer that is down to the fatty tissue proximally 2 mm D no  erythema or drainage is noted   Neurological:      General: No focal deficit present.      Mental Status: He is alert and oriented to person, place, and time. Mental status is at baseline.      Cranial Nerves: No cranial nerve deficit.      Sensory: Sensory deficit present.      Motor: No weakness.      Coordination: Coordination normal.      Gait: Gait abnormal.      Deep Tendon Reflexes: Reflexes normal.   Psychiatric:         Mood and Affect: Mood normal.         Behavior: Behavior normal.         Thought Content: Thought content normal.         Judgment: Judgment normal.             Lab Results   Component Value Date    WBC 10.51 02/16/2022    HGB 14.7 02/16/2022    HCT 43.5 02/16/2022    MCV 95.2 02/16/2022     02/16/2022          Sodium   Date Value Ref Range Status   02/16/2022 140 136 - 145 mmol/L Final     Potassium   Date Value Ref Range Status   02/16/2022 4.5 3.5 - 5.1 mmol/L Final     Chloride   Date Value Ref Range Status   02/16/2022 105 98 - 107 mmol/L Final     CO2   Date Value Ref Range Status   02/16/2022 31 21 - 32 mmol/L Final     Glucose   Date Value Ref Range Status   02/16/2022 115 (H) 74 - 106 mg/dL Final     BUN   Date Value Ref Range Status   02/16/2022 12 7 - 18 mg/dL Final     Creatinine   Date Value Ref Range Status   02/16/2022 0.86 0.70 - 1.30 mg/dL Final     Calcium   Date Value Ref Range Status   02/16/2022 9.2 8.5 - 10.1 mg/dL Final     Total Protein   Date Value Ref Range Status   02/16/2022 7.4 6.4 - 8.2 g/dL Final     Albumin   Date Value Ref Range Status   02/16/2022 4.0 3.5 - 5.0 g/dL Final     Bilirubin, Total   Date Value Ref Range Status   02/16/2022 0.5 0.0 - 1.2 mg/dL Final     Alk Phos   Date Value Ref Range Status   02/16/2022 77 45 - 115 U/L Final     AST   Date Value Ref Range Status   02/16/2022 20 15 - 37 U/L Final     ALT   Date Value Ref Range Status   02/16/2022 36 16 - 61 U/L Final     Anion Gap   Date Value Ref Range Status   02/16/2022 9 7 - 16 mmol/L  Final     eGFR    Date Value Ref Range Status   04/26/2021 91       eGFR   Date Value Ref Range Status   02/16/2022 100 >=60 mL/min/1.73m² Final      No image results found.     Procedures   Lab Results   Component Value Date    CHOL 108 02/16/2022    CHOL 179 08/02/2021     Lab Results   Component Value Date    HDL 39 (L) 02/16/2022    HDL 24 (L) 08/02/2021     Lab Results   Component Value Date    LDLCALC 41 02/16/2022    LDLCALC  08/02/2021      Comment:      Unable to calculate due to one of the following values:  Cholesterol <5  HDL Cholesterol <5  Triglycerides <10 or >400     Lab Results   Component Value Date    TRIG 139 02/16/2022    TRIG 423 (H) 08/02/2021     Lab Results   Component Value Date    CHOLHDL 2.8 02/16/2022    CHOLHDL 7.5 08/02/2021      Lab Results   Component Value Date    HGBA1C 6.2 02/16/2022      No results found for: TSH, Z4LJEDE, G2VNTWV, THYROIDAB, FREET4   Assessment and Plan (including Health Maintenance)      Problem List  Smart Sets  Document Outside HM   :    Plan:         Health Maintenance Due   Topic Date Due    PROSTATE-SPECIFIC ANTIGEN  Never done    TETANUS VACCINE  Never done    Low Dose Statin  Never done    Colorectal Cancer Screening  Never done    Pneumococcal Vaccines (Age 0-64) (2 - PCV) 08/28/2016    Eye Exam  01/26/2017    LDCT Lung Screen  Never done    Shingles Vaccine (1 of 2) Never done    Hemoglobin A1c  08/16/2022    Lipid Panel  02/16/2023    Diabetes Urine Screening  02/16/2023       Problem List Items Addressed This Visit          Neuro    Neuropathy    Relevant Orders    Vitamin B12 & Folate    Vitamin B12 deficiency neuropathy    Current Assessment & Plan     Will check a B12 level today.  Neuropathy is still apparent with no sensation in his lower extremities particular when low his knees.  Likely this is also related to his diabetes.            Psychiatric    Depression       Cardiac/Vascular    Essential hypertension - Primary     Current Assessment & Plan     Blood pressure poorly controlled today so will increase his amlodipine to 10 mg daily.  Recheck him in 1 month.  If he is still up and will add an Ace or Arb.         Relevant Medications    amLODIPine (NORVASC) 5 MG tablet    Other Relevant Orders    CBC Auto Differential    Comprehensive Metabolic Panel    T4    TSH    Hyperlipidemia    Current Assessment & Plan     Last cholesterol at target of LDL of 70 or 55 if previous cardiovascular event.  Continue current meds.  Folloup in 6 months         Relevant Orders    Lipid Panel       Endocrine    Type 2 diabetes mellitus with neurologic complication, without long-term current use of insulin    Current Assessment & Plan     Diabetes is been well controlled however his A1c is unknown for year.  Will check an A1c lipid panel urine albumin creatinine ratio on him today.  CBC and CMP were also done.  Will check a TSH and T4 also.  Follow-up every 3 months.  Continue his metformin to twice daily so 1000 mg twice daily         Relevant Medications    metFORMIN (GLUCOPHAGE-XR) 500 MG ER 24hr tablet    Other Relevant Orders    Comprehensive Metabolic Panel    Lipid Panel    Hemoglobin A1C    Microalbumin/Creatinine Ratio, Urine    Foot Exam Performed (Completed)       Orthopedic    Ulcer of left foot, limited to breakdown of skin    Current Assessment & Plan     Ulcer of his left foot a proximally 3 mm over the distal stump of his large toe.  No tenderness or drainage noted.  Remove packing.  Proximally 2 mm D. redressed wound.  Continue wound care          Other Visit Diagnoses       Vitamin B 12 deficiency        Relevant Orders    Vitamin B12 & Folate            Health Maintenance Topics with due status: Not Due       Topic Last Completion Date    Foot Exam 03/17/2023       No future appointments.     Follow up in about 4 weeks (around 4/14/2023).     Signature:  DO [facility]io [facility]  [address]   [place]   [id number]    Date of encounter: 3/17/23

## 2023-03-17 NOTE — ASSESSMENT & PLAN NOTE
Blood pressure poorly controlled today so will increase his amlodipine to 10 mg daily.  Recheck him in 1 month.  If he is still up and will add an Ace or Arb.

## 2023-03-17 NOTE — ASSESSMENT & PLAN NOTE
Last cholesterol at target of LDL of 70 or 55 if previous cardiovascular event.  Continue current meds.  Folloup in 6 months

## 2023-04-14 ENCOUNTER — OFFICE VISIT (OUTPATIENT)
Dept: FAMILY MEDICINE | Facility: CLINIC | Age: 53
End: 2023-04-14
Payer: MEDICARE

## 2023-04-14 VITALS
RESPIRATION RATE: 18 BRPM | HEIGHT: 70 IN | WEIGHT: 206.81 LBS | HEART RATE: 75 BPM | DIASTOLIC BLOOD PRESSURE: 82 MMHG | SYSTOLIC BLOOD PRESSURE: 122 MMHG | OXYGEN SATURATION: 96 % | BODY MASS INDEX: 29.61 KG/M2 | TEMPERATURE: 98 F

## 2023-04-14 DIAGNOSIS — I10 ESSENTIAL HYPERTENSION: ICD-10-CM

## 2023-04-14 PROCEDURE — 1160F RVW MEDS BY RX/DR IN RCRD: CPT | Mod: ,,, | Performed by: NURSE PRACTITIONER

## 2023-04-14 PROCEDURE — 3079F PR MOST RECENT DIASTOLIC BLOOD PRESSURE 80-89 MM HG: ICD-10-PCS | Mod: ,,, | Performed by: NURSE PRACTITIONER

## 2023-04-14 PROCEDURE — 99213 PR OFFICE/OUTPT VISIT, EST, LEVL III, 20-29 MIN: ICD-10-PCS | Mod: ,,, | Performed by: NURSE PRACTITIONER

## 2023-04-14 PROCEDURE — 3079F DIAST BP 80-89 MM HG: CPT | Mod: ,,, | Performed by: NURSE PRACTITIONER

## 2023-04-14 PROCEDURE — 3060F POS MICROALBUMINURIA REV: CPT | Mod: ,,, | Performed by: NURSE PRACTITIONER

## 2023-04-14 PROCEDURE — 3066F PR DOCUMENTATION OF TREATMENT FOR NEPHROPATHY: ICD-10-PCS | Mod: ,,, | Performed by: NURSE PRACTITIONER

## 2023-04-14 PROCEDURE — 3044F PR MOST RECENT HEMOGLOBIN A1C LEVEL <7.0%: ICD-10-PCS | Mod: ,,, | Performed by: NURSE PRACTITIONER

## 2023-04-14 PROCEDURE — 3074F PR MOST RECENT SYSTOLIC BLOOD PRESSURE < 130 MM HG: ICD-10-PCS | Mod: ,,, | Performed by: NURSE PRACTITIONER

## 2023-04-14 PROCEDURE — 1160F PR REVIEW ALL MEDS BY PRESCRIBER/CLIN PHARMACIST DOCUMENTED: ICD-10-PCS | Mod: ,,, | Performed by: NURSE PRACTITIONER

## 2023-04-14 PROCEDURE — 3008F PR BODY MASS INDEX (BMI) DOCUMENTED: ICD-10-PCS | Mod: ,,, | Performed by: NURSE PRACTITIONER

## 2023-04-14 PROCEDURE — 1159F PR MEDICATION LIST DOCUMENTED IN MEDICAL RECORD: ICD-10-PCS | Mod: ,,, | Performed by: NURSE PRACTITIONER

## 2023-04-14 PROCEDURE — 3060F PR POS MICROALBUMINURIA RESULT DOCUMENTED/REVIEW: ICD-10-PCS | Mod: ,,, | Performed by: NURSE PRACTITIONER

## 2023-04-14 PROCEDURE — 3008F BODY MASS INDEX DOCD: CPT | Mod: ,,, | Performed by: NURSE PRACTITIONER

## 2023-04-14 PROCEDURE — 3074F SYST BP LT 130 MM HG: CPT | Mod: ,,, | Performed by: NURSE PRACTITIONER

## 2023-04-14 PROCEDURE — 3044F HG A1C LEVEL LT 7.0%: CPT | Mod: ,,, | Performed by: NURSE PRACTITIONER

## 2023-04-14 PROCEDURE — 3066F NEPHROPATHY DOC TX: CPT | Mod: ,,, | Performed by: NURSE PRACTITIONER

## 2023-04-14 PROCEDURE — 1159F MED LIST DOCD IN RCRD: CPT | Mod: ,,, | Performed by: NURSE PRACTITIONER

## 2023-04-14 PROCEDURE — 99213 OFFICE O/P EST LOW 20 MIN: CPT | Mod: ,,, | Performed by: NURSE PRACTITIONER

## 2023-04-14 NOTE — PROGRESS NOTES
"   Radha Roman NP   [place] [address]  [place]  [address]      PATIENT NAME: Bruno Levy  : 1970  DATE: 23  MRN: [id number]      Billing Provider: Radha Roman NP  Level of Service:   Patient PCP Information       Provider PCP Type    Cam Sanon DO General            Reason for Visit / Chief Complaint: Hypertension (Here for 1 month follow up on blood pressure.  He reports that his blood pressure has gone from 140's and 150's down to 110's and 120's.  He has had it checked at wound care and at another Dr. appointment.)         History of Present Illness / Problem Focused Workflow     Bruno Levy presents to the clinic with Hypertension (Here for 1 month follow up on blood pressure.  He reports that his blood pressure has gone from 140's and 150's down to 110's and 120's.  He has had it checked at wound care and at another Dr. appointment.)     52 year old male presents to clinic for 1 month follow up on HTN after having his medication increased. He states he has been having it checked at wound care appts and it has come down and is usually in the 110s and 120s. Reports no other problems today. He does have ulcer to left foot but refuses assessment today. States "They take care of that at wound care."      Review of Systems     @Review of Systems   Constitutional:  Negative for activity change, appetite change, fatigue and fever.   HENT:  Negative for nasal congestion, ear pain, rhinorrhea, sinus pressure/congestion and sore throat.    Eyes:  Negative for pain, redness, visual disturbance and eye dryness.   Respiratory:  Negative for cough and shortness of breath.    Cardiovascular:  Negative for chest pain and leg swelling.   Gastrointestinal:  Negative for abdominal distention, abdominal pain, constipation and diarrhea.   Endocrine: Negative for cold intolerance, heat intolerance and polyuria.   Genitourinary:  Negative for bladder incontinence, dysuria, " frequency and urgency.   Musculoskeletal:  Negative for arthralgias, gait problem and myalgias.   Integumentary:  Positive for wound. Negative for color change and rash.   Allergic/Immunologic: Negative for environmental allergies and food allergies.   Neurological:  Negative for dizziness, weakness, light-headedness and headaches.   Psychiatric/Behavioral:  Negative for behavioral problems and sleep disturbance.      Medical / Social / Family History     Past Medical History:   Diagnosis Date    Depression     Diabetes mellitus, type 2     History of methicillin resistant staphylococcus aureus (MRSA)     Hyperlipidemia     Hypertension     Neuropathy     Osteomyelitis        Past Surgical History:   Procedure Laterality Date    FRACTURE SURGERY  1976    arm    LUMBAR FUSION      NASAL SEPTUM SURGERY      SPINAL CORD STIMULATOR IMPLANT      SPINAL CORD STIMULATOR REMOVAL      TOE AMPUTATION      rt foot great to and 5th toe;lt foot great,4th and 5th toes.       Social History    reports that he quit smoking about 15 months ago. His smoking use included cigarettes. He started smoking about 36 years ago. He has a 35.00 pack-year smoking history. He has been exposed to tobacco smoke. He has never used smokeless tobacco. He reports that he does not currently use alcohol. He reports current drug use. Drug: Oxycodone.    Family History  's family history includes Bladder Cancer in his father; COPD in his mother; Hypertension in his brother; Lung cancer in his father.    Medications and Allergies     Medications  Outpatient Medications Marked as Taking for the 4/14/23 encounter (Office Visit) with Radha Roman NP   Medication Sig Dispense Refill    amLODIPine (NORVASC) 5 MG tablet Take 2 tablets (10 mg total) by mouth once daily. 90 tablet 1    baclofen (LIORESAL) 20 MG tablet Take 20 mg by mouth every 6 (six) hours as needed.      gabapentin (NEURONTIN) 600 MG tablet 1.5 tablets every 6 hours      metFORMIN  (GLUCOPHAGE-XR) 500 MG ER 24hr tablet TAKE 2 TABLETS TWICE DAILY WITH MEALS 360 tablet 1    oxyCODONE-acetaminophen (PERCOCET) 7.5-325 mg per tablet Take 1 tablet by mouth every 8 (eight) hours as needed.         Allergies  Review of patient's allergies indicates:   Allergen Reactions    Levofloxacin (bulk) Other (See Comments)     Worsened muscle spasms       Physical Examination     Vitals:    04/14/23 0925   BP: 122/82   Pulse: 75   Resp: 18   Temp: 98.2 °F (36.8 °C)     Physical Exam  Vitals and nursing note reviewed.   HENT:      Head: Normocephalic.      Right Ear: Tympanic membrane normal.      Left Ear: Tympanic membrane normal.      Nose: Nose normal.      Mouth/Throat:      Mouth: Mucous membranes are moist.      Pharynx: Oropharynx is clear. No posterior oropharyngeal erythema.   Eyes:      Conjunctiva/sclera: Conjunctivae normal.   Cardiovascular:      Rate and Rhythm: Normal rate and regular rhythm.      Pulses: Normal pulses.      Heart sounds: Normal heart sounds.   Pulmonary:      Effort: Pulmonary effort is normal.      Breath sounds: Normal breath sounds.   Abdominal:      General: Abdomen is flat. Bowel sounds are normal. There is no distension.      Palpations: Abdomen is soft.   Musculoskeletal:         General: No swelling or tenderness. Normal range of motion.      Cervical back: Normal range of motion.      Right lower leg: No edema.      Left lower leg: No edema.   Skin:     General: Skin is warm and dry.      Capillary Refill: Capillary refill takes less than 2 seconds.   Neurological:      Mental Status: He is alert. Mental status is at baseline.   Psychiatric:         Mood and Affect: Mood normal.         Behavior: Behavior normal.             Lab Results   Component Value Date    WBC 8.49 03/17/2023    HGB 14.5 03/17/2023    HCT 43.6 03/17/2023    MCV 94.6 03/17/2023     03/17/2023        CMP  Sodium   Date Value Ref Range Status   03/17/2023 137 136 - 145 mmol/L Final      Potassium   Date Value Ref Range Status   03/17/2023 4.0 3.5 - 5.1 mmol/L Final     Chloride   Date Value Ref Range Status   03/17/2023 101 98 - 107 mmol/L Final     CO2   Date Value Ref Range Status   03/17/2023 32 21 - 32 mmol/L Final     Glucose   Date Value Ref Range Status   03/17/2023 142 (H) 74 - 106 mg/dL Final     BUN   Date Value Ref Range Status   03/17/2023 15 7 - 18 mg/dL Final     Creatinine   Date Value Ref Range Status   03/17/2023 0.86 0.70 - 1.30 mg/dL Final     Calcium   Date Value Ref Range Status   03/17/2023 9.3 8.5 - 10.1 mg/dL Final     Total Protein   Date Value Ref Range Status   03/17/2023 7.5 6.4 - 8.2 g/dL Final     Albumin   Date Value Ref Range Status   03/17/2023 4.2 3.5 - 5.0 g/dL Final     Bilirubin, Total   Date Value Ref Range Status   03/17/2023 0.6 >0.0 - 1.2 mg/dL Final     Alk Phos   Date Value Ref Range Status   03/17/2023 76 45 - 115 U/L Final     AST   Date Value Ref Range Status   03/17/2023 36 15 - 37 U/L Final     ALT   Date Value Ref Range Status   03/17/2023 68 (H) 16 - 61 U/L Final     Anion Gap   Date Value Ref Range Status   03/17/2023 8 7 - 16 mmol/L Final     eGFR   Date Value Ref Range Status   03/17/2023 104 >=60 mL/min/1.73m² Final     Procedures   Assessment and Plan (including Health Maintenance)   :    Plan:           Problem List Items Addressed This Visit    None      Health Maintenance Topics with due status: Not Due       Topic Last Completion Date    Diabetes Urine Screening 03/17/2023    Foot Exam 03/17/2023    Lipid Panel 03/17/2023    Hemoglobin A1c 03/17/2023       No future appointments.     Health Maintenance Due   Topic Date Due    PROSTATE-SPECIFIC ANTIGEN  Never done    TETANUS VACCINE  Never done    Low Dose Statin  Never done    Colorectal Cancer Screening  Never done    Pneumococcal Vaccines (Age 0-64) (2 - PCV) 08/28/2016    Eye Exam  01/26/2017    LDCT Lung Screen  Never done    Shingles Vaccine (1 of 2) Never done        No follow-ups  on file.     Signature:  Radha Roman NP  [address]  [place]  [address]    Date of encounter: 4/14/23

## 2023-05-05 ENCOUNTER — OFFICE VISIT (OUTPATIENT)
Dept: FAMILY MEDICINE | Facility: CLINIC | Age: 53
End: 2023-05-05
Payer: MEDICARE

## 2023-05-05 VITALS
BODY MASS INDEX: 30.18 KG/M2 | SYSTOLIC BLOOD PRESSURE: 138 MMHG | DIASTOLIC BLOOD PRESSURE: 88 MMHG | WEIGHT: 210.81 LBS | OXYGEN SATURATION: 97 % | HEART RATE: 90 BPM | RESPIRATION RATE: 18 BRPM | HEIGHT: 70 IN | TEMPERATURE: 98 F

## 2023-05-05 DIAGNOSIS — F32.A DEPRESSION, UNSPECIFIED DEPRESSION TYPE: ICD-10-CM

## 2023-05-05 DIAGNOSIS — G62.9 NEUROPATHY: Primary | ICD-10-CM

## 2023-05-05 DIAGNOSIS — I10 ESSENTIAL HYPERTENSION: ICD-10-CM

## 2023-05-05 DIAGNOSIS — M51.9 LUMBAR DISC DISEASE: ICD-10-CM

## 2023-05-05 DIAGNOSIS — E78.2 MIXED HYPERLIPIDEMIA: ICD-10-CM

## 2023-05-05 PROCEDURE — 1159F PR MEDICATION LIST DOCUMENTED IN MEDICAL RECORD: ICD-10-PCS | Mod: ,,, | Performed by: FAMILY MEDICINE

## 2023-05-05 PROCEDURE — 3075F SYST BP GE 130 - 139MM HG: CPT | Mod: ,,, | Performed by: FAMILY MEDICINE

## 2023-05-05 PROCEDURE — 3066F NEPHROPATHY DOC TX: CPT | Mod: ,,, | Performed by: FAMILY MEDICINE

## 2023-05-05 PROCEDURE — 3044F PR MOST RECENT HEMOGLOBIN A1C LEVEL <7.0%: ICD-10-PCS | Mod: ,,, | Performed by: FAMILY MEDICINE

## 2023-05-05 PROCEDURE — 3079F DIAST BP 80-89 MM HG: CPT | Mod: ,,, | Performed by: FAMILY MEDICINE

## 2023-05-05 PROCEDURE — 1159F MED LIST DOCD IN RCRD: CPT | Mod: ,,, | Performed by: FAMILY MEDICINE

## 2023-05-05 PROCEDURE — 99214 PR OFFICE/OUTPT VISIT, EST, LEVL IV, 30-39 MIN: ICD-10-PCS | Mod: ,,, | Performed by: FAMILY MEDICINE

## 2023-05-05 PROCEDURE — 3060F POS MICROALBUMINURIA REV: CPT | Mod: ,,, | Performed by: FAMILY MEDICINE

## 2023-05-05 PROCEDURE — 3044F HG A1C LEVEL LT 7.0%: CPT | Mod: ,,, | Performed by: FAMILY MEDICINE

## 2023-05-05 PROCEDURE — 3079F PR MOST RECENT DIASTOLIC BLOOD PRESSURE 80-89 MM HG: ICD-10-PCS | Mod: ,,, | Performed by: FAMILY MEDICINE

## 2023-05-05 PROCEDURE — 3060F PR POS MICROALBUMINURIA RESULT DOCUMENTED/REVIEW: ICD-10-PCS | Mod: ,,, | Performed by: FAMILY MEDICINE

## 2023-05-05 PROCEDURE — 3008F BODY MASS INDEX DOCD: CPT | Mod: ,,, | Performed by: FAMILY MEDICINE

## 2023-05-05 PROCEDURE — 3075F PR MOST RECENT SYSTOLIC BLOOD PRESS GE 130-139MM HG: ICD-10-PCS | Mod: ,,, | Performed by: FAMILY MEDICINE

## 2023-05-05 PROCEDURE — 3008F PR BODY MASS INDEX (BMI) DOCUMENTED: ICD-10-PCS | Mod: ,,, | Performed by: FAMILY MEDICINE

## 2023-05-05 PROCEDURE — 99214 OFFICE O/P EST MOD 30 MIN: CPT | Mod: ,,, | Performed by: FAMILY MEDICINE

## 2023-05-05 PROCEDURE — 3066F PR DOCUMENTATION OF TREATMENT FOR NEPHROPATHY: ICD-10-PCS | Mod: ,,, | Performed by: FAMILY MEDICINE

## 2023-05-05 RX ORDER — ATORVASTATIN CALCIUM 40 MG/1
40 TABLET, FILM COATED ORAL DAILY
Qty: 90 TABLET | Refills: 3 | Status: SHIPPED | OUTPATIENT
Start: 2023-05-05 | End: 2024-01-18 | Stop reason: SDUPTHER

## 2023-05-05 NOTE — ASSESSMENT & PLAN NOTE
Major depression that is currently controlled.  No change in his medication.  Recommend counseling.  Follow-up every 3 months.

## 2023-05-05 NOTE — PROGRESS NOTES
[facility]   Cam Sanon DO   [address]  [place]  [address]      PATIENT NAME: Bruno Levy  : 1970  DATE: 23  MRN: [id number]      Billing Provider: Cam Sanon DO  Level of Service:   Patient PCP Information       Provider PCP Type    Cam Sanon DO General            Reason for Visit / Chief Complaint: Referral (Needs referral to a Dr that can fill out his disability paperwork and treat his preexisting conditions.)       Update PCP  Update Chief Complaint         History of Present Illness / Problem Focused Workflow     Bruno Levy presents to the clinic with Referral (Needs referral to a Dr that can fill out his disability paperwork and treat his preexisting conditions.)     Patient needs paperwork done for his disability with private [employer].  He needs fiber work filled out.  Patient has not had in re-evaluation so he will need to go for physical therapy evaluation of his potential as well as treatment for his chronic back pain along with treatment he is receiving for pain management.  Patient does continue to have numbness in his legs.  He does continue to have difficulty with ambulation.  Patient has no sensation in his lower legs.      Review of Systems     Review of Systems   Constitutional:  Negative for activity change, appetite change, chills, fatigue and fever.   HENT:  Negative for nasal congestion, ear discharge, ear pain, mouth dryness, mouth sores, postnasal drip, sinus pressure/congestion, sore throat and voice change.    Eyes:  Negative for pain, discharge, redness, itching and visual disturbance.   Respiratory:  Positive for shortness of breath. Negative for apnea, cough, chest tightness and wheezing.    Cardiovascular:  Negative for chest pain, palpitations and leg swelling.   Gastrointestinal:  Negative for abdominal distention, abdominal pain, anal bleeding, blood in stool, change in bowel habit, constipation, diarrhea, nausea,  vomiting, reflux and change in bowel habit.   Endocrine: Negative for cold intolerance, heat intolerance, polydipsia, polyphagia and polyuria.   Genitourinary:  Negative for difficulty urinating, enuresis, erectile dysfunction, frequency, genital sores, hematuria and urgency.   Musculoskeletal:  Positive for arthralgias, back pain, gait problem and leg pain. Negative for myalgias and neck pain.   Integumentary:  Negative for rash, mole/lesion, breast mass and breast discharge.   Allergic/Immunologic: Negative for environmental allergies and food allergies.   Neurological:  Positive for weakness and numbness. Negative for dizziness, vertigo, tremors, seizures, syncope, facial asymmetry, speech difficulty, light-headedness, headaches, coordination difficulties, memory loss and coordination difficulties.   Hematological:  Negative for adenopathy. Does not bruise/bleed easily.   Psychiatric/Behavioral:  Positive for decreased concentration. Negative for agitation, behavioral problems, confusion, dysphoric mood, hallucinations, self-injury, sleep disturbance and suicidal ideas. The patient is nervous/anxious. The patient is not hyperactive.    Breast: Negative for mass    Medical / Social / Family History     Past Medical History:   Diagnosis Date    Depression     Diabetes mellitus, type 2     History of methicillin resistant staphylococcus aureus (MRSA)     Hyperlipidemia     Hypertension     Neuropathy     Osteomyelitis        Past Surgical History:   Procedure Laterality Date    FRACTURE SURGERY  1976    arm    LUMBAR FUSION      NASAL SEPTUM SURGERY      SPINAL CORD STIMULATOR IMPLANT      SPINAL CORD STIMULATOR REMOVAL      TOE AMPUTATION      rt foot great to and 5th toe;lt foot great,4th and 5th toes.       Social History    reports that he quit smoking about 16 months ago. His smoking use included cigarettes. He started smoking about 36 years ago. He has a 35.00 pack-year smoking history. He has been exposed  to tobacco smoke. He has never used smokeless tobacco. He reports that he does not currently use alcohol. He reports current drug use. Drug: Oxycodone.    Family History  's family history includes Bladder Cancer in his father; COPD in his mother; Hypertension in his brother; Lung cancer in his father.    Medications and Allergies     Medications  Outpatient Medications Marked as Taking for the 5/5/23 encounter (Office Visit) with Cam Sanon, DO   Medication Sig Dispense Refill    amLODIPine (NORVASC) 5 MG tablet Take 2 tablets (10 mg total) by mouth once daily. 90 tablet 1    baclofen (LIORESAL) 20 MG tablet Take 20 mg by mouth every 6 (six) hours as needed.      gabapentin (NEURONTIN) 600 MG tablet 1.5 tablets every 6 hours      metFORMIN (GLUCOPHAGE-XR) 500 MG ER 24hr tablet TAKE 2 TABLETS TWICE DAILY WITH MEALS 360 tablet 1    oxyCODONE-acetaminophen (PERCOCET) 7.5-325 mg per tablet Take 1 tablet by mouth every 8 (eight) hours as needed.         Allergies  Review of patient's allergies indicates:   Allergen Reactions    Levofloxacin (bulk) Other (See Comments)     Worsened muscle spasms       Physical Examination     Vitals:    05/05/23 1331   BP: 138/88   Pulse: 90   Resp: 18   Temp: 98.4 °F (36.9 °C)     Physical Exam  Constitutional:       Appearance: Normal appearance.   HENT:      Head: Normocephalic.      Nose: Nose normal.      Mouth/Throat:      Mouth: Mucous membranes are moist.      Pharynx: Oropharynx is clear. No oropharyngeal exudate or posterior oropharyngeal erythema.   Eyes:      General: No scleral icterus.        Right eye: No discharge.         Left eye: No discharge.      Conjunctiva/sclera: Conjunctivae normal.      Pupils: Pupils are equal, round, and reactive to light.   Cardiovascular:      Rate and Rhythm: Normal rate and regular rhythm.      Pulses: Normal pulses.      Heart sounds: Normal heart sounds. No murmur heard.  Pulmonary:      Effort: Pulmonary effort is normal.       Breath sounds: Normal breath sounds. No wheezing.   Abdominal:      General: Abdomen is flat. Bowel sounds are normal.   Musculoskeletal:         General: Tenderness present. Normal range of motion.      Comments: Limited range of motion lumbar spine.  Positive straight leg raising bilateral.  Decreased sensation both lower extremities below the knees bilateral   Skin:     General: Skin is warm.      Capillary Refill: Capillary refill takes less than 2 seconds.      Findings: Lesion present.   Neurological:      General: No focal deficit present.      Mental Status: He is alert and oriented to person, place, and time.      Cranial Nerves: No cranial nerve deficit.      Sensory: Sensory deficit present.      Motor: Weakness present.      Coordination: Coordination normal.      Gait: Gait abnormal.      Deep Tendon Reflexes: Reflexes normal.   Psychiatric:         Mood and Affect: Mood normal.         Behavior: Behavior normal.             Lab Results   Component Value Date    WBC 8.49 03/17/2023    HGB 14.5 03/17/2023    HCT 43.6 03/17/2023    MCV 94.6 03/17/2023     03/17/2023          Sodium   Date Value Ref Range Status   03/17/2023 137 136 - 145 mmol/L Final     Potassium   Date Value Ref Range Status   03/17/2023 4.0 3.5 - 5.1 mmol/L Final     Chloride   Date Value Ref Range Status   03/17/2023 101 98 - 107 mmol/L Final     CO2   Date Value Ref Range Status   03/17/2023 32 21 - 32 mmol/L Final     Glucose   Date Value Ref Range Status   03/17/2023 142 (H) 74 - 106 mg/dL Final     BUN   Date Value Ref Range Status   03/17/2023 15 7 - 18 mg/dL Final     Creatinine   Date Value Ref Range Status   03/17/2023 0.86 0.70 - 1.30 mg/dL Final     Calcium   Date Value Ref Range Status   03/17/2023 9.3 8.5 - 10.1 mg/dL Final     Total Protein   Date Value Ref Range Status   03/17/2023 7.5 6.4 - 8.2 g/dL Final     Albumin   Date Value Ref Range Status   03/17/2023 4.2 3.5 - 5.0 g/dL Final     Bilirubin, Total   Date  Value Ref Range Status   03/17/2023 0.6 >0.0 - 1.2 mg/dL Final     Alk Phos   Date Value Ref Range Status   03/17/2023 76 45 - 115 U/L Final     AST   Date Value Ref Range Status   03/17/2023 36 15 - 37 U/L Final     ALT   Date Value Ref Range Status   03/17/2023 68 (H) 16 - 61 U/L Final     Anion Gap   Date Value Ref Range Status   03/17/2023 8 7 - 16 mmol/L Final     eGFR    Date Value Ref Range Status   04/26/2021 91       eGFR   Date Value Ref Range Status   02/16/2022 100 >=60 mL/min/1.73m² Final      No image results found.     Procedures   Lab Results   Component Value Date    CHOL 183 03/17/2023    CHOL 108 02/16/2022    CHOL 179 08/02/2021     Lab Results   Component Value Date    HDL 36 (L) 03/17/2023    HDL 39 (L) 02/16/2022    HDL 24 (L) 08/02/2021     Lab Results   Component Value Date    LDLCALC 100 03/17/2023    LDLCALC 41 02/16/2022    LDLCALC  08/02/2021      Comment:      Unable to calculate due to one of the following values:  Cholesterol <5  HDL Cholesterol <5  Triglycerides <10 or >400     Lab Results   Component Value Date    TRIG 235 (H) 03/17/2023    TRIG 139 02/16/2022    TRIG 423 (H) 08/02/2021     Lab Results   Component Value Date    CHOLHDL 5.1 03/17/2023    CHOLHDL 2.8 02/16/2022    CHOLHDL 7.5 08/02/2021      Lab Results   Component Value Date    HGBA1C 6.4 03/17/2023      Lab Results   Component Value Date    TSH 1.600 03/17/2023    O3DWCOB 10.1 03/17/2023      Assessment and Plan (including Health Maintenance)      Problem List  Smart Sets  Document Outside HM   :    Plan:         Health Maintenance Due   Topic Date Due    PROSTATE-SPECIFIC ANTIGEN  Never done    TETANUS VACCINE  Never done    Colorectal Cancer Screening  Never done    Pneumococcal Vaccines (Age 0-64) (2 - PCV) 08/28/2016    Eye Exam  01/26/2017    LDCT Lung Screen  Never done    Shingles Vaccine (1 of 2) Never done       Problem List Items Addressed This Visit          Neuro    Lumbar disc disease     Current Assessment & Plan     Lumbar disc disease with the lumbar fusion in the past.  Patient has also had a spinal pain implanted has been removed.  He continues to have pain in his legs bilaterally and pain in his back.  He has seen in her surgeon within the last 2 years.  Patient is seen pain management currently.  Patient needs paperwork filled out for disability.  He continues to not be able to ambulate for very long.  Will send him for physical therapy evaluation of strength and agility as well as treatment for his of low back pain and his neuropathy           Relevant Orders    Ambulatory referral/consult to Physical/Occupational Therapy    Neuropathy - Primary    Current Assessment & Plan     Severe diabetic neuropathy with the on most no sensation below the mid calf.  Patient is being treated for a continued diabetic ulcer on his left foot.  He has also had amputations bilaterally of his toes due to osteomyelitis.  Patient has difficulty with his balance due to losing his large toes.  Recheck of his sensation reveals he has no sensation in his feet.           Relevant Orders    Ambulatory referral/consult to Physical/Occupational Therapy       Psychiatric    Depression    Current Assessment & Plan     Major depression that is currently controlled.  No change in his medication.  Recommend counseling.  Follow-up every 3 months.              Cardiac/Vascular    Essential hypertension    Current Assessment & Plan     Hypertension well controlled no change in medication.  Follow-up in 3 months.  Will check CBC and CMP yearly.  Goal is blood pressure 120/70           Hyperlipidemia    Current Assessment & Plan     Hyperlipidemia with poor control of his LDL which is now over 100.  Recommend he go back on atorvastatin 40 mg daily.  Recheck a lipid panel in 3 months           Relevant Medications    atorvastatin (LIPITOR) 40 MG tablet       Health Maintenance Topics with due status: Not Due       Topic Last  Completion Date    Diabetes Urine Screening 03/17/2023    Foot Exam 03/17/2023    Lipid Panel 03/17/2023    Hemoglobin A1c 03/17/2023    Low Dose Statin 05/05/2023       No future appointments.     Follow up in about 3 months (around 8/5/2023).     Signature:  Cam Sanon DO  [facility]  [id number] [place] [address]  [id number]    Date of encounter: 5/5/23

## 2023-05-05 NOTE — ASSESSMENT & PLAN NOTE
Severe diabetic neuropathy with the on most no sensation below the mid calf.  Patient is being treated for a continued diabetic ulcer on his left foot.  He has also had amputations bilaterally of his toes due to osteomyelitis.  Patient has difficulty with his balance due to losing his large toes.  Recheck of his sensation reveals he has no sensation in his feet.

## 2023-05-05 NOTE — ASSESSMENT & PLAN NOTE
Hyperlipidemia with poor control of his LDL which is now over 100.  Recommend he go back on atorvastatin 40 mg daily.  Recheck a lipid panel in 3 months

## 2023-05-05 NOTE — ASSESSMENT & PLAN NOTE
Lumbar disc disease with the lumbar fusion in the past.  Patient has also had a spinal pain implanted has been removed.  He continues to have pain in his legs bilaterally and pain in his back.  He has seen in her surgeon within the last 2 years.  Patient is seen pain management currently.  Patient needs paperwork filled out for disability.  He continues to not be able to ambulate for very long.  Will send him for physical therapy evaluation of strength and agility as well as treatment for his of low back pain and his neuropathy

## 2023-05-18 ENCOUNTER — OFFICE VISIT (OUTPATIENT)
Dept: FAMILY MEDICINE | Facility: CLINIC | Age: 53
End: 2023-05-18
Payer: MEDICARE

## 2023-05-18 VITALS
TEMPERATURE: 98 F | OXYGEN SATURATION: 95 % | DIASTOLIC BLOOD PRESSURE: 90 MMHG | RESPIRATION RATE: 17 BRPM | BODY MASS INDEX: 29.78 KG/M2 | HEART RATE: 91 BPM | WEIGHT: 208 LBS | SYSTOLIC BLOOD PRESSURE: 132 MMHG | HEIGHT: 70 IN

## 2023-05-18 DIAGNOSIS — R50.9 FEVER, UNSPECIFIED FEVER CAUSE: ICD-10-CM

## 2023-05-18 DIAGNOSIS — L73.9 FOLLICULITIS: Primary | ICD-10-CM

## 2023-05-18 LAB
CTP QC/QA: YES
CTP QC/QA: YES
FLUAV AG NPH QL: NEGATIVE
FLUBV AG NPH QL: NEGATIVE
S PYO RRNA THROAT QL PROBE: NEGATIVE
SARS-COV-2 AG RESP QL IA.RAPID: NEGATIVE

## 2023-05-18 PROCEDURE — 87428 SARSCOV & INF VIR A&B AG IA: CPT | Mod: RHCUB | Performed by: FAMILY MEDICINE

## 2023-05-18 PROCEDURE — 3060F PR POS MICROALBUMINURIA RESULT DOCUMENTED/REVIEW: ICD-10-PCS | Mod: ,,, | Performed by: FAMILY MEDICINE

## 2023-05-18 PROCEDURE — 3066F PR DOCUMENTATION OF TREATMENT FOR NEPHROPATHY: ICD-10-PCS | Mod: ,,, | Performed by: FAMILY MEDICINE

## 2023-05-18 PROCEDURE — 3075F PR MOST RECENT SYSTOLIC BLOOD PRESS GE 130-139MM HG: ICD-10-PCS | Mod: ,,, | Performed by: FAMILY MEDICINE

## 2023-05-18 PROCEDURE — 1160F PR REVIEW ALL MEDS BY PRESCRIBER/CLIN PHARMACIST DOCUMENTED: ICD-10-PCS | Mod: ,,, | Performed by: FAMILY MEDICINE

## 2023-05-18 PROCEDURE — 3044F HG A1C LEVEL LT 7.0%: CPT | Mod: ,,, | Performed by: FAMILY MEDICINE

## 2023-05-18 PROCEDURE — 1159F PR MEDICATION LIST DOCUMENTED IN MEDICAL RECORD: ICD-10-PCS | Mod: ,,, | Performed by: FAMILY MEDICINE

## 2023-05-18 PROCEDURE — 87880 STREP A ASSAY W/OPTIC: CPT | Mod: RHCUB | Performed by: FAMILY MEDICINE

## 2023-05-18 PROCEDURE — 3008F PR BODY MASS INDEX (BMI) DOCUMENTED: ICD-10-PCS | Mod: ,,, | Performed by: FAMILY MEDICINE

## 2023-05-18 PROCEDURE — 99214 PR OFFICE/OUTPT VISIT, EST, LEVL IV, 30-39 MIN: ICD-10-PCS | Mod: ,,, | Performed by: FAMILY MEDICINE

## 2023-05-18 PROCEDURE — 3066F NEPHROPATHY DOC TX: CPT | Mod: ,,, | Performed by: FAMILY MEDICINE

## 2023-05-18 PROCEDURE — 3060F POS MICROALBUMINURIA REV: CPT | Mod: ,,, | Performed by: FAMILY MEDICINE

## 2023-05-18 PROCEDURE — 3008F BODY MASS INDEX DOCD: CPT | Mod: ,,, | Performed by: FAMILY MEDICINE

## 2023-05-18 PROCEDURE — 3080F PR MOST RECENT DIASTOLIC BLOOD PRESSURE >= 90 MM HG: ICD-10-PCS | Mod: ,,, | Performed by: FAMILY MEDICINE

## 2023-05-18 PROCEDURE — 1159F MED LIST DOCD IN RCRD: CPT | Mod: ,,, | Performed by: FAMILY MEDICINE

## 2023-05-18 PROCEDURE — 3080F DIAST BP >= 90 MM HG: CPT | Mod: ,,, | Performed by: FAMILY MEDICINE

## 2023-05-18 PROCEDURE — 3044F PR MOST RECENT HEMOGLOBIN A1C LEVEL <7.0%: ICD-10-PCS | Mod: ,,, | Performed by: FAMILY MEDICINE

## 2023-05-18 PROCEDURE — 3075F SYST BP GE 130 - 139MM HG: CPT | Mod: ,,, | Performed by: FAMILY MEDICINE

## 2023-05-18 PROCEDURE — 1160F RVW MEDS BY RX/DR IN RCRD: CPT | Mod: ,,, | Performed by: FAMILY MEDICINE

## 2023-05-18 PROCEDURE — 99214 OFFICE O/P EST MOD 30 MIN: CPT | Mod: ,,, | Performed by: FAMILY MEDICINE

## 2023-05-18 RX ORDER — SULFAMETHOXAZOLE AND TRIMETHOPRIM 800; 160 MG/1; MG/1
1 TABLET ORAL 2 TIMES DAILY
Qty: 28 TABLET | Refills: 0 | Status: SHIPPED | OUTPATIENT
Start: 2023-05-18 | End: 2023-06-01

## 2023-05-19 PROBLEM — R50.9 FEVER: Status: ACTIVE | Noted: 2023-05-19

## 2023-05-19 PROBLEM — L73.9 FOLLICULITIS: Status: ACTIVE | Noted: 2023-05-19

## 2023-05-19 NOTE — ASSESSMENT & PLAN NOTE
Patient reported, currently afebrile. Tested for COVID and Strep with negative results provided to pt during encounter.

## 2023-05-19 NOTE — PROGRESS NOTES
Elvira Fair [facility]                                                           [address]                                                       [place] [address]     Subjective     Name: Bruno Levy   YOB: 1970 (52 y.o.)  MRN: [id number]  Visit Date: 05/19/2023   Chief Complaint: Fever (X2 days on and off.  101 las night ), Cyst (Cyst on face under right nostril. Noticed it Monday and has become worse. ), Sore Throat (X2 days. ), and Nasal Congestion (X2 days. Clear mucus. )      HPI:  53 yo M with PMH of T2DM, HTN and Depression who presents with c/o of skin lesion. Patient has a crusty lesion located at left naris. The lesion appears erythematous and edematous with tenderness upon palpation. Patient reports clear drainage upon removal of crust.  The lesion has been present for approximately 3 days and pt reports slight improvement upon applying mupirocin daily. He reports associated fever and is tested for COVID and Strep with negative results provided during encounter. Today we will prescribe antibiotics and have pt return in one week for follow-up.       Review of Systems   Constitutional:  Negative for appetite change, chills, fatigue and fever.   HENT:  Negative for congestion, ear pain, hearing loss, sore throat and tinnitus.    Eyes:  Negative for pain and visual disturbance.   Respiratory:  Negative for cough, shortness of breath and wheezing.    Cardiovascular:  Negative for chest pain, palpitations and leg swelling.   Gastrointestinal:  Negative for abdominal pain, constipation, nausea and vomiting.   Endocrine: Negative for cold intolerance and heat intolerance.   Genitourinary:  Negative for dysuria, frequency and hematuria.   Musculoskeletal:  Negative for arthralgias, back pain, joint swelling, myalgias and neck pain.   Skin:  Positive for wound. Negative for rash.   Neurological:  Negative for tremors,  "weakness, light-headedness, numbness and headaches.   Hematological:  Negative for adenopathy.   Psychiatric/Behavioral:  Negative for confusion and sleep disturbance.       Objective     BP (!) 132/90 (BP Location: Left arm, Patient Position: Sitting, BP Method: Medium (Manual))   Pulse 91   Temp 98.4 °F (36.9 °C) (Oral)   Resp 17   Ht 5' 10" (1.778 m)   Wt 94.3 kg (208 lb)   SpO2 95%   BMI 29.84 kg/m²       Current Outpatient Medications:     amLODIPine (NORVASC) 5 MG tablet, Take 2 tablets (10 mg total) by mouth once daily., Disp: 90 tablet, Rfl: 1    atorvastatin (LIPITOR) 40 MG tablet, Take 1 tablet (40 mg total) by mouth once daily., Disp: 90 tablet, Rfl: 3    baclofen (LIORESAL) 20 MG tablet, Take 20 mg by mouth every 6 (six) hours as needed., Disp: , Rfl:     gabapentin (NEURONTIN) 600 MG tablet, 1.5 tablets every 6 hours, Disp: , Rfl:     metFORMIN (GLUCOPHAGE-XR) 500 MG ER 24hr tablet, TAKE 2 TABLETS TWICE DAILY WITH MEALS, Disp: 360 tablet, Rfl: 1    oxyCODONE-acetaminophen (PERCOCET) 7.5-325 mg per tablet, Take 1 tablet by mouth every 8 (eight) hours as needed., Disp: , Rfl:     sulfamethoxazole-trimethoprim 800-160mg (BACTRIM DS) 800-160 mg Tab, Take 1 tablet by mouth 2 (two) times daily. for 14 days, Disp: 28 tablet, Rfl: 0    Physical Exam  Vitals and nursing note reviewed.   Constitutional:       General: He is not in acute distress.  HENT:      Head: Normocephalic.      Right Ear: External ear normal.      Left Ear: Ear canal and external ear normal.      Nose: Nose normal. No congestion or rhinorrhea.      Mouth/Throat:      Mouth: Mucous membranes are moist.      Pharynx: Oropharynx is clear. No oropharyngeal exudate or posterior oropharyngeal erythema.   Eyes:      General: No scleral icterus.        Right eye: No discharge.         Left eye: No discharge.      Conjunctiva/sclera: Conjunctivae normal.      Pupils: Pupils are equal, round, and reactive to light.   Cardiovascular:      Rate " and Rhythm: Normal rate and regular rhythm.      Pulses: Normal pulses.      Heart sounds: Normal heart sounds. No murmur heard.  Pulmonary:      Effort: Pulmonary effort is normal.      Breath sounds: Normal breath sounds. No wheezing, rhonchi or rales.   Abdominal:      General: Bowel sounds are normal. There is no distension.      Palpations: Abdomen is soft.      Tenderness: There is no abdominal tenderness.   Musculoskeletal:      Cervical back: Neck supple.      Right lower leg: No edema.      Left lower leg: No edema.   Skin:     General: Skin is warm and dry.      Findings: Lesion (1cm crusty lesion at left naris with surrounding erythema and edema) present. No rash.   Neurological:      General: No focal deficit present.      Mental Status: He is alert and oriented to person, place, and time.      Sensory: No sensory deficit.      Motor: No weakness.   Psychiatric:         Mood and Affect: Mood normal.         Behavior: Behavior normal.        All recently obtained labs have been reviewed and discussed in detail with the patient.   Assessment     1. Folliculitis    2. Fever, unspecified fever cause         Plan        Problem List Items Addressed This Visit          Derm    Folliculitis - Primary     1cm crusty lesion at left naris with surrounding erythema and edema. Bactrim DS prescribed with pt to advised to apply warm compress twice daily. Apply mupirocin daily and RTC in one week.           Relevant Medications    sulfamethoxazole-trimethoprim 800-160mg (BACTRIM DS) 800-160 mg Tab       Other    Fever     Patient reported, currently afebrile. Tested for COVID and Strep with negative results provided to pt during encounter.            Relevant Orders    POCT rapid strep A (Completed)    POCT SARS-COV2 (COVID) with Flu Antigen (Completed)       Follow up in about 1 week (around 5/25/2023).    Elvira Fair DO

## 2023-05-19 NOTE — ASSESSMENT & PLAN NOTE
1cm crusty lesion at left naris with surrounding erythema and edema. Bactrim DS prescribed with pt to advised to apply warm compress twice daily. Apply mupirocin daily and RTC in one week.

## 2023-05-22 ENCOUNTER — PATIENT OUTREACH (OUTPATIENT)
Dept: ADMINISTRATIVE | Facility: HOSPITAL | Age: 53
End: 2023-05-22

## 2023-05-24 ENCOUNTER — OFFICE VISIT (OUTPATIENT)
Dept: FAMILY MEDICINE | Facility: CLINIC | Age: 53
End: 2023-05-24
Payer: MEDICARE

## 2023-05-24 VITALS
HEART RATE: 79 BPM | TEMPERATURE: 98 F | RESPIRATION RATE: 18 BRPM | SYSTOLIC BLOOD PRESSURE: 130 MMHG | DIASTOLIC BLOOD PRESSURE: 80 MMHG | OXYGEN SATURATION: 97 % | HEIGHT: 70 IN | BODY MASS INDEX: 29.61 KG/M2 | WEIGHT: 206.81 LBS

## 2023-05-24 DIAGNOSIS — L73.9 FOLLICULITIS: Primary | ICD-10-CM

## 2023-05-24 PROCEDURE — 3079F PR MOST RECENT DIASTOLIC BLOOD PRESSURE 80-89 MM HG: ICD-10-PCS | Mod: ,,, | Performed by: FAMILY MEDICINE

## 2023-05-24 PROCEDURE — 1160F RVW MEDS BY RX/DR IN RCRD: CPT | Mod: ,,, | Performed by: FAMILY MEDICINE

## 2023-05-24 PROCEDURE — 3060F POS MICROALBUMINURIA REV: CPT | Mod: ,,, | Performed by: FAMILY MEDICINE

## 2023-05-24 PROCEDURE — 87070 CULTURE OTHR SPECIMN AEROBIC: CPT | Mod: ,,, | Performed by: CLINICAL MEDICAL LABORATORY

## 2023-05-24 PROCEDURE — 1159F MED LIST DOCD IN RCRD: CPT | Mod: ,,, | Performed by: FAMILY MEDICINE

## 2023-05-24 PROCEDURE — 3079F DIAST BP 80-89 MM HG: CPT | Mod: ,,, | Performed by: FAMILY MEDICINE

## 2023-05-24 PROCEDURE — 3075F PR MOST RECENT SYSTOLIC BLOOD PRESS GE 130-139MM HG: ICD-10-PCS | Mod: ,,, | Performed by: FAMILY MEDICINE

## 2023-05-24 PROCEDURE — 87077 CULTURE, WOUND: ICD-10-PCS | Mod: ,,, | Performed by: CLINICAL MEDICAL LABORATORY

## 2023-05-24 PROCEDURE — 3066F NEPHROPATHY DOC TX: CPT | Mod: ,,, | Performed by: FAMILY MEDICINE

## 2023-05-24 PROCEDURE — 96372 PR INJECTION,THERAP/PROPH/DIAG2ST, IM OR SUBCUT: ICD-10-PCS | Mod: 59,GC,, | Performed by: FAMILY MEDICINE

## 2023-05-24 PROCEDURE — 1160F PR REVIEW ALL MEDS BY PRESCRIBER/CLIN PHARMACIST DOCUMENTED: ICD-10-PCS | Mod: ,,, | Performed by: FAMILY MEDICINE

## 2023-05-24 PROCEDURE — 3044F HG A1C LEVEL LT 7.0%: CPT | Mod: ,,, | Performed by: FAMILY MEDICINE

## 2023-05-24 PROCEDURE — 10061 INCISION & DRAINAGE: ICD-10-PCS | Mod: GC,,, | Performed by: FAMILY MEDICINE

## 2023-05-24 PROCEDURE — 3008F PR BODY MASS INDEX (BMI) DOCUMENTED: ICD-10-PCS | Mod: ,,, | Performed by: FAMILY MEDICINE

## 2023-05-24 PROCEDURE — 96372 THER/PROPH/DIAG INJ SC/IM: CPT | Mod: 59,GC,, | Performed by: FAMILY MEDICINE

## 2023-05-24 PROCEDURE — 87077 CULTURE AEROBIC IDENTIFY: CPT | Mod: ,,, | Performed by: CLINICAL MEDICAL LABORATORY

## 2023-05-24 PROCEDURE — 3075F SYST BP GE 130 - 139MM HG: CPT | Mod: ,,, | Performed by: FAMILY MEDICINE

## 2023-05-24 PROCEDURE — 3044F PR MOST RECENT HEMOGLOBIN A1C LEVEL <7.0%: ICD-10-PCS | Mod: ,,, | Performed by: FAMILY MEDICINE

## 2023-05-24 PROCEDURE — 3060F PR POS MICROALBUMINURIA RESULT DOCUMENTED/REVIEW: ICD-10-PCS | Mod: ,,, | Performed by: FAMILY MEDICINE

## 2023-05-24 PROCEDURE — 3066F PR DOCUMENTATION OF TREATMENT FOR NEPHROPATHY: ICD-10-PCS | Mod: ,,, | Performed by: FAMILY MEDICINE

## 2023-05-24 PROCEDURE — 87070 CULTURE, WOUND: ICD-10-PCS | Mod: ,,, | Performed by: CLINICAL MEDICAL LABORATORY

## 2023-05-24 PROCEDURE — 87186 SC STD MICRODIL/AGAR DIL: CPT | Mod: ,,, | Performed by: CLINICAL MEDICAL LABORATORY

## 2023-05-24 PROCEDURE — 1159F PR MEDICATION LIST DOCUMENTED IN MEDICAL RECORD: ICD-10-PCS | Mod: ,,, | Performed by: FAMILY MEDICINE

## 2023-05-24 PROCEDURE — 3008F BODY MASS INDEX DOCD: CPT | Mod: ,,, | Performed by: FAMILY MEDICINE

## 2023-05-24 PROCEDURE — 10061 I&D ABSCESS COMP/MULTIPLE: CPT | Mod: GC,,, | Performed by: FAMILY MEDICINE

## 2023-05-24 PROCEDURE — 87186 CULTURE, WOUND: ICD-10-PCS | Mod: ,,, | Performed by: CLINICAL MEDICAL LABORATORY

## 2023-05-24 RX ORDER — CEFTRIAXONE 1 G/1
1 INJECTION, POWDER, FOR SOLUTION INTRAMUSCULAR; INTRAVENOUS
Status: COMPLETED | OUTPATIENT
Start: 2023-05-24 | End: 2023-05-24

## 2023-05-24 RX ADMIN — CEFTRIAXONE 1 G: 1 INJECTION, POWDER, FOR SOLUTION INTRAMUSCULAR; INTRAVENOUS at 02:05

## 2023-05-24 NOTE — PROGRESS NOTES
Elvira Fair DO                                                  [facility]                                                           [address]                                                       [place] [address]     Subjective     Name: Bruno Levy   YOB: 1970 (52 y.o.)  MRN: [id number]  Visit Date: 05/24/2023   Chief Complaint: Cyst (1 week follow up. )      HPI:  51 yo M who presents for follow up of facial lesion. He reports medication compliance and is currently on Day 5 of Bactrim DS. The previously crusted lesion at right naris is now an open lesion with purulent drainage. Today, an I&D of lesion was performed with wound packed and clean dressing applied. A rocephin injection administered with pt to continue oral antibiotics. He will follow up in clinic in 2 days.     Incision & Drainage    Date/Time: 5/24/2023 1:00 PM  Performed by: Elvira Fair DO  Authorized by: Cam Sanon, DO     Consent Done?:  Yes (Verbal)    Type:  Abscess  Body area:  Head/neck  Location details:  Nose  Anesthesia:  Local infiltration  Local anesthetic: Lidocaine 2% without epinephrine  Scalpel size:  11  Incision type:  Single straight  Complexity:  Simple  Drainage:  Bloody and purulent  Drainage amount:  Moderate  Wound treatment:  Wound packed  Packing material:  1/4 in iodoform gauze  Patient tolerance:  Patient tolerated the procedure well with no immediate complications     Review of Systems   Constitutional:  Negative for appetite change, chills, fatigue and fever.   HENT:  Negative for congestion, ear pain, hearing loss, sore throat and tinnitus.    Eyes:  Negative for pain and visual disturbance.   Respiratory:  Negative for cough, shortness of breath and wheezing.    Cardiovascular:  Negative for chest pain, palpitations and leg swelling.   Gastrointestinal:  Negative for abdominal pain, constipation, diarrhea, nausea and vomiting.   Endocrine: Negative for cold  "intolerance and heat intolerance.   Genitourinary:  Negative for dysuria, frequency and hematuria.   Musculoskeletal:  Negative for arthralgias, back pain, joint swelling, myalgias and neck pain.   Skin:  Positive for wound. Negative for rash.   Neurological:  Negative for tremors, weakness, light-headedness, numbness and headaches.   Hematological:  Negative for adenopathy.   Psychiatric/Behavioral:  Negative for confusion and sleep disturbance.       Objective     /80 (BP Location: Right arm, Patient Position: Sitting, BP Method: Medium (Manual))   Pulse 79   Temp 98 °F (36.7 °C) (Oral)   Resp 18   Ht 5' 10" (1.778 m)   Wt 93.8 kg (206 lb 12.8 oz)   SpO2 97%   BMI 29.67 kg/m²       Current Outpatient Medications:     amLODIPine (NORVASC) 5 MG tablet, Take 2 tablets (10 mg total) by mouth once daily., Disp: 90 tablet, Rfl: 1    atorvastatin (LIPITOR) 40 MG tablet, Take 1 tablet (40 mg total) by mouth once daily., Disp: 90 tablet, Rfl: 3    baclofen (LIORESAL) 20 MG tablet, Take 20 mg by mouth every 6 (six) hours as needed., Disp: , Rfl:     gabapentin (NEURONTIN) 600 MG tablet, 1.5 tablets every 6 hours, Disp: , Rfl:     metFORMIN (GLUCOPHAGE-XR) 500 MG ER 24hr tablet, TAKE 2 TABLETS TWICE DAILY WITH MEALS, Disp: 360 tablet, Rfl: 1    oxyCODONE-acetaminophen (PERCOCET) 7.5-325 mg per tablet, Take 1 tablet by mouth every 8 (eight) hours as needed., Disp: , Rfl:     sulfamethoxazole-trimethoprim 800-160mg (BACTRIM DS) 800-160 mg Tab, Take 1 tablet by mouth 2 (two) times daily. for 14 days, Disp: 28 tablet, Rfl: 0  No current facility-administered medications for this visit.    Physical Exam  Vitals and nursing note reviewed.   Constitutional:       General: He is not in acute distress.  HENT:      Head: Normocephalic.      Right Ear: External ear normal.      Left Ear: Ear canal and external ear normal.      Nose: Nose normal. No congestion or rhinorrhea.      Mouth/Throat:      Mouth: Mucous membranes " are moist.      Pharynx: Oropharynx is clear. No oropharyngeal exudate or posterior oropharyngeal erythema.   Eyes:      General: No scleral icterus.        Right eye: No discharge.         Left eye: No discharge.      Conjunctiva/sclera: Conjunctivae normal.      Pupils: Pupils are equal, round, and reactive to light.   Cardiovascular:      Rate and Rhythm: Normal rate and regular rhythm.      Pulses: Normal pulses.      Heart sounds: Normal heart sounds. No murmur heard.  Pulmonary:      Effort: Pulmonary effort is normal.      Breath sounds: Normal breath sounds. No wheezing, rhonchi or rales.   Abdominal:      General: Bowel sounds are normal. There is no distension.      Palpations: Abdomen is soft.      Tenderness: There is no abdominal tenderness.   Musculoskeletal:      Cervical back: Neck supple.      Right lower leg: No edema.      Left lower leg: No edema.   Skin:     General: Skin is warm and dry.      Findings: Lesion (1cm purulent lesion at right naris with surrounding erythema and edema) present. No rash.   Neurological:      General: No focal deficit present.      Mental Status: He is alert and oriented to person, place, and time.      Sensory: No sensory deficit.      Motor: No weakness.   Psychiatric:         Mood and Affect: Mood normal.         Behavior: Behavior normal.      Assessment     1. Folliculitis         Plan        Problem List Items Addressed This Visit          Derm    Folliculitis - Primary     1cm lesion at right naris now with purulent drainage and surrounding erythema and edema. Patient on Day 5 of Bactrim DS. I&D performed with wound culture obtained and wound packed with clean dressing applied. Patient received a Rocephin injection and will continue oral antibiotics. RTC in 2 days for follow-up.            Relevant Medications    cefTRIAXone injection 1 g (Completed)    Other Relevant Orders    Incision & Drainage    Culture, Wound       Follow up in about 2 days (around  5/26/2023).    Elvira Fair DO

## 2023-05-24 NOTE — ASSESSMENT & PLAN NOTE
1cm lesion at right naris now with purulent drainage and surrounding erythema and edema. Patient on Day 5 of Bactrim DS. I&D performed with wound culture obtained and wound packed with clean dressing applied. Patient received a Rocephin injection and will continue oral antibiotics. RTC in 2 days for follow-up.

## 2023-05-26 ENCOUNTER — OFFICE VISIT (OUTPATIENT)
Dept: FAMILY MEDICINE | Facility: CLINIC | Age: 53
End: 2023-05-26
Payer: MEDICARE

## 2023-05-26 VITALS
OXYGEN SATURATION: 97 % | DIASTOLIC BLOOD PRESSURE: 76 MMHG | TEMPERATURE: 98 F | RESPIRATION RATE: 20 BRPM | HEIGHT: 70 IN | SYSTOLIC BLOOD PRESSURE: 112 MMHG | BODY MASS INDEX: 29.49 KG/M2 | HEART RATE: 91 BPM | WEIGHT: 206 LBS

## 2023-05-26 DIAGNOSIS — L73.9 FOLLICULITIS: Primary | ICD-10-CM

## 2023-05-26 LAB — MICROORGANISM SPEC CULT: ABNORMAL

## 2023-05-26 PROCEDURE — 1159F PR MEDICATION LIST DOCUMENTED IN MEDICAL RECORD: ICD-10-PCS | Mod: ,,, | Performed by: FAMILY MEDICINE

## 2023-05-26 PROCEDURE — 3078F DIAST BP <80 MM HG: CPT | Mod: ,,, | Performed by: FAMILY MEDICINE

## 2023-05-26 PROCEDURE — 3008F BODY MASS INDEX DOCD: CPT | Mod: ,,, | Performed by: FAMILY MEDICINE

## 2023-05-26 PROCEDURE — 1160F RVW MEDS BY RX/DR IN RCRD: CPT | Mod: ,,, | Performed by: FAMILY MEDICINE

## 2023-05-26 PROCEDURE — 1160F PR REVIEW ALL MEDS BY PRESCRIBER/CLIN PHARMACIST DOCUMENTED: ICD-10-PCS | Mod: ,,, | Performed by: FAMILY MEDICINE

## 2023-05-26 PROCEDURE — 3060F POS MICROALBUMINURIA REV: CPT | Mod: ,,, | Performed by: FAMILY MEDICINE

## 2023-05-26 PROCEDURE — 3044F PR MOST RECENT HEMOGLOBIN A1C LEVEL <7.0%: ICD-10-PCS | Mod: ,,, | Performed by: FAMILY MEDICINE

## 2023-05-26 PROCEDURE — 3078F PR MOST RECENT DIASTOLIC BLOOD PRESSURE < 80 MM HG: ICD-10-PCS | Mod: ,,, | Performed by: FAMILY MEDICINE

## 2023-05-26 PROCEDURE — 3060F PR POS MICROALBUMINURIA RESULT DOCUMENTED/REVIEW: ICD-10-PCS | Mod: ,,, | Performed by: FAMILY MEDICINE

## 2023-05-26 PROCEDURE — 3044F HG A1C LEVEL LT 7.0%: CPT | Mod: ,,, | Performed by: FAMILY MEDICINE

## 2023-05-26 PROCEDURE — 3066F NEPHROPATHY DOC TX: CPT | Mod: ,,, | Performed by: FAMILY MEDICINE

## 2023-05-26 PROCEDURE — 1159F MED LIST DOCD IN RCRD: CPT | Mod: ,,, | Performed by: FAMILY MEDICINE

## 2023-05-26 PROCEDURE — 3008F PR BODY MASS INDEX (BMI) DOCUMENTED: ICD-10-PCS | Mod: ,,, | Performed by: FAMILY MEDICINE

## 2023-05-26 PROCEDURE — 3066F PR DOCUMENTATION OF TREATMENT FOR NEPHROPATHY: ICD-10-PCS | Mod: ,,, | Performed by: FAMILY MEDICINE

## 2023-05-26 PROCEDURE — 3074F SYST BP LT 130 MM HG: CPT | Mod: ,,, | Performed by: FAMILY MEDICINE

## 2023-05-26 PROCEDURE — 99024 POSTOP FOLLOW-UP VISIT: CPT | Mod: ,,, | Performed by: FAMILY MEDICINE

## 2023-05-26 PROCEDURE — 3074F PR MOST RECENT SYSTOLIC BLOOD PRESSURE < 130 MM HG: ICD-10-PCS | Mod: ,,, | Performed by: FAMILY MEDICINE

## 2023-05-26 PROCEDURE — 99024 PR POST-OP FOLLOW-UP VISIT: ICD-10-PCS | Mod: ,,, | Performed by: FAMILY MEDICINE

## 2023-05-26 RX ORDER — MUPIROCIN 20 MG/G
OINTMENT TOPICAL DAILY
Qty: 30 G | Refills: 0 | Status: SHIPPED | OUTPATIENT
Start: 2023-05-26 | End: 2023-10-18

## 2023-05-26 NOTE — PROGRESS NOTES
Elvira Fair [facility]                                                           [address]                                                       [place] [address]     Subjective     Name: Bruno Levy   YOB: 1970 (52 y.o.)  MRN: [id number]  Visit Date: 05/26/2023   Chief Complaint: Folliculitis (2 day follow up on folliculitis of face. )      HPI:  51 yo M who presents for follow up of facial lesion. Wound culture returned positive for MRSA. He reports medication compliance and is currently on Day 7 of Bactrim DS and received a Rocephin injection 2 days ago. Wound at right naris with decreased erythema and edema. Packing removed and wound irrigated with peroxide. A clean dressing applied with pt instructed to apply mupirocin ointment and clean dressing daily. He is to complete antibiotic therapy and referral placed to ENT for evaluation.       Review of Systems   Constitutional:  Negative for appetite change, chills, fatigue and fever.   HENT:  Negative for congestion, ear pain, hearing loss, sore throat and tinnitus.    Eyes:  Negative for pain and visual disturbance.   Respiratory:  Negative for cough, shortness of breath and wheezing.    Cardiovascular:  Negative for chest pain, palpitations and leg swelling.   Gastrointestinal:  Negative for abdominal pain, constipation, diarrhea, nausea and vomiting.   Endocrine: Negative for cold intolerance and heat intolerance.   Genitourinary:  Negative for dysuria, frequency and hematuria.   Musculoskeletal:  Negative for arthralgias, back pain, joint swelling, myalgias and neck pain.   Skin:  Positive for wound. Negative for rash.   Neurological:  Negative for tremors, weakness, light-headedness, numbness and headaches.   Hematological:  Negative for adenopathy.   Psychiatric/Behavioral:  Negative for confusion and sleep disturbance.       Objective     /76 (BP  "Location: Right arm, Patient Position: Sitting, BP Method: Large (Manual))   Pulse 91   Temp 98.3 °F (36.8 °C) (Oral)   Resp 20   Ht 5' 10" (1.778 m)   Wt 93.4 kg (206 lb)   SpO2 97%   BMI 29.56 kg/m²       Current Outpatient Medications:     amLODIPine (NORVASC) 5 MG tablet, Take 2 tablets (10 mg total) by mouth once daily., Disp: 90 tablet, Rfl: 1    atorvastatin (LIPITOR) 40 MG tablet, Take 1 tablet (40 mg total) by mouth once daily., Disp: 90 tablet, Rfl: 3    baclofen (LIORESAL) 20 MG tablet, Take 20 mg by mouth every 6 (six) hours as needed., Disp: , Rfl:     gabapentin (NEURONTIN) 600 MG tablet, 1.5 tablets every 6 hours, Disp: , Rfl:     metFORMIN (GLUCOPHAGE-XR) 500 MG ER 24hr tablet, TAKE 2 TABLETS TWICE DAILY WITH MEALS, Disp: 360 tablet, Rfl: 1    oxyCODONE-acetaminophen (PERCOCET) 7.5-325 mg per tablet, Take 1 tablet by mouth every 8 (eight) hours as needed., Disp: , Rfl:     sulfamethoxazole-trimethoprim 800-160mg (BACTRIM DS) 800-160 mg Tab, Take 1 tablet by mouth 2 (two) times daily. for 14 days, Disp: 28 tablet, Rfl: 0    mupirocin (BACTROBAN) 2 % ointment, Apply topically once daily., Disp: 30 g, Rfl: 0    Physical Exam  Vitals and nursing note reviewed.   Constitutional:       General: He is not in acute distress.  HENT:      Head: Normocephalic.      Right Ear: External ear normal.      Left Ear: Ear canal and external ear normal.      Nose: Nose normal. No congestion or rhinorrhea.      Mouth/Throat:      Mouth: Mucous membranes are moist.      Pharynx: Oropharynx is clear. No oropharyngeal exudate or posterior oropharyngeal erythema.   Eyes:      General: No scleral icterus.        Right eye: No discharge.         Left eye: No discharge.      Conjunctiva/sclera: Conjunctivae normal.      Pupils: Pupils are equal, round, and reactive to light.   Cardiovascular:      Rate and Rhythm: Normal rate and regular rhythm.      Pulses: Normal pulses.      Heart sounds: Normal heart sounds. No " murmur heard.  Pulmonary:      Effort: Pulmonary effort is normal.      Breath sounds: Normal breath sounds. No wheezing, rhonchi or rales.   Abdominal:      General: Bowel sounds are normal. There is no distension.      Palpations: Abdomen is soft.      Tenderness: There is no abdominal tenderness.   Musculoskeletal:      Cervical back: Neck supple.      Right lower leg: No edema.      Left lower leg: No edema.   Skin:     General: Skin is warm and dry.      Findings: Lesion (1cm lesion at right naris with improvement of surrounding erythema and edema) present. No rash.   Neurological:      General: No focal deficit present.      Mental Status: He is alert and oriented to person, place, and time.      Sensory: No sensory deficit.      Motor: No weakness.   Psychiatric:         Mood and Affect: Mood normal.         Behavior: Behavior normal.        All recently obtained labs have been reviewed and discussed in detail with the patient.   Assessment     1. Folliculitis         Plan        Problem List Items Addressed This Visit          Derm    Folliculitis - Primary     Wound exhibits signs of improvement with decreased erythema and edema. Wound culture positive for MRSA with patient to complete Bactrim DS. During encounter, packing removed and wound irrigated with peroxide. Clean bandage applied and pt referred to ENT. He is to apply mupirocin and change dressing daily.           Relevant Medications    mupirocin (BACTROBAN) 2 % ointment    Other Relevant Orders    Ambulatory referral/consult to ENT       Follow up in about 1 month (around 6/26/2023), or if symptoms worsen or fail to improve.    Elvira Fair DO

## 2023-05-26 NOTE — ASSESSMENT & PLAN NOTE
Wound exhibits signs of improvement with decreased erythema and edema. Wound culture positive for MRSA with patient to complete Bactrim DS. During encounter, packing removed and wound irrigated with peroxide. Clean bandage applied and pt referred to ENT. He is to apply mupirocin and change dressing daily.

## 2023-05-31 ENCOUNTER — CLINICAL SUPPORT (OUTPATIENT)
Dept: REHABILITATION | Facility: HOSPITAL | Age: 53
End: 2023-05-31
Payer: MEDICARE

## 2023-05-31 DIAGNOSIS — R53.1 DECREASED STRENGTH: ICD-10-CM

## 2023-05-31 DIAGNOSIS — R52 PAIN: ICD-10-CM

## 2023-05-31 DIAGNOSIS — M51.9 LUMBAR DISC DISEASE: Primary | ICD-10-CM

## 2023-05-31 DIAGNOSIS — G62.9 NEUROPATHY: ICD-10-CM

## 2023-05-31 PROCEDURE — 97162 PT EVAL MOD COMPLEX 30 MIN: CPT

## 2023-05-31 NOTE — PLAN OF CARE
[facility]  Physical Therapy Initial Evaluation / Discharge Summary    Date: 5/31/2023   Name: Bruno Levy  Clinic Number: [id number]    Therapy Diagnosis:   Encounter Diagnoses   Name Primary?    Neuropathy     Lumbar disc disease Yes    Pain     Decreased strength      Physician: Cam Sanon DO    Physician Orders: PT Eval and Treat   Medical Diagnosis from Referral: Low back pain   Evaluation Date: 5/31/2023  Authorization Period Expiration: 5/4/24  Plan of Care Expiration: 8/23/23  Visit # / Visits authorized: 1    Time In: 112  Time Out: 315  Total Appointment Time (timed & untimed codes): 63 minutes    Precautions: Standard and Diabetes    Subjective   Date of onset: Chronic    History of current condition - Bruno reports: History of disability and is currently needing the disability paperwork filled out by MD. 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 Then the diabetes became out of control. Had a lumbar fusion after the disability for the neuropathy. The back pain has become worse. Had a spinal stimulator put in 2014 for the neuropathy but then had muscle cramps/spasms in the ankles and feet. The nerves became 'fried'. Now the muscles from buttocks to toes bilaterally get cramped with bilateral calves being the worst. The stimulator was removed in 2018. The damage is irreversible. Currently the lumbar degeneration has worsened and now having nerve pain in the hips. 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 Can only wear crocs because of the uncomfortable feeling of prolonged pressure/material making the pain worse. Don't sleep well secondary to pain. Relief with standing or putting pressure on the feet and keep them on the ground - sometimes causing the patient to sleep sitting up leaning over a table just to keep the feet on the ground. Relief with medication but does not take intensity of pain away. 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 Patient is stable unless he closes his eyes. So washing hair would require him to lean on a wall to rinse the hair to not fall. Patient becomes extremely unstable with eyes closed and will fall.  Patient states he has a history of carpal tunnel and will occasionally have to wear the gloves to sleep in to keep the symptoms from worsening. Patient has difficulty holding onto small things and does tend to drop items secondary to the carpal tunnel. Had carpal tunnel release in 2012. Prolonged standing of 15 minutes causes the neuropathy to become unbearable. After 30 minutes of sitting causes the back to lock up. Walking is better than standing still but still will hurt with any prolonged position. Any activity such as yard work, house work, etc causes the patient to hurt for several days after the activity is completed. See MD in 3 weeks. No stairs at home but does not have trouble with ascending stairs of one flight but will have difficulty with descending stairs because of balance. Ladders are difficult because of lack of sensation of feet and have to look at foot placement. Patient states he will sometimes have full body spasms when lying in bed or cramp when trying to put on seatbelt.      Medical History:   Past Medical History:   Diagnosis Date    Depression     Diabetes mellitus, type 2     History of methicillin resistant staphylococcus aureus (MRSA)     Hyperlipidemia     Hypertension     Neuropathy     Osteomyelitis        Surgical History:   Bruno Levy  has a past surgical history that includes Toe amputation; Fracture surgery (1976); Nasal septum surgery; Spinal cord stimulator implant; Spinal cord stimulator removal; and Lumbar fusion.    Medications:   Bruno has a current medication list which includes the following prescription(s): amlodipine, atorvastatin, baclofen, gabapentin, metformin, mupirocin, oxycodone-acetaminophen, and  sulfamethoxazole-trimethoprim 800-160mg.    Allergies:   Review of patient's allergies indicates:   Allergen Reactions    Levofloxacin (bulk) Other (See Comments)     Worsened muscle spasms        Imaging, MRI studies, bone scan films: in Epic under imaging    Pain:  Current 10/10, worst 10/10,  Location: bilateral ankles, back , buttocks , feet , knee , lower legs, neck , trunk, and upper legs   Description: Aching, Dull, Burning, Throbbing, Grabbing, Tight, Tingling, Sharp, and Shooting  Aggravating Factors: Sitting, Standing, Bending, Walking, Night Time, Flexing, Lifting, and Getting out of bed/chair  Easing Factors: nothing        Objective     Bilateral single leg stance 1 second with loss of balance  Bilateral upper extremities light touch intact  Decreased light touch over right S1 dermatome and no sensation to the bilateral ankles from malleolus to end of toes  Manual muscle test bilateral lower extremities 5/5 causing the low back bilateral to feel pain   Manual muscle test bilateral upper extremities 5/5  Amputation 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 Shifts less to the right   Full cervical ROM with pain during right active side bend secondary to a disc bulge  Walked 1 gym lap at 100 metronome with antalgic gait but able to maintain safe gait speed  Walked 1 gym lap at 120 metronome with worsening of antalgic gait with limping toward the left and increased low back spasms/pain. Became short of breath  Sat for 1 hour in chair discussing subjective history and was shifting weight frequently after ~ 20 minutes  Standing task - 6 minutes. Patient needed to sit secondary to  "intense low back pain, tightness, tingling and pressure and the feet feel like they "are in an ant bed" and feels like a 'vice ' squeezing the feet.   A/Descend stairs - step over step but remained stable.           Limitation/Restriction for FOTO Survey    Therapist reviewed FOTO scores for Bruno Levy on 5/31/2023.   FOTO documents entered into [employer] - see Media section.    Intake Score: 47%       Home Exercises and Patient Education Provided    Education provided:   Francis Findings  - Patient educated on biomechanical justification for therapeutic exercise and importance of compliance with HEP in order to improve overall impairments and QOL   Patient was educated on all the above exercise prior/during/after for proper posture, positioning, and execution for safe performance with home exercise program.   Patient educated on the importance of improved core and upper and lower extremity strength in order to improve alignment of the spine and upper and lower extremities with static positions and dynamic movement.   Patient educated on the importance of strong core and lower extremity musculature in order to improve both static and dynamic balance, improve gait mechanics, reduce fall risk and improve household and community mobility.     Assessment   Bruno is a 52 y.o. male referred to outpatient Physical Therapy with a medical diagnosis of lumbar pain. Patient needs to renew disability and has paperwork to turn in. An FCE was not completed today but an evaluation was completed. Patient presents with poor balance/instability especially with a short amount of time with eyes closed. Patient has a long extensive history of deficits that has affected overall mobility, agility, endurance and function. Patient remains independent with all activities of daily living and does take care of his 20 year old daughter that requires constant supervision. Patient struggles with high pain level throughout the back and legs with " also having pain in the neck secondary to disc bulge. Patient suffers from cramping and spasms that can become debilitating.   Patient has limited positional timeframes - increased pain after ~ 5 minutes of standing/walking and ~ 25-30 minutes of sitting. All above listed balance testing was poor. Patient was given the opportunity to continue with physical therapy vs. This being a one time evaluation for disability purposes. Patient states he will visit MD and discuss at this point. Possibly eval only.       Patient prognosis is Guarded.     Patient will benefit from skilled outpatient Physical Therapy to address the deficits stated above and in the chart below, provide patient /family education, and to maximize patientt's level of independence.     Plan of care discussed with patient: Yes  Patient's spiritual, cultural and educational needs considered and patient is agreeable to the plan of care and goals as stated below:     Anticipated Barriers for therapy: chronic debilitating deficits     Goals:  Short Term Goals: 6 weeks   Patient will be able to sleep ~ 3 hours without waking from pain  Patient will maintain prolonged positions x 15 minutes with  6/10 pain    Long Term Goals: 12 weeks   Patient will be able to sleep through the night without waking from pain  Patient will improve standing balance eyes closed to 5 seconds   Patient will maintain prolonged positions x 30 minutes with  4 /10 pain    Plan   Plan of care Certification: 5/31/2023 to 8/23/23.    Outpatient Physical Therapy 2 times weekly for 12 weeks to include the following interventions: Aquatic Therapy, Hybresis with Dex, Cervical/Lumbar Traction, Electrical Stimulation IFC/Premod, Gait Training, Manual Therapy, Moist Heat/ Ice, Neuromuscular Re-ed, Patient Education, Self Care, Therapeutic Activities, Therapeutic Exercise, Ultrasound, and Dry Needling.     REMEBRTO TRACY, PT        I CERTIFY THE NEED FOR THESE SERVICES FURNISHED UNDER THIS  PLAN OF TREATMENT AND WHILE UNDER MY CARE.    Physician's comments:      Physician's Signature: ____________________________________________Date________________        Please fax this MD signed form to:  [facility]  [phone] fax       Patient did not return after session. Reason Unknown. Please consider this note a discharge from physical therapy. Thank you for this referral!  Preeti Garcia DPT, MTC'

## 2023-06-26 ENCOUNTER — OFFICE VISIT (OUTPATIENT)
Dept: OTOLARYNGOLOGY | Facility: CLINIC | Age: 53
End: 2023-06-26
Payer: MEDICARE

## 2023-06-26 VITALS — HEIGHT: 70 IN | WEIGHT: 206 LBS | BODY MASS INDEX: 29.49 KG/M2

## 2023-06-26 DIAGNOSIS — L73.9 FOLLICULITIS: ICD-10-CM

## 2023-06-26 DIAGNOSIS — J34.0 CELLULITIS OF EXTERNAL NOSE: Primary | ICD-10-CM

## 2023-06-26 PROCEDURE — 3044F PR MOST RECENT HEMOGLOBIN A1C LEVEL <7.0%: ICD-10-PCS | Mod: CPTII,,, | Performed by: OTOLARYNGOLOGY

## 2023-06-26 PROCEDURE — 3008F BODY MASS INDEX DOCD: CPT | Mod: CPTII,,, | Performed by: OTOLARYNGOLOGY

## 2023-06-26 PROCEDURE — 3060F PR POS MICROALBUMINURIA RESULT DOCUMENTED/REVIEW: ICD-10-PCS | Mod: CPTII,,, | Performed by: OTOLARYNGOLOGY

## 2023-06-26 PROCEDURE — 3044F HG A1C LEVEL LT 7.0%: CPT | Mod: CPTII,,, | Performed by: OTOLARYNGOLOGY

## 2023-06-26 PROCEDURE — 3066F NEPHROPATHY DOC TX: CPT | Mod: CPTII,,, | Performed by: OTOLARYNGOLOGY

## 2023-06-26 PROCEDURE — 99203 OFFICE O/P NEW LOW 30 MIN: CPT | Mod: S$PBB,,, | Performed by: OTOLARYNGOLOGY

## 2023-06-26 PROCEDURE — 3060F POS MICROALBUMINURIA REV: CPT | Mod: CPTII,,, | Performed by: OTOLARYNGOLOGY

## 2023-06-26 PROCEDURE — 3066F PR DOCUMENTATION OF TREATMENT FOR NEPHROPATHY: ICD-10-PCS | Mod: CPTII,,, | Performed by: OTOLARYNGOLOGY

## 2023-06-26 PROCEDURE — 3008F PR BODY MASS INDEX (BMI) DOCUMENTED: ICD-10-PCS | Mod: CPTII,,, | Performed by: OTOLARYNGOLOGY

## 2023-06-26 PROCEDURE — 1159F PR MEDICATION LIST DOCUMENTED IN MEDICAL RECORD: ICD-10-PCS | Mod: CPTII,,, | Performed by: OTOLARYNGOLOGY

## 2023-06-26 PROCEDURE — 1159F MED LIST DOCD IN RCRD: CPT | Mod: CPTII,,, | Performed by: OTOLARYNGOLOGY

## 2023-06-26 PROCEDURE — 99213 OFFICE O/P EST LOW 20 MIN: CPT | Mod: PBBFAC | Performed by: OTOLARYNGOLOGY

## 2023-06-26 PROCEDURE — 99203 PR OFFICE/OUTPT VISIT, NEW, LEVL III, 30-44 MIN: ICD-10-PCS | Mod: S$PBB,,, | Performed by: OTOLARYNGOLOGY

## 2023-06-26 NOTE — PROGRESS NOTES
Subjective:       Patient ID: Bruno Levy is a 53 y.o. male.    Chief Complaint: Other (Pt states he got stung in his right nostril about a month ago. )  No problems now   HPI  Review of Systems   HENT:  Positive for facial swelling.    All other systems reviewed and are negative.    Objective:      Physical Exam  General: NAD  Head: Normocephalic, atraumatic, no facial asymmetry/normal strength,  Ears: Both auricules normal in appearance, w/o deformities tympanic membranes normal external auditory canals normal  Nose: External nose w/o deformities normal turbinates no drainage or inflammation  Oral Cavity: Lips, gums, floor of mouth, tongue hard palate, and buccal mucosa without mass/lesion  Oropharynx: Mucosa pink and moist, soft palate, posterior pharynx and oropharyngeal wall without mass/lesion  Neck: Supple, symmetric, trachea midline, no palpable mass/lesion, no palpable cervical lymphadenopathy  Skin: Warm and dry, no concerning lesions  Respiratory: Respirations even, unlabored   Assessment:       1. Cellulitis of external nose    2. Folliculitis        Plan:       Normal appearing now no septal involvement

## 2023-06-28 ENCOUNTER — OFFICE VISIT (OUTPATIENT)
Dept: FAMILY MEDICINE | Facility: CLINIC | Age: 53
End: 2023-06-28
Payer: MEDICARE

## 2023-06-28 VITALS
BODY MASS INDEX: 30.01 KG/M2 | DIASTOLIC BLOOD PRESSURE: 80 MMHG | SYSTOLIC BLOOD PRESSURE: 120 MMHG | HEIGHT: 70 IN | TEMPERATURE: 98 F | RESPIRATION RATE: 18 BRPM | OXYGEN SATURATION: 96 % | HEART RATE: 83 BPM | WEIGHT: 209.63 LBS

## 2023-06-28 DIAGNOSIS — E78.2 MIXED HYPERLIPIDEMIA: ICD-10-CM

## 2023-06-28 DIAGNOSIS — F33.9 DEPRESSION, RECURRENT: ICD-10-CM

## 2023-06-28 DIAGNOSIS — I10 ESSENTIAL HYPERTENSION: Primary | ICD-10-CM

## 2023-06-28 DIAGNOSIS — G62.9 NEUROPATHY: ICD-10-CM

## 2023-06-28 DIAGNOSIS — E11.43 TYPE 2 DIABETES MELLITUS WITH DIABETIC AUTONOMIC NEUROPATHY, WITHOUT LONG-TERM CURRENT USE OF INSULIN: ICD-10-CM

## 2023-06-28 DIAGNOSIS — L73.9 FOLLICULITIS: ICD-10-CM

## 2023-06-28 DIAGNOSIS — M51.9 LUMBAR DISC DISEASE: ICD-10-CM

## 2023-06-28 PROCEDURE — 3079F DIAST BP 80-89 MM HG: CPT | Mod: ,,, | Performed by: FAMILY MEDICINE

## 2023-06-28 PROCEDURE — 1159F MED LIST DOCD IN RCRD: CPT | Mod: ,,, | Performed by: FAMILY MEDICINE

## 2023-06-28 PROCEDURE — 3079F PR MOST RECENT DIASTOLIC BLOOD PRESSURE 80-89 MM HG: ICD-10-PCS | Mod: ,,, | Performed by: FAMILY MEDICINE

## 2023-06-28 PROCEDURE — 3044F HG A1C LEVEL LT 7.0%: CPT | Mod: ,,, | Performed by: FAMILY MEDICINE

## 2023-06-28 PROCEDURE — 3060F PR POS MICROALBUMINURIA RESULT DOCUMENTED/REVIEW: ICD-10-PCS | Mod: ,,, | Performed by: FAMILY MEDICINE

## 2023-06-28 PROCEDURE — 3066F PR DOCUMENTATION OF TREATMENT FOR NEPHROPATHY: ICD-10-PCS | Mod: ,,, | Performed by: FAMILY MEDICINE

## 2023-06-28 PROCEDURE — 3060F POS MICROALBUMINURIA REV: CPT | Mod: ,,, | Performed by: FAMILY MEDICINE

## 2023-06-28 PROCEDURE — 99214 PR OFFICE/OUTPT VISIT, EST, LEVL IV, 30-39 MIN: ICD-10-PCS | Mod: ,,, | Performed by: FAMILY MEDICINE

## 2023-06-28 PROCEDURE — 1160F PR REVIEW ALL MEDS BY PRESCRIBER/CLIN PHARMACIST DOCUMENTED: ICD-10-PCS | Mod: ,,, | Performed by: FAMILY MEDICINE

## 2023-06-28 PROCEDURE — 3044F PR MOST RECENT HEMOGLOBIN A1C LEVEL <7.0%: ICD-10-PCS | Mod: ,,, | Performed by: FAMILY MEDICINE

## 2023-06-28 PROCEDURE — 1159F PR MEDICATION LIST DOCUMENTED IN MEDICAL RECORD: ICD-10-PCS | Mod: ,,, | Performed by: FAMILY MEDICINE

## 2023-06-28 PROCEDURE — 3008F PR BODY MASS INDEX (BMI) DOCUMENTED: ICD-10-PCS | Mod: ,,, | Performed by: FAMILY MEDICINE

## 2023-06-28 PROCEDURE — 3074F SYST BP LT 130 MM HG: CPT | Mod: ,,, | Performed by: FAMILY MEDICINE

## 2023-06-28 PROCEDURE — 3008F BODY MASS INDEX DOCD: CPT | Mod: ,,, | Performed by: FAMILY MEDICINE

## 2023-06-28 PROCEDURE — 99214 OFFICE O/P EST MOD 30 MIN: CPT | Mod: ,,, | Performed by: FAMILY MEDICINE

## 2023-06-28 PROCEDURE — 3074F PR MOST RECENT SYSTOLIC BLOOD PRESSURE < 130 MM HG: ICD-10-PCS | Mod: ,,, | Performed by: FAMILY MEDICINE

## 2023-06-28 PROCEDURE — 3066F NEPHROPATHY DOC TX: CPT | Mod: ,,, | Performed by: FAMILY MEDICINE

## 2023-06-28 PROCEDURE — 1160F RVW MEDS BY RX/DR IN RCRD: CPT | Mod: ,,, | Performed by: FAMILY MEDICINE

## 2023-06-28 NOTE — PROGRESS NOTES
Cam Sanon DO   [address]  [place]  [address]      PATIENT NAME: Bruno Levy  : 1970  DATE: 23  MRN: [id number]      Billing Provider: Cam Sanon DO  Level of Service:   Patient PCP Information       Provider PCP Type    Cam Sanon DO General            Reason for Visit / Chief Complaint: Follow-up (2 month follow up)       Update PCP  Update Chief Complaint         History of Present Illness / Problem Focused Workflow     Bruno Levy presents to the clinic with Follow-up (2 month follow up)     Patient is in for follow-up.  Has history of folliculitis was seen by ear nose and throat he is had that his folliculitis had resolved.  Has history of hypertension as well as diabetes and hyperlipidemia.  He has been taking his medicines as directed.  He states his blood sugars have been well controlled.  He denies any chest pain shortness is breath palpitations PND orthopnea.      Review of Systems     Review of Systems   Constitutional:  Negative for activity change, appetite change, chills, fatigue and fever.   HENT:  Negative for nasal congestion, ear discharge, ear pain, mouth dryness, mouth sores, postnasal drip, sinus pressure/congestion, sore throat and voice change.    Eyes:  Negative for pain, discharge, redness, itching and visual disturbance.   Respiratory:  Negative for apnea, cough, chest tightness, shortness of breath and wheezing.    Cardiovascular:  Negative for chest pain, palpitations and leg swelling.   Gastrointestinal:  Negative for abdominal distention, abdominal pain, anal bleeding, blood in stool, change in bowel habit, constipation, diarrhea, nausea, vomiting, reflux and change in bowel habit.   Endocrine: Negative for cold intolerance, heat intolerance, polydipsia, polyphagia and polyuria.   Genitourinary:  Negative for difficulty urinating, enuresis, erectile dysfunction, frequency, genital sores, hematuria and urgency.    Musculoskeletal:  Negative for arthralgias, back pain, gait problem, leg pain, myalgias and neck pain.   Integumentary:  Negative for rash, mole/lesion, breast mass and breast discharge.   Allergic/Immunologic: Negative for environmental allergies and food allergies.   Neurological:  Negative for dizziness, vertigo, tremors, seizures, syncope, facial asymmetry, speech difficulty, weakness, light-headedness, numbness, headaches, coordination difficulties, memory loss and coordination difficulties.   Hematological:  Negative for adenopathy. Does not bruise/bleed easily.   Psychiatric/Behavioral:  Negative for agitation, behavioral problems, confusion, decreased concentration, dysphoric mood, hallucinations, self-injury, sleep disturbance and suicidal ideas. The patient is not nervous/anxious and is not hyperactive.    Breast: Negative for mass    Medical / Social / Family History     Past Medical History:   Diagnosis Date    Depression     Diabetes mellitus, type 2     History of methicillin resistant staphylococcus aureus (MRSA)     Hyperlipidemia     Hypertension     Neuropathy     Osteomyelitis        Past Surgical History:   Procedure Laterality Date    FRACTURE SURGERY  1976    arm    LUMBAR FUSION      NASAL SEPTUM SURGERY      SPINAL CORD STIMULATOR IMPLANT      SPINAL CORD STIMULATOR REMOVAL      TOE AMPUTATION      rt foot great to and 5th toe;lt foot great,4th and 5th toes.       Social History    reports that he has been smoking vaping with nicotine. He has been exposed to tobacco smoke. He has never used smokeless tobacco. He reports that he does not currently use alcohol. He reports current drug use. Drug: Oxycodone.    Family History  's family history includes Bladder Cancer in his father; COPD in his mother; Hypertension in his brother; Lung cancer in his father.    Medications and Allergies     Medications  Outpatient Medications Marked as Taking for the 6/28/23 encounter (Office Visit) with  Cam Sanon, DO   Medication Sig Dispense Refill    amLODIPine (NORVASC) 5 MG tablet Take 2 tablets (10 mg total) by mouth once daily. 90 tablet 1    atorvastatin (LIPITOR) 40 MG tablet Take 1 tablet (40 mg total) by mouth once daily. 90 tablet 3    baclofen (LIORESAL) 20 MG tablet Take 20 mg by mouth every 6 (six) hours as needed.      gabapentin (NEURONTIN) 600 MG tablet 1.5 tablets every 6 hours      metFORMIN (GLUCOPHAGE-XR) 500 MG ER 24hr tablet TAKE 2 TABLETS TWICE DAILY WITH MEALS 360 tablet 1    oxyCODONE-acetaminophen (PERCOCET) 7.5-325 mg per tablet Take 1 tablet by mouth every 8 (eight) hours as needed.         Allergies  Review of patient's allergies indicates:   Allergen Reactions    Levofloxacin (bulk) Other (See Comments)     Worsened muscle spasms       Physical Examination     Vitals:    06/28/23 1312   BP: 120/80   Pulse: 83   Resp: 18   Temp: 98.4 °F (36.9 °C)     Physical Exam  Constitutional:       General: He is not in acute distress.     Appearance: Normal appearance. He is obese.   HENT:      Head: Normocephalic.      Nose: Nose normal.      Comments: Nose without any lesions no septal ulcerations noted no pustules noted.  No adenopathy.     Mouth/Throat:      Mouth: Mucous membranes are moist.      Pharynx: Oropharynx is clear. No oropharyngeal exudate or posterior oropharyngeal erythema.   Eyes:      General: No scleral icterus.        Right eye: No discharge.         Left eye: No discharge.      Conjunctiva/sclera: Conjunctivae normal.      Pupils: Pupils are equal, round, and reactive to light.   Neck:      Vascular: No carotid bruit.   Cardiovascular:      Rate and Rhythm: Normal rate and regular rhythm.      Pulses: Normal pulses.      Heart sounds: Normal heart sounds. No murmur heard.  Pulmonary:      Effort: Pulmonary effort is normal.      Breath sounds: Normal breath sounds. No wheezing.   Abdominal:      General: Abdomen is flat. Bowel sounds are normal.      Tenderness:  There is no abdominal tenderness.   Musculoskeletal:         General: Normal range of motion.      Cervical back: Normal range of motion.   Skin:     General: Skin is warm.      Capillary Refill: Capillary refill takes less than 2 seconds.      Findings: No rash.   Neurological:      General: No focal deficit present.      Mental Status: He is alert and oriented to person, place, and time.   Psychiatric:         Mood and Affect: Mood normal.         Behavior: Behavior normal.             Lab Results   Component Value Date    WBC 8.49 03/17/2023    HGB 14.5 03/17/2023    HCT 43.6 03/17/2023    MCV 94.6 03/17/2023     03/17/2023          Sodium   Date Value Ref Range Status   03/17/2023 137 136 - 145 mmol/L Final     Potassium   Date Value Ref Range Status   03/17/2023 4.0 3.5 - 5.1 mmol/L Final     Chloride   Date Value Ref Range Status   03/17/2023 101 98 - 107 mmol/L Final     CO2   Date Value Ref Range Status   03/17/2023 32 21 - 32 mmol/L Final     Glucose   Date Value Ref Range Status   03/17/2023 142 (H) 74 - 106 mg/dL Final     BUN   Date Value Ref Range Status   03/17/2023 15 7 - 18 mg/dL Final     Creatinine   Date Value Ref Range Status   03/17/2023 0.86 0.70 - 1.30 mg/dL Final     Calcium   Date Value Ref Range Status   03/17/2023 9.3 8.5 - 10.1 mg/dL Final     Total Protein   Date Value Ref Range Status   03/17/2023 7.5 6.4 - 8.2 g/dL Final     Albumin   Date Value Ref Range Status   03/17/2023 4.2 3.5 - 5.0 g/dL Final     Bilirubin, Total   Date Value Ref Range Status   03/17/2023 0.6 >0.0 - 1.2 mg/dL Final     Alk Phos   Date Value Ref Range Status   03/17/2023 76 45 - 115 U/L Final     AST   Date Value Ref Range Status   03/17/2023 36 15 - 37 U/L Final     ALT   Date Value Ref Range Status   03/17/2023 68 (H) 16 - 61 U/L Final     Anion Gap   Date Value Ref Range Status   03/17/2023 8 7 - 16 mmol/L Final     eGFR    Date Value Ref Range Status   04/26/2021 91       eGFR   Date  Value Ref Range Status   02/16/2022 100 >=60 mL/min/1.73m² Final      No image results found.     Procedures   Lab Results   Component Value Date    CHOL 183 03/17/2023    CHOL 108 02/16/2022    CHOL 179 08/02/2021     Lab Results   Component Value Date    HDL 36 (L) 03/17/2023    HDL 39 (L) 02/16/2022    HDL 24 (L) 08/02/2021     Lab Results   Component Value Date    LDLCALC 100 03/17/2023    LDLCALC 41 02/16/2022    LDLCALC  08/02/2021      Comment:      Unable to calculate due to one of the following values:  Cholesterol <5  HDL Cholesterol <5  Triglycerides <10 or >400     Lab Results   Component Value Date    TRIG 235 (H) 03/17/2023    TRIG 139 02/16/2022    TRIG 423 (H) 08/02/2021     Lab Results   Component Value Date    CHOLHDL 5.1 03/17/2023    CHOLHDL 2.8 02/16/2022    CHOLHDL 7.5 08/02/2021      Lab Results   Component Value Date    HGBA1C 6.4 03/17/2023      Lab Results   Component Value Date    TSH 1.600 03/17/2023    P8MOPVC 10.1 03/17/2023      Assessment and Plan (including Health Maintenance)      Problem List  Smart Sets  Document Outside HM   :    Plan:         Health Maintenance Due   Topic Date Due    PROSTATE-SPECIFIC ANTIGEN  Never done    TETANUS VACCINE  Never done    Colorectal Cancer Screening  Never done    Pneumococcal Vaccines (Age 0-64) (2 - PCV) 08/28/2016    Eye Exam  01/26/2017    Shingles Vaccine (1 of 2) Never done       Problem List Items Addressed This Visit          Neuro    Lumbar disc disease    Neuropathy    Current Assessment & Plan     Neuropathy currently stable.  Will continue him on gabapentin.  Will also control his diabetes which may be contributing to his neuropathy.  Follow-up every 6 months.            Psychiatric    Depression, recurrent    Current Assessment & Plan     Patient is currently stable patient does not want any treatment at this time.  Recommend that we use Celexa once daily            Derm    Folliculitis    Current Assessment & Plan     Resolved.   Patient is seen the ENT and there is no evidence of recurrent folliculitis.              Cardiac/Vascular    Essential hypertension - Primary    Current Assessment & Plan     Hypertension well controlled no change in medication.  Follow-up in 3 months.  Will check CBC and CMP yearly.  Goal is blood pressure 120/70         Hyperlipidemia    Current Assessment & Plan     Last cholesterol was 183 and LDL was 100 in March of 2023.  Continue atorvastatin 40 daily.  Recheck level            Endocrine    Type 2 diabetes mellitus with neurologic complication, without long-term current use of insulin    Current Assessment & Plan     Last A1c was 6.4 in March of 2023.  Continue metformin 500 once daily.  Discussed diet.  Exercise 5 days per week.  Follow-up every 3 months.  Hemoglobin A1c lipid and BMP at next visit.            Health Maintenance Topics with due status: Not Due       Topic Last Completion Date    Diabetes Urine Screening 03/17/2023    Foot Exam 03/17/2023    Lipid Panel 03/17/2023    Hemoglobin A1c 03/17/2023    Low Dose Statin 06/28/2023       No future appointments.     Follow up in about 3 months (around 9/28/2023).     Signature:  DO [facility]  [id number] [place] [address], [place]  [address]    Date of encounter: 6/28/23

## 2023-06-28 NOTE — ASSESSMENT & PLAN NOTE
Last A1c was 6.4 in March of 2023.  Continue metformin 500 once daily.  Discussed diet.  Exercise 5 days per week.  Follow-up every 3 months.  Hemoglobin A1c lipid and BMP at next visit.

## 2023-06-28 NOTE — ASSESSMENT & PLAN NOTE
Last cholesterol was 183 and LDL was 100 in March of 2023.  Continue atorvastatin 40 daily.  Recheck level

## 2023-06-28 NOTE — ASSESSMENT & PLAN NOTE
Neuropathy currently stable.  Will continue him on gabapentin.  Will also control his diabetes which may be contributing to his neuropathy.  Follow-up every 6 months.

## 2023-06-28 NOTE — ASSESSMENT & PLAN NOTE
Patient is currently stable patient does not want any treatment at this time.  Recommend that we use Celexa once daily

## 2023-07-12 DIAGNOSIS — E11.43 TYPE 2 DIABETES MELLITUS WITH DIABETIC AUTONOMIC NEUROPATHY, WITHOUT LONG-TERM CURRENT USE OF INSULIN: ICD-10-CM

## 2023-07-12 DIAGNOSIS — I10 ESSENTIAL HYPERTENSION: ICD-10-CM

## 2023-07-13 RX ORDER — METFORMIN HYDROCHLORIDE 500 MG/1
TABLET, EXTENDED RELEASE ORAL
Qty: 360 TABLET | Refills: 1 | Status: SHIPPED | OUTPATIENT
Start: 2023-07-13

## 2023-07-13 RX ORDER — AMLODIPINE BESYLATE 5 MG/1
10 TABLET ORAL DAILY
Qty: 180 TABLET | Refills: 1 | Status: SHIPPED | OUTPATIENT
Start: 2023-07-13 | End: 2024-01-18 | Stop reason: SDUPTHER

## 2023-07-20 ENCOUNTER — PATIENT MESSAGE (OUTPATIENT)
Dept: ADMINISTRATIVE | Facility: HOSPITAL | Age: 53
End: 2023-07-20

## 2023-08-04 ENCOUNTER — OFFICE VISIT (OUTPATIENT)
Dept: FAMILY MEDICINE | Facility: CLINIC | Age: 53
End: 2023-08-04
Payer: MEDICARE

## 2023-08-04 VITALS
BODY MASS INDEX: 29.78 KG/M2 | WEIGHT: 208 LBS | SYSTOLIC BLOOD PRESSURE: 118 MMHG | RESPIRATION RATE: 19 BRPM | OXYGEN SATURATION: 98 % | HEIGHT: 70 IN | DIASTOLIC BLOOD PRESSURE: 70 MMHG | HEART RATE: 71 BPM | TEMPERATURE: 98 F

## 2023-08-04 DIAGNOSIS — S61.431A PUNCTURE WOUND OF RIGHT HAND WITHOUT FOREIGN BODY, INITIAL ENCOUNTER: Primary | ICD-10-CM

## 2023-08-04 PROCEDURE — 3060F PR POS MICROALBUMINURIA RESULT DOCUMENTED/REVIEW: ICD-10-PCS | Mod: ,,,

## 2023-08-04 PROCEDURE — 1159F MED LIST DOCD IN RCRD: CPT | Mod: ,,,

## 2023-08-04 PROCEDURE — 3078F DIAST BP <80 MM HG: CPT | Mod: ,,,

## 2023-08-04 PROCEDURE — 99213 PR OFFICE/OUTPT VISIT, EST, LEVL III, 20-29 MIN: ICD-10-PCS | Mod: 25,,,

## 2023-08-04 PROCEDURE — 3044F PR MOST RECENT HEMOGLOBIN A1C LEVEL <7.0%: ICD-10-PCS | Mod: ,,,

## 2023-08-04 PROCEDURE — 1160F RVW MEDS BY RX/DR IN RCRD: CPT | Mod: ,,,

## 2023-08-04 PROCEDURE — 90715 TDAP VACCINE GREATER THAN OR EQUAL TO 7YO IM: ICD-10-PCS | Mod: ,,,

## 2023-08-04 PROCEDURE — 3074F SYST BP LT 130 MM HG: CPT | Mod: ,,,

## 2023-08-04 PROCEDURE — 3060F POS MICROALBUMINURIA REV: CPT | Mod: ,,,

## 2023-08-04 PROCEDURE — 3074F PR MOST RECENT SYSTOLIC BLOOD PRESSURE < 130 MM HG: ICD-10-PCS | Mod: ,,,

## 2023-08-04 PROCEDURE — 3044F HG A1C LEVEL LT 7.0%: CPT | Mod: ,,,

## 2023-08-04 PROCEDURE — 90471 TDAP VACCINE GREATER THAN OR EQUAL TO 7YO IM: ICD-10-PCS | Mod: ,,,

## 2023-08-04 PROCEDURE — 1159F PR MEDICATION LIST DOCUMENTED IN MEDICAL RECORD: ICD-10-PCS | Mod: ,,,

## 2023-08-04 PROCEDURE — 3066F NEPHROPATHY DOC TX: CPT | Mod: ,,,

## 2023-08-04 PROCEDURE — 3066F PR DOCUMENTATION OF TREATMENT FOR NEPHROPATHY: ICD-10-PCS | Mod: ,,,

## 2023-08-04 PROCEDURE — 90471 IMMUNIZATION ADMIN: CPT | Mod: ,,,

## 2023-08-04 PROCEDURE — 1160F PR REVIEW ALL MEDS BY PRESCRIBER/CLIN PHARMACIST DOCUMENTED: ICD-10-PCS | Mod: ,,,

## 2023-08-04 PROCEDURE — 3008F BODY MASS INDEX DOCD: CPT | Mod: ,,,

## 2023-08-04 PROCEDURE — 3078F PR MOST RECENT DIASTOLIC BLOOD PRESSURE < 80 MM HG: ICD-10-PCS | Mod: ,,,

## 2023-08-04 PROCEDURE — 90715 TDAP VACCINE 7 YRS/> IM: CPT | Mod: ,,,

## 2023-08-04 PROCEDURE — 3008F PR BODY MASS INDEX (BMI) DOCUMENTED: ICD-10-PCS | Mod: ,,,

## 2023-08-04 PROCEDURE — 99213 OFFICE O/P EST LOW 20 MIN: CPT | Mod: 25,,,

## 2023-08-04 NOTE — PROGRESS NOTES
HAYLEY RIOS   [place] [id number]  [place]  [address]      PATIENT NAME: Bruno Levy  : 1970  DATE: 23  MRN: [id number]      Billing Provider: HAYLEY RIOS  Level of Service: NM OFFICE/OUTPT VISIT, EST, LEVL III, 20-29 MIN  Patient PCP Information       Provider PCP Type    Cam Sanon DO General            Reason for Visit / Chief Complaint: Puncture Wound (Small puncture wound to palm of right hand. Pt states that he slipped while moving a board and his hand fell onto a metal nail sticking out of the board. )         History of Present Illness / Problem Focused Workflow     Bruno Levy presents to the clinic with Puncture Wound (Small puncture wound to palm of right hand. Pt states that he slipped while moving a board and his hand fell onto a metal nail sticking out of the board. )     52 y/o male presents to clinic with complaints of small  puncture wound to palm of right hand. Pt states that he slipped while moving a board and his hand fell onto a metal nail sticking out of the board. He does not remember his last tetanus vaccine.         Review of Systems     @Review of Systems   Constitutional:  Negative for chills and fever.   Respiratory:  Negative for cough, chest tightness, shortness of breath and wheezing.    Cardiovascular:  Negative for chest pain and palpitations.   Gastrointestinal:  Negative for abdominal pain, nausea and vomiting.   Integumentary:  Positive for wound.   Neurological:  Negative for dizziness, seizures, weakness, numbness and headaches.   Psychiatric/Behavioral:  Negative for suicidal ideas.        Medical / Social / Family History     Past Medical History:   Diagnosis Date    Depression     Diabetes mellitus, type 2     History of methicillin resistant staphylococcus aureus (MRSA)     Hyperlipidemia     Hypertension     Neuropathy     Osteomyelitis        Past Surgical History:   Procedure Laterality Date    FRACTURE  SURGERY  1976    arm    LUMBAR FUSION      NASAL SEPTUM SURGERY      SPINAL CORD STIMULATOR IMPLANT      SPINAL CORD STIMULATOR REMOVAL      TOE AMPUTATION      rt foot great to and 5th toe;lt foot great,4th and 5th toes.       Social History    reports that he has been smoking vaping with nicotine. He has been exposed to tobacco smoke. He has never used smokeless tobacco. He reports that he does not currently use alcohol. He reports current drug use. Drug: Oxycodone.    Family History  's family history includes Bladder Cancer in his father; COPD in his mother; Hypertension in his brother; Lung cancer in his father.    Medications and Allergies     Medications  Outpatient Medications Marked as Taking for the 8/4/23 encounter (Office Visit) with Franklyn Acosta FNP   Medication Sig Dispense Refill    amLODIPine (NORVASC) 5 MG tablet Take 2 tablets (10 mg total) by mouth once daily. 180 tablet 1    atorvastatin (LIPITOR) 40 MG tablet Take 1 tablet (40 mg total) by mouth once daily. 90 tablet 3    baclofen (LIORESAL) 20 MG tablet Take 20 mg by mouth every 6 (six) hours as needed.      gabapentin (NEURONTIN) 600 MG tablet 1.5 tablets every 6 hours      metFORMIN (GLUCOPHAGE-XR) 500 MG ER 24hr tablet TAKE 2 TABLETS TWICE DAILY WITH MEALS (Patient taking differently: 500 mg 2 (two) times daily with meals. TAKE 2 TABLETS TWICE DAILY WITH MEALS) 360 tablet 1    oxyCODONE-acetaminophen (PERCOCET) 7.5-325 mg per tablet Take 1 tablet by mouth every 8 (eight) hours as needed.         Allergies  Review of patient's allergies indicates:   Allergen Reactions    Levofloxacin (bulk) Other (See Comments)     Worsened muscle spasms       Physical Examination     Vitals:    08/04/23 1647   BP: 118/70   Pulse: 71   Resp: 19   Temp: 98.2 °F (36.8 °C)     Physical Exam  Vitals and nursing note reviewed.   Constitutional:       General: He is not in acute distress.     Appearance: Normal appearance.   HENT:      Head:  Normocephalic.   Eyes:      Pupils: Pupils are equal, round, and reactive to light.   Cardiovascular:      Rate and Rhythm: Normal rate and regular rhythm.      Heart sounds: Normal heart sounds, S1 normal and S2 normal. No murmur heard.     No friction rub. No gallop.   Pulmonary:      Effort: Pulmonary effort is normal. No respiratory distress.      Breath sounds: No decreased breath sounds, wheezing, rhonchi or rales.   Musculoskeletal:         General: No swelling or tenderness. Normal range of motion.      Cervical back: Normal range of motion.   Skin:     General: Skin is warm and dry.      Capillary Refill: Capillary refill takes less than 2 seconds.      Findings: Wound present.      Comments: Small puncture wound approx size of pencil tip noted to right palm. No edema, drainage,erythema noted.    Neurological:      Mental Status: He is alert and oriented to person, place, and time.   Psychiatric:         Attention and Perception: Attention normal.         Mood and Affect: Mood normal.         Behavior: Behavior normal. Behavior is cooperative.               Lab Results   Component Value Date    WBC 11.23 (H) 08/07/2023    HGB 14.9 08/07/2023    HCT 44.0 08/07/2023    MCV 94.4 08/07/2023     08/07/2023        CMP  Sodium   Date Value Ref Range Status   08/07/2023 142 136 - 145 mmol/L Final   02/03/2020 140 136 - 145 mmol/L Final     Potassium   Date Value Ref Range Status   08/07/2023 3.5 3.5 - 5.1 mmol/L Final   02/03/2020 4.3 3.5 - 5.1 mmol/L Final     Chloride   Date Value Ref Range Status   08/07/2023 100 98 - 107 mmol/L Final   02/03/2020 105 100 - 109 mmol/L Final     CO2   Date Value Ref Range Status   08/07/2023 33 (H) 21 - 32 mmol/L Final     Carbon Dioxide   Date Value Ref Range Status   02/03/2020 24 22 - 33 mmol/L Final     Glucose   Date Value Ref Range Status   08/07/2023 241 (H) 74 - 106 mg/dL Final     BUN   Date Value Ref Range Status   08/07/2023 9 7 - 18 mg/dL Final     Blood Urea  Nitrogen   Date Value Ref Range Status   02/03/2020 14 5 - 25 mg/dL Final     Creatinine   Date Value Ref Range Status   08/07/2023 0.89 0.70 - 1.30 mg/dL Final   02/03/2020 0.68 0.57 - 1.25 mg/dL Final     Calcium   Date Value Ref Range Status   08/07/2023 9.0 8.5 - 10.1 mg/dL Final   02/03/2020 8.8 8.8 - 10.6 mg/dL Final     Total Protein   Date Value Ref Range Status   08/07/2023 7.7 6.4 - 8.2 g/dL Final     Albumin   Date Value Ref Range Status   08/07/2023 3.8 3.5 - 5.0 g/dL Final     Albumin Level   Date Value Ref Range Status   02/03/2020 3.2 (L) 3.5 - 5.0 g/dl Final     Bilirubin, Total   Date Value Ref Range Status   08/07/2023 0.7 >0.0 - 1.2 mg/dL Final     Alk Phos   Date Value Ref Range Status   08/07/2023 89 45 - 115 U/L Final     AST   Date Value Ref Range Status   08/07/2023 17 15 - 37 U/L Final     ALT   Date Value Ref Range Status   08/07/2023 35 16 - 61 U/L Final     Anion Gap   Date Value Ref Range Status   08/07/2023 13 7 - 16 mmol/L Final   02/03/2020 11 8 - 16 mmol/L Final     eGFR   Date Value Ref Range Status   08/07/2023 102 >=60 mL/min/1.73m2 Final     Procedures   Assessment and Plan (including Health Maintenance)   :    Plan:           Problem List Items Addressed This Visit          Orthopedic    Puncture wound of right hand without foreign body - Primary    Current Assessment & Plan     Dtap vaccine today. Wound cleaned, antibiotic ointment applied and area dressed. Mupirocin ointment to area. Pt states he has RX at home. RTC with worsening, new or persistent symptoms.          Relevant Orders    (In Office Administered) Tdap Vaccine (Completed)       Health Maintenance Topics with due status: Not Due       Topic Last Completion Date    Influenza Vaccine 12/19/2022    Diabetes Urine Screening 03/17/2023    Foot Exam 03/17/2023    Lipid Panel 03/17/2023    Hemoglobin A1c 03/17/2023    TETANUS VACCINE 08/04/2023    Low Dose Statin 08/04/2023       No future appointments.     Health  Maintenance Due   Topic Date Due    PROSTATE-SPECIFIC ANTIGEN  Never done    Colorectal Cancer Screening  Never done    Pneumococcal Vaccines (Age 0-64) (2 - PCV) 08/28/2016    Eye Exam  01/26/2017    Shingles Vaccine (1 of 2) Never done        Follow up in about 3 days (around 8/7/2023).     Signature:  HAYLEY RIOS  [address] [place]  [address]    Date of encounter: 8/4/23

## 2023-08-07 ENCOUNTER — HOSPITAL ENCOUNTER (EMERGENCY)
Facility: HOSPITAL | Age: 53
Discharge: HOME | End: 2023-08-07
Attending: FAMILY MEDICINE
Payer: MEDICARE

## 2023-08-07 VITALS
HEART RATE: 93 BPM | RESPIRATION RATE: 20 BRPM | TEMPERATURE: 98 F | DIASTOLIC BLOOD PRESSURE: 98 MMHG | WEIGHT: 205 LBS | BODY MASS INDEX: 29.35 KG/M2 | OXYGEN SATURATION: 97 % | HEIGHT: 70 IN | SYSTOLIC BLOOD PRESSURE: 151 MMHG

## 2023-08-07 DIAGNOSIS — T14.8XXA WOUND INFECTION: Primary | ICD-10-CM

## 2023-08-07 DIAGNOSIS — L08.9 WOUND INFECTION: Primary | ICD-10-CM

## 2023-08-07 LAB
ALBUMIN SERPL BCP-MCNC: 3.8 G/DL (ref 3.5–5)
ALBUMIN/GLOB SERPL: 1 {RATIO}
ALP SERPL-CCNC: 89 U/L (ref 45–115)
ALT SERPL W P-5'-P-CCNC: 35 U/L (ref 16–61)
ANION GAP SERPL CALCULATED.3IONS-SCNC: 13 MMOL/L (ref 7–16)
AST SERPL W P-5'-P-CCNC: 17 U/L (ref 15–37)
BASOPHILS # BLD AUTO: 0.07 K/UL (ref 0–0.2)
BASOPHILS NFR BLD AUTO: 0.6 % (ref 0–1)
BILIRUB SERPL-MCNC: 0.7 MG/DL (ref ?–1.2)
BUN SERPL-MCNC: 9 MG/DL (ref 7–18)
BUN/CREAT SERPL: 10 (ref 6–20)
CALCIUM SERPL-MCNC: 9 MG/DL (ref 8.5–10.1)
CHLORIDE SERPL-SCNC: 100 MMOL/L (ref 98–107)
CO2 SERPL-SCNC: 33 MMOL/L (ref 21–32)
CREAT SERPL-MCNC: 0.89 MG/DL (ref 0.7–1.3)
DIFFERENTIAL METHOD BLD: ABNORMAL
EGFR (NO RACE VARIABLE) (RUSH/TITUS): 102 ML/MIN/1.73M2
EOSINOPHIL # BLD AUTO: 0.3 K/UL (ref 0–0.5)
EOSINOPHIL NFR BLD AUTO: 2.7 % (ref 1–4)
ERYTHROCYTE [DISTWIDTH] IN BLOOD BY AUTOMATED COUNT: 12.9 % (ref 11.5–14.5)
GLOBULIN SER-MCNC: 3.9 G/DL (ref 2–4)
GLUCOSE SERPL-MCNC: 241 MG/DL (ref 74–106)
HCT VFR BLD AUTO: 44 % (ref 40–54)
HGB BLD-MCNC: 14.9 G/DL (ref 13.5–18)
LYMPHOCYTES # BLD AUTO: 2.43 K/UL (ref 1–4.8)
LYMPHOCYTES NFR BLD AUTO: 21.6 % (ref 27–41)
MCH RBC QN AUTO: 32 PG (ref 27–31)
MCHC RBC AUTO-ENTMCNC: 33.9 G/DL (ref 32–36)
MCV RBC AUTO: 94.4 FL (ref 80–96)
MONOCYTES # BLD AUTO: 0.79 K/UL (ref 0–0.8)
MONOCYTES NFR BLD AUTO: 7 % (ref 2–6)
MPC BLD CALC-MCNC: 11.3 FL (ref 9.4–12.4)
NEUTROPHILS # BLD AUTO: 7.64 K/UL (ref 1.8–7.7)
NEUTROPHILS NFR BLD AUTO: 68.1 % (ref 53–65)
PLATELET # BLD AUTO: 206 K/UL (ref 150–400)
POTASSIUM SERPL-SCNC: 3.5 MMOL/L (ref 3.5–5.1)
PROT SERPL-MCNC: 7.7 G/DL (ref 6.4–8.2)
RBC # BLD AUTO: 4.66 M/UL (ref 4.6–6.2)
SODIUM SERPL-SCNC: 142 MMOL/L (ref 136–145)
WBC # BLD AUTO: 11.23 K/UL (ref 4.5–11)

## 2023-08-07 PROCEDURE — 99284 EMERGENCY DEPT VISIT MOD MDM: CPT | Mod: 25 | Performed by: FAMILY MEDICINE

## 2023-08-07 PROCEDURE — 80053 COMPREHEN METABOLIC PANEL: CPT | Performed by: FAMILY MEDICINE

## 2023-08-07 PROCEDURE — 96365 THER/PROPH/DIAG IV INF INIT: CPT | Mod: 59

## 2023-08-07 PROCEDURE — 63600175 PHARM REV CODE 636 W HCPCS: Performed by: FAMILY MEDICINE

## 2023-08-07 PROCEDURE — 87070 CULTURE OTHR SPECIMN AEROBIC: CPT | Performed by: FAMILY MEDICINE

## 2023-08-07 PROCEDURE — 10060 I&D ABSCESS SIMPLE/SINGLE: CPT

## 2023-08-07 PROCEDURE — 10060 I&D ABSCESS SIMPLE/SINGLE: CPT | Performed by: FAMILY MEDICINE

## 2023-08-07 PROCEDURE — 99284 EMERGENCY DEPT VISIT MOD MDM: CPT | Mod: 25

## 2023-08-07 PROCEDURE — 85025 COMPLETE CBC W/AUTO DIFF WBC: CPT | Performed by: FAMILY MEDICINE

## 2023-08-07 RX ORDER — SULFAMETHOXAZOLE AND TRIMETHOPRIM 800; 160 MG/1; MG/1
1 TABLET ORAL 2 TIMES DAILY
Qty: 14 TABLET | Refills: 0 | Status: SHIPPED | OUTPATIENT
Start: 2023-08-07 | End: 2023-08-14

## 2023-08-07 RX ORDER — CLINDAMYCIN PHOSPHATE 600 MG/50ML
600 INJECTION, SOLUTION INTRAVENOUS
Status: COMPLETED | OUTPATIENT
Start: 2023-08-07 | End: 2023-08-07

## 2023-08-07 RX ADMIN — CLINDAMYCIN IN 5 PERCENT DEXTROSE 600 MG: 12 INJECTION, SOLUTION INTRAVENOUS at 02:08

## 2023-08-07 NOTE — ED PROVIDER NOTES
Encounter Date: 8/7/2023       History     Chief Complaint   Patient presents with    Wound Infection     Hit hand on shaye nail. Has hx of neuropathy. Has had multiple toes amputated. Hx of dm.     The history is provided by the patient.     Review of patient's allergies indicates:   Allergen Reactions    Levofloxacin (bulk) Other (See Comments)     Worsened muscle spasms     Past Medical History:   Diagnosis Date    Depression     Diabetes mellitus, type 2     History of methicillin resistant staphylococcus aureus (MRSA)     Hyperlipidemia     Hypertension     Neuropathy     Osteomyelitis      Past Surgical History:   Procedure Laterality Date    FRACTURE SURGERY  1976    arm    LUMBAR FUSION      NASAL SEPTUM SURGERY      SPINAL CORD STIMULATOR IMPLANT      SPINAL CORD STIMULATOR REMOVAL      TOE AMPUTATION      rt foot great to and 5th toe;lt foot great,4th and 5th toes.     Family History   Problem Relation Age of Onset    COPD Mother     Lung cancer Father     Bladder Cancer Father     Hypertension Brother      Social History     Tobacco Use    Smoking status: Some Days     Current packs/day: 0.00     Types: Vaping with nicotine     Passive exposure: Past    Smokeless tobacco: Never   Substance Use Topics    Alcohol use: Not Currently    Drug use: Yes     Types: Oxycodone     Comment: Prescription     Review of Systems   Skin:  Positive for wound.       Physical Exam     Initial Vitals [08/07/23 1157]   BP Pulse Resp Temp SpO2   (!) 151/98 93 20 98 °F (36.7 °C) 97 %      MAP       --         Physical Exam    Constitutional: He appears well-developed and well-nourished.   HENT:   Head: Normocephalic and atraumatic.   Eyes: EOM are normal. Pupils are equal, round, and reactive to light.   Neck: Neck supple.   Normal range of motion.  Cardiovascular:  Normal rate and regular rhythm.           Abdominal: Abdomen is soft.   Musculoskeletal:         General: Normal range of motion.      Cervical back: Normal range  of motion and neck supple.     Neurological: He is alert and oriented to person, place, and time.   Skin:   Draining from right palm, redness to palm and forearm   Psychiatric: He has a normal mood and affect. Thought content normal.         Medical Screening Exam   See Full Note    ED Course   I & D - Incision and Drainage    Date/Time: 8/7/2023 2:17 PM  Location procedure was performed: [facility] EMERGENCY DEPARTMENT    Performed by: Neeta Baez MD  Authorized by: Neeta Baez MD  Pre-operative diagnosis: abscess  Post-operative diagnosis: abscess  Consent Done: Yes  Consent given by: patient  Type: abscess  Body area: upper extremity (right palm)  Anesthesia: local infiltration    Anesthesia:  Local Anesthetic: lidocaine 1% without epinephrine  Description of findings: pus draining   Scalpel size: 15  Incision type: single straight  Incision depth: dermal  Complexity: simple  Drainage: serosanguinous  Drainage amount: scant  Wound treatment: incision  Technical procedures used: incision and drainage  Patient tolerance: Patient tolerated the procedure well with no immediate complications    Incision depth: dermal        Labs Reviewed   COMPREHENSIVE METABOLIC PANEL - Abnormal; Notable for the following components:       Result Value    CO2 33 (*)     Glucose 241 (*)     All other components within normal limits   CBC WITH DIFFERENTIAL - Abnormal; Notable for the following components:    WBC 11.23 (*)     MCH 32.0 (*)     Neutrophils % 68.1 (*)     Lymphocytes % 21.6 (*)     Monocytes % 7.0 (*)     All other components within normal limits   CULTURE, WOUND   CBC W/ AUTO DIFFERENTIAL    Narrative:     The following orders were created for panel order CBC auto differential.  Procedure                               Abnormality         Status                     ---------                               -----------         ------                     CBC with Differential[[id number]]        Abnormal            Final  result                 Please view results for these tests on the individual orders.          Imaging Results    None          Medications   clindamycin in D5W 600 mg/50 mL IVPB 600 mg (600 mg Intravenous New Bag 8/7/23 1400)     Medical Decision Making:   ED Management:  Labs drawn  Incision and drainage  Clinda iv given  D/c home with oral abx                         Clinical Impression:   Final diagnoses:  [T14.8XXA, L08.9] Wound infection (Primary)        ED Disposition Condition    Discharge Stable          ED Prescriptions       Medication Sig Dispense Start Date End Date Auth. Provider    sulfamethoxazole-trimethoprim 800-160mg (BACTRIM DS) 800-160 mg Tab Take 1 tablet by mouth 2 (two) times daily. for 7 days 14 tablet 8/7/2023 8/14/2023 Neeta Baez MD          Follow-up Information       Follow up With Specialties Details Why Contact Info    Cam Sanon, DO Family Medicine   [address]  [facility] [place] [address]  [phone]               Neeta Baez MD  08/07/23 5489

## 2023-08-07 NOTE — ED TRIAGE NOTES
Patient arrived to the ER c/o of pain to the right hand. Patient stated he get a rusted nail in his hand Friday afternoon. He went to the clinic where they cleaned out the wound and gave Tetanus shot. Patient returned today due to green drainage coming from site and redness going down the hand into the arm.

## 2023-08-08 ENCOUNTER — TELEPHONE (OUTPATIENT)
Dept: EMERGENCY MEDICINE | Facility: HOSPITAL | Age: 53
End: 2023-08-08
Payer: MEDICARE

## 2023-08-09 DIAGNOSIS — Z71.89 COMPLEX CARE COORDINATION: ICD-10-CM

## 2023-08-10 LAB — MICROORGANISM SPEC CULT: ABNORMAL

## 2023-08-11 ENCOUNTER — PATIENT MESSAGE (OUTPATIENT)
Dept: FAMILY MEDICINE | Facility: CLINIC | Age: 53
End: 2023-08-11
Payer: MEDICARE

## 2023-08-16 PROBLEM — S61.431A PUNCTURE WOUND OF RIGHT HAND WITHOUT FOREIGN BODY: Status: ACTIVE | Noted: 2023-08-16

## 2023-08-28 ENCOUNTER — OFFICE VISIT (OUTPATIENT)
Dept: FAMILY MEDICINE | Facility: CLINIC | Age: 53
End: 2023-08-28
Payer: MEDICARE

## 2023-08-28 VITALS
WEIGHT: 207.63 LBS | OXYGEN SATURATION: 96 % | HEIGHT: 70 IN | SYSTOLIC BLOOD PRESSURE: 133 MMHG | BODY MASS INDEX: 29.72 KG/M2 | TEMPERATURE: 98 F | DIASTOLIC BLOOD PRESSURE: 86 MMHG | HEART RATE: 82 BPM

## 2023-08-28 DIAGNOSIS — S61.431A PUNCTURE WOUND OF RIGHT HAND WITHOUT FOREIGN BODY, INITIAL ENCOUNTER: Primary | ICD-10-CM

## 2023-08-28 DIAGNOSIS — R21 RASH: ICD-10-CM

## 2023-08-28 DIAGNOSIS — L97.521 ULCER OF LEFT FOOT, LIMITED TO BREAKDOWN OF SKIN: ICD-10-CM

## 2023-08-28 PROCEDURE — 99212 OFFICE O/P EST SF 10 MIN: CPT | Mod: ,,,

## 2023-08-28 PROCEDURE — 99212 PR OFFICE/OUTPT VISIT, EST, LEVL II, 10-19 MIN: ICD-10-PCS | Mod: ,,,

## 2023-08-28 PROCEDURE — 3008F PR BODY MASS INDEX (BMI) DOCUMENTED: ICD-10-PCS | Mod: ,,,

## 2023-08-28 PROCEDURE — 3060F PR POS MICROALBUMINURIA RESULT DOCUMENTED/REVIEW: ICD-10-PCS | Mod: ,,,

## 2023-08-28 PROCEDURE — 1160F PR REVIEW ALL MEDS BY PRESCRIBER/CLIN PHARMACIST DOCUMENTED: ICD-10-PCS | Mod: ,,,

## 2023-08-28 PROCEDURE — 3079F PR MOST RECENT DIASTOLIC BLOOD PRESSURE 80-89 MM HG: ICD-10-PCS | Mod: ,,,

## 2023-08-28 PROCEDURE — 3008F BODY MASS INDEX DOCD: CPT | Mod: ,,,

## 2023-08-28 PROCEDURE — 3075F PR MOST RECENT SYSTOLIC BLOOD PRESS GE 130-139MM HG: ICD-10-PCS | Mod: ,,,

## 2023-08-28 PROCEDURE — 3079F DIAST BP 80-89 MM HG: CPT | Mod: ,,,

## 2023-08-28 PROCEDURE — 1159F MED LIST DOCD IN RCRD: CPT | Mod: ,,,

## 2023-08-28 PROCEDURE — 1159F PR MEDICATION LIST DOCUMENTED IN MEDICAL RECORD: ICD-10-PCS | Mod: ,,,

## 2023-08-28 PROCEDURE — 3066F PR DOCUMENTATION OF TREATMENT FOR NEPHROPATHY: ICD-10-PCS | Mod: ,,,

## 2023-08-28 PROCEDURE — 1160F RVW MEDS BY RX/DR IN RCRD: CPT | Mod: ,,,

## 2023-08-28 PROCEDURE — 3066F NEPHROPATHY DOC TX: CPT | Mod: ,,,

## 2023-08-28 PROCEDURE — 3075F SYST BP GE 130 - 139MM HG: CPT | Mod: ,,,

## 2023-08-28 PROCEDURE — 3052F PR MOST RECENT HEMOGLOBIN A1C LEVEL 8.0 - < 9.0%: ICD-10-PCS | Mod: ,,,

## 2023-08-28 PROCEDURE — 3052F HG A1C>EQUAL 8.0%<EQUAL 9.0%: CPT | Mod: ,,,

## 2023-08-28 PROCEDURE — 3060F POS MICROALBUMINURIA REV: CPT | Mod: ,,,

## 2023-08-28 RX ORDER — LINCOMYCIN HYDROCHLORIDE 300 MG/ML
600 INJECTION, SOLUTION INTRAMUSCULAR; INTRAVENOUS; SUBCONJUNCTIVAL
Status: DISCONTINUED | OUTPATIENT
Start: 2023-08-28 | End: 2023-09-08

## 2023-08-28 RX ORDER — TRIAMCINOLONE ACETONIDE 1 MG/G
CREAM TOPICAL 2 TIMES DAILY
Qty: 45 G | Refills: 0 | Status: SHIPPED | OUTPATIENT
Start: 2023-08-28 | End: 2023-10-18

## 2023-08-28 RX ORDER — CLINDAMYCIN HYDROCHLORIDE 300 MG/1
300 CAPSULE ORAL 3 TIMES DAILY
Qty: 30 CAPSULE | Refills: 0 | Status: SHIPPED | OUTPATIENT
Start: 2023-08-28 | End: 2023-09-08 | Stop reason: SDUPTHER

## 2023-08-28 NOTE — PROGRESS NOTES
HAYLEY RIOS   [place] [id number]  [place]  [address]      PATIENT NAME: Bruno Levy  : 1970  DATE: 23  MRN: [id number]      Billing Provider: HAYLEY RIOS  Level of Service: DE OFFICE/OUTPT VISIT, EST, LEVL II, 10-19 MIN  Patient PCP Information       Provider PCP Type    Cam Sanon DO General            Reason for Visit / Chief Complaint: Wound Check (Pt is here for a recheck on his right hand. Pt reports it was a nail. ) and Insect Bite (Pt reports he has some bug bites. Pt states he was cutting grass and he think they are red bugs bites. )         History of Present Illness / Problem Focused Workflow     Bruno Levy presents to the clinic with Wound Check (Pt is here for a recheck on his right hand. Pt reports it was a nail. ) and Insect Bite (Pt reports he has some bug bites. Pt states he was cutting grass and he think they are red bugs bites. )     52 y/o male presents to clinic for Wound Check (Pt is here for a recheck on his right hand. Pt reports it was a nail. ) and Insect Bite (Pt reports he has some bug bites. Pt states he was cutting grass and he think they are red bugs bites. )    Review of Systems     @Review of Systems   Constitutional: Negative.  Negative for chills and fever.   HENT: Negative.     Respiratory: Negative.  Negative for cough, chest tightness, shortness of breath and wheezing.    Cardiovascular:  Negative for chest pain and palpitations.   Gastrointestinal:  Negative for abdominal pain and change in bowel habit.   Genitourinary:  Negative for dysuria.   Musculoskeletal: Negative.  Negative for myalgias.   Integumentary:  Positive for rash and wound.   Allergic/Immunologic: Negative.    Neurological: Negative.  Negative for weakness.   Psychiatric/Behavioral: Negative.  Negative for suicidal ideas.        Medical / Social / Family History     Past Medical History:   Diagnosis Date    Depression     Diabetes mellitus, type  2     History of methicillin resistant staphylococcus aureus (MRSA)     Hyperlipidemia     Hypertension     Neuropathy     Osteomyelitis        Past Surgical History:   Procedure Laterality Date    FRACTURE SURGERY  1976    arm    LUMBAR FUSION      NASAL SEPTUM SURGERY      SPINAL CORD STIMULATOR IMPLANT      SPINAL CORD STIMULATOR REMOVAL      TOE AMPUTATION      rt foot great to and 5th toe;lt foot great,4th and 5th toes.       Social History    reports that he has been smoking vaping with nicotine. He started smoking about 2 years ago. He has been exposed to tobacco smoke. He has never used smokeless tobacco. He reports that he does not currently use alcohol. He reports current drug use. Drug: Oxycodone.    Family History  's family history includes Bladder Cancer in his father; COPD in his mother; Hypertension in his brother; Lung cancer in his father.    Medications and Allergies     Medications  Outpatient Medications Marked as Taking for the 8/28/23 encounter (Office Visit) with Franklyn Acosta FNP   Medication Sig Dispense Refill    amLODIPine (NORVASC) 5 MG tablet Take 2 tablets (10 mg total) by mouth once daily. 180 tablet 1    atorvastatin (LIPITOR) 40 MG tablet Take 1 tablet (40 mg total) by mouth once daily. 90 tablet 3    baclofen (LIORESAL) 20 MG tablet Take 20 mg by mouth every 6 (six) hours as needed.      gabapentin (NEURONTIN) 600 MG tablet 1.5 tablets every 6 hours      metFORMIN (GLUCOPHAGE-XR) 500 MG ER 24hr tablet TAKE 2 TABLETS TWICE DAILY WITH MEALS (Patient taking differently: 500 mg 2 (two) times daily with meals. TAKE 2 TABLETS TWICE DAILY WITH MEALS) 360 tablet 1    oxyCODONE-acetaminophen (PERCOCET) 7.5-325 mg per tablet Take 1 tablet by mouth every 8 (eight) hours as needed.         Allergies  Review of patient's allergies indicates:   Allergen Reactions    Levofloxacin (bulk) Other (See Comments)     Worsened muscle spasms       Physical Examination     Vitals:    08/28/23  1545   BP: 133/86   Pulse: 82   Temp: 97.7 °F (36.5 °C)     Physical Exam  Vitals and nursing note reviewed.   Constitutional:       General: He is awake.      Appearance: Normal appearance.   HENT:      Head: Normocephalic.      Right Ear: Tympanic membrane, ear canal and external ear normal.      Left Ear: Tympanic membrane, ear canal and external ear normal.      Nose: Nose normal.      Mouth/Throat:      Lips: Pink.      Mouth: Mucous membranes are moist.      Pharynx: Oropharynx is clear. Uvula midline.   Eyes:      Pupils: Pupils are equal, round, and reactive to light.   Cardiovascular:      Rate and Rhythm: Normal rate and regular rhythm.      Heart sounds: Normal heart sounds, S1 normal and S2 normal.   Pulmonary:      Effort: Pulmonary effort is normal. No respiratory distress.      Breath sounds: Normal breath sounds. No decreased breath sounds, wheezing, rhonchi or rales.   Abdominal:      General: Bowel sounds are normal.   Musculoskeletal:         General: Normal range of motion.      Cervical back: Normal range of motion.   Skin:     General: Skin is warm.      Findings: Rash and wound present. Rash is macular and papular.      Comments: Macular papular rash noted to bilateral legs. No drainage or signs of infection. Puncture wound noted to palm with erythema, edema and mild drainage.    Neurological:      Mental Status: He is alert and oriented to person, place, and time.   Psychiatric:         Mood and Affect: Mood normal.         Behavior: Behavior normal.         Thought Content: Thought content does not include homicidal or suicidal ideation.               Lab Results   Component Value Date    WBC 11.23 (H) 08/07/2023    HGB 14.9 08/07/2023    HCT 44.0 08/07/2023    MCV 94.4 08/07/2023     08/07/2023        CMP  Sodium   Date Value Ref Range Status   08/07/2023 142 136 - 145 mmol/L Final   02/03/2020 140 136 - 145 mmol/L Final     Potassium   Date Value Ref Range Status   08/07/2023 3.5  3.5 - 5.1 mmol/L Final   02/03/2020 4.3 3.5 - 5.1 mmol/L Final     Chloride   Date Value Ref Range Status   08/07/2023 100 98 - 107 mmol/L Final   02/03/2020 105 100 - 109 mmol/L Final     CO2   Date Value Ref Range Status   08/07/2023 33 (H) 21 - 32 mmol/L Final     Carbon Dioxide   Date Value Ref Range Status   02/03/2020 24 22 - 33 mmol/L Final     Glucose   Date Value Ref Range Status   08/07/2023 241 (H) 74 - 106 mg/dL Final     BUN   Date Value Ref Range Status   08/07/2023 9 7 - 18 mg/dL Final     Blood Urea Nitrogen   Date Value Ref Range Status   02/03/2020 14 5 - 25 mg/dL Final     Creatinine   Date Value Ref Range Status   08/07/2023 0.89 0.70 - 1.30 mg/dL Final   02/03/2020 0.68 0.57 - 1.25 mg/dL Final     Calcium   Date Value Ref Range Status   08/07/2023 9.0 8.5 - 10.1 mg/dL Final   02/03/2020 8.8 8.8 - 10.6 mg/dL Final     Total Protein   Date Value Ref Range Status   08/07/2023 7.7 6.4 - 8.2 g/dL Final     Albumin   Date Value Ref Range Status   08/07/2023 3.8 3.5 - 5.0 g/dL Final     Albumin Level   Date Value Ref Range Status   02/03/2020 3.2 (L) 3.5 - 5.0 g/dl Final     Bilirubin, Total   Date Value Ref Range Status   08/07/2023 0.7 >0.0 - 1.2 mg/dL Final     Alk Phos   Date Value Ref Range Status   08/07/2023 89 45 - 115 U/L Final     AST   Date Value Ref Range Status   08/07/2023 17 15 - 37 U/L Final     ALT   Date Value Ref Range Status   08/07/2023 35 16 - 61 U/L Final     Anion Gap   Date Value Ref Range Status   08/07/2023 13 7 - 16 mmol/L Final   02/03/2020 11 8 - 16 mmol/L Final     eGFR   Date Value Ref Range Status   08/07/2023 102 >=60 mL/min/1.73m2 Final     Procedures   Assessment and Plan (including Health Maintenance)   :    Plan:           Problem List Items Addressed This Visit          Derm    Rash    Current Assessment & Plan     Triamcinolone cream RX. Medication instructions and education given with understanding voiced.            Orthopedic    Ulcer of left foot, limited to  breakdown of skin    Current Assessment & Plan     Referral podiatry. Will call pt with appt date and time.         Relevant Orders    Ambulatory referral/consult to Podiatry    Puncture wound of right hand without foreign body - Primary    Current Assessment & Plan     Healing puncture wound noted. Erythema and edema still noted around wound. Lincocin IM today. Clindamycin RX. Medication instructions and education given  Follow up 1 week.            Health Maintenance Topics with due status: Not Due       Topic Last Completion Date    Diabetes Urine Screening 03/17/2023    Foot Exam 03/17/2023    Lipid Panel 03/17/2023    TETANUS VACCINE 08/04/2023    Low Dose Statin 09/08/2023    PROSTATE-SPECIFIC ANTIGEN 10/18/2023    Hemoglobin A1c 10/18/2023       Future Appointments   Date Time Provider Department Center   11/14/2024  1:00 PM [facility] [place]        Health Maintenance Due   Topic Date Due    Hepatitis C Screening  Never done    COVID-19 Vaccine (1) Never done    HIV Screening  Never done    Colorectal Cancer Screening  Never done    Pneumococcal Vaccines (Age 0-64) (2 - PCV) 08/28/2016    Eye Exam  01/26/2017    Shingles Vaccine (1 of 2) Never done        Follow up in about 1 week (around 9/4/2023).     Signature:  HAYLEY RIOS  [facility]  [address] [place] [address], [place]  [address]    Date of encounter: 8/28/23

## 2023-09-05 ENCOUNTER — OFFICE VISIT (OUTPATIENT)
Dept: FAMILY MEDICINE | Facility: CLINIC | Age: 53
End: 2023-09-05
Payer: MEDICARE

## 2023-09-05 ENCOUNTER — PATIENT OUTREACH (OUTPATIENT)
Dept: ADMINISTRATIVE | Facility: HOSPITAL | Age: 53
End: 2023-09-05

## 2023-09-05 VITALS
TEMPERATURE: 98 F | HEIGHT: 70 IN | OXYGEN SATURATION: 96 % | BODY MASS INDEX: 29.46 KG/M2 | DIASTOLIC BLOOD PRESSURE: 82 MMHG | SYSTOLIC BLOOD PRESSURE: 126 MMHG | HEART RATE: 98 BPM | WEIGHT: 205.81 LBS

## 2023-09-05 DIAGNOSIS — S61.239D: Primary | ICD-10-CM

## 2023-09-05 PROCEDURE — 1160F RVW MEDS BY RX/DR IN RCRD: CPT | Mod: ,,,

## 2023-09-05 PROCEDURE — 1159F MED LIST DOCD IN RCRD: CPT | Mod: ,,,

## 2023-09-05 PROCEDURE — 3044F HG A1C LEVEL LT 7.0%: CPT | Mod: ,,,

## 2023-09-05 PROCEDURE — 3074F SYST BP LT 130 MM HG: CPT | Mod: ,,,

## 2023-09-05 PROCEDURE — 3060F PR POS MICROALBUMINURIA RESULT DOCUMENTED/REVIEW: ICD-10-PCS | Mod: ,,,

## 2023-09-05 PROCEDURE — 3079F PR MOST RECENT DIASTOLIC BLOOD PRESSURE 80-89 MM HG: ICD-10-PCS | Mod: ,,,

## 2023-09-05 PROCEDURE — 3008F BODY MASS INDEX DOCD: CPT | Mod: ,,,

## 2023-09-05 PROCEDURE — 3066F PR DOCUMENTATION OF TREATMENT FOR NEPHROPATHY: ICD-10-PCS | Mod: ,,,

## 2023-09-05 PROCEDURE — 3066F NEPHROPATHY DOC TX: CPT | Mod: ,,,

## 2023-09-05 PROCEDURE — 96372 THER/PROPH/DIAG INJ SC/IM: CPT | Mod: ,,,

## 2023-09-05 PROCEDURE — 96372 PR INJECTION,THERAP/PROPH/DIAG2ST, IM OR SUBCUT: ICD-10-PCS | Mod: ,,,

## 2023-09-05 PROCEDURE — 3044F PR MOST RECENT HEMOGLOBIN A1C LEVEL <7.0%: ICD-10-PCS | Mod: ,,,

## 2023-09-05 PROCEDURE — 99213 PR OFFICE/OUTPT VISIT, EST, LEVL III, 20-29 MIN: ICD-10-PCS | Mod: 25,,,

## 2023-09-05 PROCEDURE — 3008F PR BODY MASS INDEX (BMI) DOCUMENTED: ICD-10-PCS | Mod: ,,,

## 2023-09-05 PROCEDURE — 99213 OFFICE O/P EST LOW 20 MIN: CPT | Mod: 25,,,

## 2023-09-05 PROCEDURE — 3060F POS MICROALBUMINURIA REV: CPT | Mod: ,,,

## 2023-09-05 PROCEDURE — 1160F PR REVIEW ALL MEDS BY PRESCRIBER/CLIN PHARMACIST DOCUMENTED: ICD-10-PCS | Mod: ,,,

## 2023-09-05 PROCEDURE — 3079F DIAST BP 80-89 MM HG: CPT | Mod: ,,,

## 2023-09-05 PROCEDURE — 1159F PR MEDICATION LIST DOCUMENTED IN MEDICAL RECORD: ICD-10-PCS | Mod: ,,,

## 2023-09-05 PROCEDURE — 3074F PR MOST RECENT SYSTOLIC BLOOD PRESSURE < 130 MM HG: ICD-10-PCS | Mod: ,,,

## 2023-09-05 RX ORDER — LINCOMYCIN HYDROCHLORIDE 300 MG/ML
600 INJECTION, SOLUTION INTRAMUSCULAR; INTRAVENOUS; SUBCONJUNCTIVAL
Status: COMPLETED | OUTPATIENT
Start: 2023-09-05 | End: 2023-09-05

## 2023-09-05 RX ADMIN — LINCOMYCIN HYDROCHLORIDE 600 MG: 300 INJECTION, SOLUTION INTRAMUSCULAR; INTRAVENOUS; SUBCONJUNCTIVAL at 04:09

## 2023-09-05 NOTE — PROGRESS NOTES
HAYLEY RIOS   [facility]  [address]  [place]  [address]      PATIENT NAME: Bruno Levy  : 1970  DATE: 23  MRN: [id number]      Billing Provider: HAYLEY RIOS  Level of Service: CT OFFICE/OUTPT VISIT, EST, LEVL III, 20-29 MIN  Patient PCP Information       Provider PCP Type    Cam Sanon DO General            Reason for Visit / Chief Complaint: Follow-up (1 week follow up. )         History of Present Illness / Problem Focused Workflow     Bruno Levy presents to the clinic with Follow-up (1 week follow up. )     54 y/o male presents to clinic for recheck of wound. States area is feeling much better. He states he is still taking antibiotic as prescribed.         Review of Systems     @Review of Systems   Constitutional:  Negative for chills and fever.   Respiratory:  Negative for cough, chest tightness, shortness of breath and wheezing.    Cardiovascular:  Negative for chest pain and palpitations.   Gastrointestinal:  Negative for abdominal pain, nausea and vomiting.   Musculoskeletal:  Negative for arthralgias, back pain, gait problem, joint swelling and neck pain.   Integumentary:  Positive for wound.   Neurological:  Negative for dizziness, seizures, weakness, numbness and headaches.   Psychiatric/Behavioral:  Negative for suicidal ideas.        Medical / Social / Family History     Past Medical History:   Diagnosis Date    Depression     Diabetes mellitus, type 2     History of methicillin resistant staphylococcus aureus (MRSA)     Hyperlipidemia     Hypertension     Neuropathy     Osteomyelitis        Past Surgical History:   Procedure Laterality Date    FRACTURE SURGERY      arm    LUMBAR FUSION      NASAL SEPTUM SURGERY      SPINAL CORD STIMULATOR IMPLANT      SPINAL CORD STIMULATOR REMOVAL      TOE AMPUTATION      rt foot great to and 5th toe;lt foot great,4th and 5th toes.       Social History    reports that he has been smoking vaping with  nicotine. He has been exposed to tobacco smoke. He has never used smokeless tobacco. He reports that he does not currently use alcohol. He reports current drug use. Drug: Oxycodone.    Family History  's family history includes Bladder Cancer in his father; COPD in his mother; Hypertension in his brother; Lung cancer in his father.    Medications and Allergies     Medications  Outpatient Medications Marked as Taking for the 9/5/23 encounter (Office Visit) with Franklyn Acosta FNP   Medication Sig Dispense Refill    amLODIPine (NORVASC) 5 MG tablet Take 2 tablets (10 mg total) by mouth once daily. 180 tablet 1    atorvastatin (LIPITOR) 40 MG tablet Take 1 tablet (40 mg total) by mouth once daily. 90 tablet 3    baclofen (LIORESAL) 20 MG tablet Take 20 mg by mouth every 6 (six) hours as needed.      clindamycin (CLEOCIN) 300 MG capsule Take 1 capsule (300 mg total) by mouth 3 (three) times daily. for 10 days 30 capsule 0    gabapentin (NEURONTIN) 600 MG tablet 1.5 tablets every 6 hours      metFORMIN (GLUCOPHAGE-XR) 500 MG ER 24hr tablet TAKE 2 TABLETS TWICE DAILY WITH MEALS (Patient taking differently: 500 mg 2 (two) times daily with meals. TAKE 2 TABLETS TWICE DAILY WITH MEALS) 360 tablet 1    oxyCODONE-acetaminophen (PERCOCET) 7.5-325 mg per tablet Take 1 tablet by mouth every 8 (eight) hours as needed.      triamcinolone acetonide 0.1% (KENALOG) 0.1 % cream Apply topically 2 (two) times daily. 45 g 0     Current Facility-Administered Medications for the 9/5/23 encounter (Office Visit) with Franklyn Acosta FNP   Medication Dose Route Frequency Provider Last Rate Last Admin    lincomycin injection 600 mg  600 mg Intramuscular 1 time in Clinic/HOD Franklyn Acosta FNP           Allergies  Review of patient's allergies indicates:   Allergen Reactions    Levofloxacin (bulk) Other (See Comments)     Worsened muscle spasms       Physical Examination     Vitals:    09/05/23 1532   BP: 126/82   Pulse: 98   Temp:  98.1 °F (36.7 °C)     Physical Exam  Vitals and nursing note reviewed.   Constitutional:       General: He is not in acute distress.     Appearance: Normal appearance. He is obese.   HENT:      Head: Normocephalic.   Eyes:      Pupils: Pupils are equal, round, and reactive to light.   Cardiovascular:      Rate and Rhythm: Normal rate and regular rhythm.      Heart sounds: Normal heart sounds, S1 normal and S2 normal. No murmur heard.     No friction rub. No gallop.   Pulmonary:      Effort: Pulmonary effort is normal. No respiratory distress.      Breath sounds: Normal breath sounds. No decreased breath sounds, wheezing, rhonchi or rales.   Abdominal:      General: Bowel sounds are normal.      Palpations: Abdomen is soft.   Musculoskeletal:         General: No swelling or tenderness. Normal range of motion.      Cervical back: Normal range of motion.   Skin:     General: Skin is warm and dry.      Capillary Refill: Capillary refill takes less than 2 seconds.      Findings: Wound present.      Comments: Approximately 2 cm area to right palm still noted with erythema. No drainage or tenderness noted to area.   Neurological:      Mental Status: He is alert and oriented to person, place, and time.   Psychiatric:         Attention and Perception: Attention normal.         Mood and Affect: Mood normal.         Behavior: Behavior normal. Behavior is cooperative.         Thought Content: Thought content does not include homicidal or suicidal ideation.               Lab Results   Component Value Date    WBC 11.23 (H) 08/07/2023    HGB 14.9 08/07/2023    HCT 44.0 08/07/2023    MCV 94.4 08/07/2023     08/07/2023        CMP  Sodium   Date Value Ref Range Status   08/07/2023 142 136 - 145 mmol/L Final   02/03/2020 140 136 - 145 mmol/L Final     Potassium   Date Value Ref Range Status   08/07/2023 3.5 3.5 - 5.1 mmol/L Final   02/03/2020 4.3 3.5 - 5.1 mmol/L Final     Chloride   Date Value Ref Range Status   08/07/2023 100  98 - 107 mmol/L Final   02/03/2020 105 100 - 109 mmol/L Final     CO2   Date Value Ref Range Status   08/07/2023 33 (H) 21 - 32 mmol/L Final     Carbon Dioxide   Date Value Ref Range Status   02/03/2020 24 22 - 33 mmol/L Final     Glucose   Date Value Ref Range Status   08/07/2023 241 (H) 74 - 106 mg/dL Final     BUN   Date Value Ref Range Status   08/07/2023 9 7 - 18 mg/dL Final     Blood Urea Nitrogen   Date Value Ref Range Status   02/03/2020 14 5 - 25 mg/dL Final     Creatinine   Date Value Ref Range Status   08/07/2023 0.89 0.70 - 1.30 mg/dL Final   02/03/2020 0.68 0.57 - 1.25 mg/dL Final     Calcium   Date Value Ref Range Status   08/07/2023 9.0 8.5 - 10.1 mg/dL Final   02/03/2020 8.8 8.8 - 10.6 mg/dL Final     Total Protein   Date Value Ref Range Status   08/07/2023 7.7 6.4 - 8.2 g/dL Final     Albumin   Date Value Ref Range Status   08/07/2023 3.8 3.5 - 5.0 g/dL Final     Albumin Level   Date Value Ref Range Status   02/03/2020 3.2 (L) 3.5 - 5.0 g/dl Final     Bilirubin, Total   Date Value Ref Range Status   08/07/2023 0.7 >0.0 - 1.2 mg/dL Final     Alk Phos   Date Value Ref Range Status   08/07/2023 89 45 - 115 U/L Final     AST   Date Value Ref Range Status   08/07/2023 17 15 - 37 U/L Final     ALT   Date Value Ref Range Status   08/07/2023 35 16 - 61 U/L Final     Anion Gap   Date Value Ref Range Status   08/07/2023 13 7 - 16 mmol/L Final   02/03/2020 11 8 - 16 mmol/L Final     eGFR   Date Value Ref Range Status   08/07/2023 102 >=60 mL/min/1.73m2 Final     Procedures   Assessment and Plan (including Health Maintenance)   :    Plan:           Problem List Items Addressed This Visit          Orthopedic    Puncture wound of finger of right hand - Primary    Current Assessment & Plan     Lincocin IM today. Pt to continue current antibiotic until finished. Follow up Friday for recheck            Health Maintenance Topics with due status: Not Due       Topic Last Completion Date    Diabetes Urine Screening  03/17/2023    Foot Exam 03/17/2023    Lipid Panel 03/17/2023    TETANUS VACCINE 08/04/2023    Low Dose Statin 09/05/2023       Future Appointments   Date Time Provider Department Center   9/8/2023 10:20 AM Franklyn Acosta FNP [facility]        Health Maintenance Due   Topic Date Due    PROSTATE-SPECIFIC ANTIGEN  Never done    Colorectal Cancer Screening  Never done    Pneumococcal Vaccines (Age 0-64) (2 - PCV) 08/28/2016    Eye Exam  01/26/2017    Shingles Vaccine (1 of 2) Never done    Influenza Vaccine (1) 09/01/2023    Hemoglobin A1c  09/17/2023        Follow up in about 3 days (around 9/8/2023).     Signature:  HAYLEY RIOS  [address] [place]  [address]    Date of encounter: 9/5/23

## 2023-09-05 NOTE — PROGRESS NOTES
[employer] Chart Audit for the following: Colorectal Cancer Screening.    Unable to locate any Colorectal Cancer Screenings at this time. Reviewed , [employer], [employer], [employer], and [employer]. Care Everywhere Updated.    Updated 09/05/23 Appointment Note to include: Due for Colorectal Cancer Screening.

## 2023-09-06 PROBLEM — S61.239A PUNCTURE WOUND OF FINGER OF RIGHT HAND: Status: ACTIVE | Noted: 2023-09-06

## 2023-09-08 ENCOUNTER — PATIENT OUTREACH (OUTPATIENT)
Dept: ADMINISTRATIVE | Facility: HOSPITAL | Age: 53
End: 2023-09-08

## 2023-09-08 ENCOUNTER — OFFICE VISIT (OUTPATIENT)
Dept: FAMILY MEDICINE | Facility: CLINIC | Age: 53
End: 2023-09-08
Payer: MEDICARE

## 2023-09-08 VITALS
SYSTOLIC BLOOD PRESSURE: 122 MMHG | TEMPERATURE: 98 F | HEIGHT: 70 IN | RESPIRATION RATE: 18 BRPM | HEART RATE: 76 BPM | DIASTOLIC BLOOD PRESSURE: 80 MMHG | OXYGEN SATURATION: 96 % | BODY MASS INDEX: 29.53 KG/M2

## 2023-09-08 DIAGNOSIS — S61.431A PUNCTURE WOUND OF RIGHT HAND WITHOUT FOREIGN BODY, INITIAL ENCOUNTER: ICD-10-CM

## 2023-09-08 PROCEDURE — 3052F HG A1C>EQUAL 8.0%<EQUAL 9.0%: CPT | Mod: ,,,

## 2023-09-08 PROCEDURE — 3008F BODY MASS INDEX DOCD: CPT | Mod: ,,,

## 2023-09-08 PROCEDURE — 3052F PR MOST RECENT HEMOGLOBIN A1C LEVEL 8.0 - < 9.0%: ICD-10-PCS | Mod: ,,,

## 2023-09-08 PROCEDURE — 99213 PR OFFICE/OUTPT VISIT, EST, LEVL III, 20-29 MIN: ICD-10-PCS | Mod: ,,,

## 2023-09-08 PROCEDURE — 3008F PR BODY MASS INDEX (BMI) DOCUMENTED: ICD-10-PCS | Mod: ,,,

## 2023-09-08 PROCEDURE — 3074F PR MOST RECENT SYSTOLIC BLOOD PRESSURE < 130 MM HG: ICD-10-PCS | Mod: ,,,

## 2023-09-08 PROCEDURE — 99213 OFFICE O/P EST LOW 20 MIN: CPT | Mod: ,,,

## 2023-09-08 PROCEDURE — 3079F DIAST BP 80-89 MM HG: CPT | Mod: ,,,

## 2023-09-08 PROCEDURE — 3060F POS MICROALBUMINURIA REV: CPT | Mod: ,,,

## 2023-09-08 PROCEDURE — 3079F PR MOST RECENT DIASTOLIC BLOOD PRESSURE 80-89 MM HG: ICD-10-PCS | Mod: ,,,

## 2023-09-08 PROCEDURE — 3074F SYST BP LT 130 MM HG: CPT | Mod: ,,,

## 2023-09-08 PROCEDURE — 3066F PR DOCUMENTATION OF TREATMENT FOR NEPHROPATHY: ICD-10-PCS | Mod: ,,,

## 2023-09-08 PROCEDURE — 3060F PR POS MICROALBUMINURIA RESULT DOCUMENTED/REVIEW: ICD-10-PCS | Mod: ,,,

## 2023-09-08 PROCEDURE — 3066F NEPHROPATHY DOC TX: CPT | Mod: ,,,

## 2023-09-08 RX ORDER — CLINDAMYCIN HYDROCHLORIDE 300 MG/1
300 CAPSULE ORAL 3 TIMES DAILY
Qty: 30 CAPSULE | Refills: 0 | Status: SHIPPED | OUTPATIENT
Start: 2023-09-08 | End: 2023-09-18

## 2023-09-08 NOTE — PROGRESS NOTES
HAYLEY RIOS   [facility]  [address]  [place]  [address]      PATIENT NAME: Bruno Levy  : 1970  DATE: 23  MRN: [id number]      Billing Provider: HAYLEY RIOS  Level of Service: [place] OFFICE/OUTPT VISIT, EST, LEVL III, 20-29 MIN  Patient PCP Information       Provider PCP Type    Cam Sanon DO General            Reason for Visit / Chief Complaint: Wound Check         History of Present Illness / Problem Focused Workflow     Bruno Levy presents to the clinic with Wound Check     52 y/o male presents to clinic for wound check. He does state area on right palm is feeling much better.     Review of Systems     @Review of Systems   Constitutional: Negative.  Negative for chills, fatigue and fever.   HENT: Negative.     Respiratory: Negative.  Negative for cough, chest tightness and shortness of breath.    Cardiovascular:  Negative for chest pain and palpitations.   Gastrointestinal:  Negative for abdominal pain, change in bowel habit, nausea and vomiting.   Musculoskeletal: Negative.  Negative for myalgias.   Integumentary:  Positive for wound.   Allergic/Immunologic: Negative.    Neurological: Negative.  Negative for dizziness, weakness, light-headedness and headaches.   Psychiatric/Behavioral: Negative.  Negative for suicidal ideas.        Medical / Social / Family History     Past Medical History:   Diagnosis Date    Depression     Diabetes mellitus, type 2     History of methicillin resistant staphylococcus aureus (MRSA)     Hyperlipidemia     Hypertension     Neuropathy     Osteomyelitis        Past Surgical History:   Procedure Laterality Date    FRACTURE SURGERY      arm    LUMBAR FUSION      NASAL SEPTUM SURGERY      SPINAL CORD STIMULATOR IMPLANT      SPINAL CORD STIMULATOR REMOVAL      TOE AMPUTATION      rt foot great to and 5th toe;lt foot great,4th and 5th toes.       Social History    reports that he has been smoking vaping with nicotine. He  started smoking about 2 years ago. He has been exposed to tobacco smoke. He has never used smokeless tobacco. He reports that he does not currently use alcohol. He reports current drug use. Drug: Oxycodone.    Family History  's family history includes Bladder Cancer in his father; COPD in his mother; Hypertension in his brother; Lung cancer in his father.    Medications and Allergies     Medications  Outpatient Medications Marked as Taking for the 9/8/23 encounter (Office Visit) with Franklyn Acosta FNP   Medication Sig Dispense Refill    amLODIPine (NORVASC) 5 MG tablet Take 2 tablets (10 mg total) by mouth once daily. 180 tablet 1    atorvastatin (LIPITOR) 40 MG tablet Take 1 tablet (40 mg total) by mouth once daily. 90 tablet 3    baclofen (LIORESAL) 20 MG tablet Take 20 mg by mouth every 6 (six) hours as needed.      gabapentin (NEURONTIN) 600 MG tablet 1.5 tablets every 6 hours      metFORMIN (GLUCOPHAGE-XR) 500 MG ER 24hr tablet TAKE 2 TABLETS TWICE DAILY WITH MEALS (Patient taking differently: 500 mg 2 (two) times daily with meals. TAKE 2 TABLETS TWICE DAILY WITH MEALS) 360 tablet 1    oxyCODONE-acetaminophen (PERCOCET) 7.5-325 mg per tablet Take 1 tablet by mouth every 8 (eight) hours as needed.      [DISCONTINUED] mupirocin (BACTROBAN) 2 % ointment Apply topically once daily. (Patient not taking: Reported on 10/18/2023) 30 g 0    [DISCONTINUED] triamcinolone acetonide 0.1% (KENALOG) 0.1 % cream Apply topically 2 (two) times daily. (Patient not taking: Reported on 10/18/2023) 45 g 0       Allergies  Review of patient's allergies indicates:   Allergen Reactions    Levofloxacin (bulk) Other (See Comments)     Worsened muscle spasms       Physical Examination     Vitals:    09/08/23 1112   BP: 122/80   Pulse: 76   Resp: 18   Temp: 97.7 °F (36.5 °C)     Physical Exam  Vitals and nursing note reviewed.   Constitutional:       Appearance: Normal appearance. He is overweight.   HENT:      Head: Normocephalic.       Right Ear: Tympanic membrane normal.      Left Ear: Tympanic membrane normal.      Nose: Nose normal.      Mouth/Throat:      Lips: Pink.      Mouth: Mucous membranes are moist.      Pharynx: Oropharynx is clear. Uvula midline.   Eyes:      Pupils: Pupils are equal, round, and reactive to light.   Neck:      Vascular: No carotid bruit.   Cardiovascular:      Rate and Rhythm: Normal rate and regular rhythm.      Heart sounds: Normal heart sounds, S1 normal and S2 normal.   Pulmonary:      Effort: Pulmonary effort is normal. No respiratory distress.      Breath sounds: Normal breath sounds. No decreased breath sounds, wheezing, rhonchi or rales.   Abdominal:      General: Bowel sounds are normal.      Palpations: Abdomen is soft.      Tenderness: There is no abdominal tenderness.   Musculoskeletal:         General: Normal range of motion.      Cervical back: Normal range of motion.   Skin:     General: Skin is warm.      Capillary Refill: Capillary refill takes less than 2 seconds.      Comments: Healing wound to right palm noted with mild edema and erythema noted. No drainage noted.   Neurological:      Mental Status: He is alert and oriented to person, place, and time.   Psychiatric:         Mood and Affect: Mood normal.         Behavior: Behavior normal.         Thought Content: Thought content does not include homicidal or suicidal ideation.               Lab Results   Component Value Date    WBC 11.23 (H) 08/07/2023    HGB 14.9 08/07/2023    HCT 44.0 08/07/2023    MCV 94.4 08/07/2023     08/07/2023        CMP  Sodium   Date Value Ref Range Status   08/07/2023 142 136 - 145 mmol/L Final   02/03/2020 140 136 - 145 mmol/L Final     Potassium   Date Value Ref Range Status   08/07/2023 3.5 3.5 - 5.1 mmol/L Final   02/03/2020 4.3 3.5 - 5.1 mmol/L Final     Chloride   Date Value Ref Range Status   08/07/2023 100 98 - 107 mmol/L Final   02/03/2020 105 100 - 109 mmol/L Final     CO2   Date Value Ref Range  Status   08/07/2023 33 (H) 21 - 32 mmol/L Final     Carbon Dioxide   Date Value Ref Range Status   02/03/2020 24 22 - 33 mmol/L Final     Glucose   Date Value Ref Range Status   08/07/2023 241 (H) 74 - 106 mg/dL Final     BUN   Date Value Ref Range Status   08/07/2023 9 7 - 18 mg/dL Final     Blood Urea Nitrogen   Date Value Ref Range Status   02/03/2020 14 5 - 25 mg/dL Final     Creatinine   Date Value Ref Range Status   08/07/2023 0.89 0.70 - 1.30 mg/dL Final   02/03/2020 0.68 0.57 - 1.25 mg/dL Final     Calcium   Date Value Ref Range Status   08/07/2023 9.0 8.5 - 10.1 mg/dL Final   02/03/2020 8.8 8.8 - 10.6 mg/dL Final     Total Protein   Date Value Ref Range Status   08/07/2023 7.7 6.4 - 8.2 g/dL Final     Albumin   Date Value Ref Range Status   08/07/2023 3.8 3.5 - 5.0 g/dL Final     Albumin Level   Date Value Ref Range Status   02/03/2020 3.2 (L) 3.5 - 5.0 g/dl Final     Bilirubin, Total   Date Value Ref Range Status   08/07/2023 0.7 >0.0 - 1.2 mg/dL Final     Alk Phos   Date Value Ref Range Status   08/07/2023 89 45 - 115 U/L Final     AST   Date Value Ref Range Status   08/07/2023 17 15 - 37 U/L Final     ALT   Date Value Ref Range Status   08/07/2023 35 16 - 61 U/L Final     Anion Gap   Date Value Ref Range Status   08/07/2023 13 7 - 16 mmol/L Final   02/03/2020 11 8 - 16 mmol/L Final     eGFR   Date Value Ref Range Status   08/07/2023 102 >=60 mL/min/1.73m2 Final     Procedures   Assessment and Plan (including Health Maintenance)   :    Plan:           Problem List Items Addressed This Visit          Orthopedic    Puncture wound of right hand without foreign body    Current Assessment & Plan     Refill on clindamycin RX today. Pt is to follow up 1 weeks to evaluate wound            Health Maintenance Topics with due status: Not Due       Topic Last Completion Date    Diabetes Urine Screening 03/17/2023    Foot Exam 03/17/2023    Lipid Panel 03/17/2023    TETANUS VACCINE 08/04/2023    PROSTATE-SPECIFIC  ANTIGEN 10/18/2023    Hemoglobin A1c 10/18/2023    Low Dose Statin 11/24/2023       Future Appointments   Date Time Provider Department Center   11/14/2024  1:00 PM AWV NURSE [facility] RAMIRO Estrada [place]        Health Maintenance Due   Topic Date Due    Hepatitis C Screening  Never done    COVID-19 Vaccine (1) Never done    HIV Screening  Never done    Colorectal Cancer Screening  Never done    Pneumococcal Vaccines (Age 0-64) (2 - PCV) 08/28/2016    Eye Exam  01/26/2017    Shingles Vaccine (1 of 2) Never done        Follow up in about 1 week (around 9/15/2023).     Signature:  HAYLEY RIOS  [facility]  [id number] [address] [place]  [address]    Date of encounter: 9/8/23

## 2023-09-15 PROBLEM — R52 PAIN: Status: RESOLVED | Noted: 2023-05-31 | Resolved: 2023-09-15

## 2023-09-15 PROBLEM — R53.1 DECREASED STRENGTH: Status: RESOLVED | Noted: 2023-05-31 | Resolved: 2023-09-15

## 2023-09-18 ENCOUNTER — PATIENT MESSAGE (OUTPATIENT)
Dept: FAMILY MEDICINE | Facility: CLINIC | Age: 53
End: 2023-09-18
Payer: MEDICARE

## 2023-09-18 DIAGNOSIS — G62.9 NEUROPATHY: Primary | ICD-10-CM

## 2023-09-19 NOTE — TELEPHONE ENCOUNTER
Pt is already established with [facility], needs a referral for his insurance to continue covering his visits.

## 2023-10-16 NOTE — PROGRESS NOTES
"    Bruno Levy presented for a  Medicare AWV and comprehensive Health Risk Assessment today. The following components were reviewed and updated:    Medical history  Family History  Social history  Allergies and Current Medications  Health Risk Assessment  Health Maintenance  Care Team         ** See Completed Assessments for Annual Wellness Visit within the encounter summary.**         The following assessments were completed:  Living Situation  CAGE  Depression Screening  Timed Get Up and Go  Whisper Test  Cognitive Function Screening  Nutrition Screening  ADL Screening  PAQ Screening          Vitals:    10/18/23 0847   BP: 138/84   BP Location: Left arm   Patient Position: Sitting   Pulse: 86   Resp: 20   Temp: 98.1 °F (36.7 °C)   SpO2: 95%   Weight: 93.5 kg (206 lb 3.2 oz)   Height: 5' 10" (1.778 m)     Body mass index is 29.59 kg/m².  Physical Exam  Vitals and nursing note reviewed.   Constitutional:       General: He is awake.      Appearance: Normal appearance.   HENT:      Head: Normocephalic.      Right Ear: Tympanic membrane, ear canal and external ear normal.      Left Ear: Tympanic membrane, ear canal and external ear normal.      Nose: Nose normal.      Mouth/Throat:      Lips: Pink.      Mouth: Mucous membranes are moist.      Pharynx: Oropharynx is clear. Uvula midline.   Cardiovascular:      Rate and Rhythm: Normal rate and regular rhythm.      Heart sounds: Normal heart sounds, S1 normal and S2 normal.   Pulmonary:      Effort: Pulmonary effort is normal. No respiratory distress.      Breath sounds: Normal breath sounds. No decreased breath sounds, wheezing, rhonchi or rales.   Abdominal:      General: Bowel sounds are normal.      Palpations: Abdomen is soft.      Tenderness: There is no abdominal tenderness.   Musculoskeletal:      Cervical back: Normal range of motion.   Skin:     General: Skin is warm.      Capillary Refill: Capillary refill takes less than 2 seconds.   Neurological:      Mental " Status: He is alert and oriented to person, place, and time.   Psychiatric:         Thought Content: Thought content does not include homicidal or suicidal ideation. Thought content does not include homicidal or suicidal plan.           Diagnoses and health risks identified today and associated recommendations/orders:    Problem List Items Addressed This Visit          Neuro    Lumbar disc disease       Psychiatric    Depression, recurrent       Cardiac/Vascular    Essential hypertension    Hyperlipidemia       Endocrine    Type 2 diabetes mellitus with neurologic complication, without long-term current use of insulin     Other Visit Diagnoses       Encounter for initial annual wellness visit (AWV) in Medicare patient    -  Primary    BMI 29.0-29.9,adult        Encounter for screening for malignant neoplasm of prostate        History of falling        Positive depression screening        I have reviewed the positive depression score which warrants active treatment with psychotherapy and/or medications.            Provided Bruno with a 5-10 year written screening schedule and personal prevention plan. Recommendations were developed using the USPSTF age appropriate recommendations. Education, counseling, and referrals were provided as needed. After Visit Summary printed and given to patient which includes a list of additional screenings\tests needed.    Follow up for yearly annual wellness visit.    Declined all vaccines, Colonoscopy, HIV Screening  Pt reports he wants to set up eye exam.    I offered to discuss advanced care planning, including how to pick a person who would make decisions for you if you were unable to make them for yourself, called a health care power of , and what kind of decisions you might make such as use of life sustaining treatments such as ventilators and tube feeding when faced with a life limiting illness recorded on a living will that they will need to know. (How you want to be  cared for as you near the end of your natural life)     X  Patient is unwilling to engage in a discussion regarding advance directives at this time.

## 2023-10-18 ENCOUNTER — OFFICE VISIT (OUTPATIENT)
Dept: FAMILY MEDICINE | Facility: CLINIC | Age: 53
End: 2023-10-18
Payer: MEDICARE

## 2023-10-18 VITALS
BODY MASS INDEX: 29.52 KG/M2 | SYSTOLIC BLOOD PRESSURE: 138 MMHG | OXYGEN SATURATION: 95 % | HEART RATE: 86 BPM | DIASTOLIC BLOOD PRESSURE: 84 MMHG | TEMPERATURE: 98 F | RESPIRATION RATE: 20 BRPM | HEIGHT: 70 IN | WEIGHT: 206.19 LBS

## 2023-10-18 DIAGNOSIS — Z91.81 HISTORY OF FALLING: ICD-10-CM

## 2023-10-18 DIAGNOSIS — Z00.00 ENCOUNTER FOR PREVENTIVE HEALTH EXAMINATION: ICD-10-CM

## 2023-10-18 DIAGNOSIS — E11.43 TYPE 2 DIABETES MELLITUS WITH DIABETIC AUTONOMIC NEUROPATHY, WITHOUT LONG-TERM CURRENT USE OF INSULIN: ICD-10-CM

## 2023-10-18 DIAGNOSIS — L97.521 ULCER OF LEFT FOOT, LIMITED TO BREAKDOWN OF SKIN: ICD-10-CM

## 2023-10-18 DIAGNOSIS — F33.9 DEPRESSION, RECURRENT: ICD-10-CM

## 2023-10-18 DIAGNOSIS — Z13.31 POSITIVE DEPRESSION SCREENING: ICD-10-CM

## 2023-10-18 DIAGNOSIS — M51.9 LUMBAR DISC DISEASE: ICD-10-CM

## 2023-10-18 DIAGNOSIS — Z00.00 ENCOUNTER FOR INITIAL ANNUAL WELLNESS VISIT (AWV) IN MEDICARE PATIENT: Primary | ICD-10-CM

## 2023-10-18 DIAGNOSIS — E78.2 MIXED HYPERLIPIDEMIA: ICD-10-CM

## 2023-10-18 DIAGNOSIS — I10 ESSENTIAL HYPERTENSION: ICD-10-CM

## 2023-10-18 DIAGNOSIS — Z12.5 ENCOUNTER FOR SCREENING FOR MALIGNANT NEOPLASM OF PROSTATE: ICD-10-CM

## 2023-10-18 LAB
EST. AVERAGE GLUCOSE BLD GHB EST-MCNC: 190 MG/DL
HBA1C MFR BLD HPLC: 8.3 % (ref 4.5–6.6)
PSA SERPL-MCNC: 0.31 NG/ML

## 2023-10-18 PROCEDURE — 83036 HEMOGLOBIN A1C: ICD-10-PCS | Mod: ,,, | Performed by: CLINICAL MEDICAL LABORATORY

## 2023-10-18 PROCEDURE — 3079F DIAST BP 80-89 MM HG: CPT | Mod: ,,,

## 2023-10-18 PROCEDURE — G0439 PR MEDICARE ANNUAL WELLNESS SUBSEQUENT VISIT: ICD-10-PCS | Mod: ,,,

## 2023-10-18 PROCEDURE — 3052F PR MOST RECENT HEMOGLOBIN A1C LEVEL 8.0 - < 9.0%: ICD-10-PCS | Mod: ,,,

## 2023-10-18 PROCEDURE — 3066F NEPHROPATHY DOC TX: CPT | Mod: ,,,

## 2023-10-18 PROCEDURE — 3066F PR DOCUMENTATION OF TREATMENT FOR NEPHROPATHY: ICD-10-PCS | Mod: ,,,

## 2023-10-18 PROCEDURE — 3075F PR MOST RECENT SYSTOLIC BLOOD PRESS GE 130-139MM HG: ICD-10-PCS | Mod: ,,,

## 2023-10-18 PROCEDURE — 3079F PR MOST RECENT DIASTOLIC BLOOD PRESSURE 80-89 MM HG: ICD-10-PCS | Mod: ,,,

## 2023-10-18 PROCEDURE — 3052F HG A1C>EQUAL 8.0%<EQUAL 9.0%: CPT | Mod: ,,,

## 2023-10-18 PROCEDURE — G0439 PPPS, SUBSEQ VISIT: HCPCS | Mod: ,,,

## 2023-10-18 PROCEDURE — 3060F PR POS MICROALBUMINURIA RESULT DOCUMENTED/REVIEW: ICD-10-PCS | Mod: ,,,

## 2023-10-18 PROCEDURE — 83036 HEMOGLOBIN GLYCOSYLATED A1C: CPT | Mod: ,,, | Performed by: CLINICAL MEDICAL LABORATORY

## 2023-10-18 PROCEDURE — 3060F POS MICROALBUMINURIA REV: CPT | Mod: ,,,

## 2023-10-18 PROCEDURE — G0103 PSA, SCREENING: ICD-10-PCS | Mod: ,,, | Performed by: CLINICAL MEDICAL LABORATORY

## 2023-10-18 PROCEDURE — 3075F SYST BP GE 130 - 139MM HG: CPT | Mod: ,,,

## 2023-10-18 PROCEDURE — G0103 PSA SCREENING: HCPCS | Mod: ,,, | Performed by: CLINICAL MEDICAL LABORATORY

## 2023-10-18 NOTE — PATIENT INSTRUCTIONS
Counseling and Referral of Other Preventative  (Italic type indicates deductible and co-insurance are waived)    Patient Name: Bruno Levy  Today's Date: 12/28/2023    Health Maintenance       Date Due Completion Date    Hepatitis C Screening Never done ---    COVID-19 Vaccine (1) Never done ---    HIV Screening Never done ---    Colorectal Cancer Screening Never done ---    Pneumococcal Vaccines (Age 0-64) (2 - PCV) 08/28/2016 8/28/2015    Eye Exam 01/26/2017 1/26/2016    Shingles Vaccine (1 of 2) Never done ---    Hemoglobin A1c 01/18/2024 10/18/2023    Diabetes Urine Screening 03/17/2024 3/17/2023    Foot Exam 03/17/2024 3/17/2023    Lipid Panel 03/17/2024 3/17/2023    PROSTATE-SPECIFIC ANTIGEN 10/18/2024 10/18/2023    Low Dose Statin 11/24/2024 11/24/2023    TETANUS VACCINE 08/04/2033 8/4/2023        Orders Placed This Encounter   Procedures    PSA, Screening    Hemoglobin A1C    Ambulatory referral/consult to Podiatry    Ambulatory referral/consult to Pain Clinic         Counseling and Referral of Other Preventative  (Italic type indicates deductible and co-insurance are waived)    Patient Name: Bruno Levy  Today's Date: 12/28/2023    Health Maintenance       Date Due Completion Date    Hepatitis C Screening Never done ---    COVID-19 Vaccine (1) Never done ---    HIV Screening Never done ---    Colorectal Cancer Screening Never done ---    Pneumococcal Vaccines (Age 0-64) (2 - PCV) 08/28/2016 8/28/2015    Eye Exam 01/26/2017 1/26/2016    Shingles Vaccine (1 of 2) Never done ---    Hemoglobin A1c 01/18/2024 10/18/2023    Diabetes Urine Screening 03/17/2024 3/17/2023    Foot Exam 03/17/2024 3/17/2023    Lipid Panel 03/17/2024 3/17/2023    PROSTATE-SPECIFIC ANTIGEN 10/18/2024 10/18/2023    Low Dose Statin 11/24/2024 11/24/2023    TETANUS VACCINE 08/04/2033 8/4/2023        Orders Placed This Encounter   Procedures    PSA, Screening    Hemoglobin A1C    Ambulatory referral/consult to Podiatry    Ambulatory  referral/consult to Pain Clinic         The following information is provided to all patients.  This information is to help you find resources for any of the problems found today that may be affecting your health:                Living healthy guide: www.[facility].[place].[facility]      Understanding Diabetes: www.diabetes.org      Eating healthy: www.cdc.gov/healthyweight      CDC home safety checklist: www.cdc.gov/steadi/patient.html      Agency on Aging: www.goea.[place].[facility]      Alcoholics anonymous (AA): www.aa.org      Physical Activity: www.iglesia.nih.gov/vc0srla      Tobacco use: www.quitActiveOusla.org     Counseling and Referral of Other Preventative  (Italic type indicates deductible and co-insurance are waived)    Patient Name: Bruno Levy  Today's Date: 12/28/2023    Health Maintenance       Date Due Completion Date    Hepatitis C Screening Never done ---    COVID-19 Vaccine (1) Never done ---    HIV Screening Never done ---    Colorectal Cancer Screening Never done ---    Pneumococcal Vaccines (Age 0-64) (2 - PCV) 08/28/2016 8/28/2015    Eye Exam 01/26/2017 1/26/2016    Shingles Vaccine (1 of 2) Never done ---    Hemoglobin A1c 01/18/2024 10/18/2023    Diabetes Urine Screening 03/17/2024 3/17/2023    Foot Exam 03/17/2024 3/17/2023    Lipid Panel 03/17/2024 3/17/2023    PROSTATE-SPECIFIC ANTIGEN 10/18/2024 10/18/2023    Low Dose Statin 11/24/2024 11/24/2023    TETANUS VACCINE 08/04/2033 8/4/2023        Orders Placed This Encounter   Procedures    PSA, Screening    Hemoglobin A1C    Ambulatory referral/consult to Podiatry    Ambulatory referral/consult to Pain Clinic       The following information is provided to all patients.  This information is to help you find resources for any of the problems found today that may be affecting your health:                Living healthy guide: www.[facility].[place].[facility]      Understanding Diabetes: www.diabetes.org      Eating healthy: www.cdc.gov/healthyweight      CDC home safety  checklist: www.cdc.gov/steadi/patient.html      Agency on Aging: www.goea.[place].gov      Alcoholics anonymous (AA): www.aa.org      Physical Activity: www.iglesia.nih.gov/mg8upgm      Tobacco use: www.quitwithusla.org

## 2023-10-18 NOTE — PROGRESS NOTES
I have reviewed the patient's positive depression score. Pt's depression is currently controlled well with current medication. He denies any thoughts of suicide or self harm. He is to RTC as needed

## 2023-10-20 NOTE — PROGRESS NOTES
Patient notified of results and instructions with understanding verbalized.  Patient reports that he is taking the Metformin as prescribed but has been eating cookies and ice cream over the past 3 months.  He would like to stop the sweets and see what his A1C is in 3 months.  If it is still elevated then he is open to adding another medication.

## 2023-10-23 NOTE — PROGRESS NOTES
Strict diet for the next 3 months and increase exercise 4-5 times weekly. Follow up 3 months and will recheck labs.  RTC if needed before next appt.

## 2023-10-25 ENCOUNTER — PATIENT MESSAGE (OUTPATIENT)
Dept: FAMILY MEDICINE | Facility: CLINIC | Age: 53
End: 2023-10-25
Payer: MEDICARE

## 2023-10-25 DIAGNOSIS — E11.43 TYPE 2 DIABETES MELLITUS WITH DIABETIC AUTONOMIC NEUROPATHY, WITHOUT LONG-TERM CURRENT USE OF INSULIN: Primary | ICD-10-CM

## 2023-10-25 RX ORDER — SEMAGLUTIDE 0.68 MG/ML
INJECTION, SOLUTION SUBCUTANEOUS
Qty: 2 EACH | Refills: 0 | Status: SHIPPED | OUTPATIENT
Start: 2023-10-25 | End: 2023-10-25 | Stop reason: CLARIF

## 2023-10-25 RX ORDER — GLIMEPIRIDE 2 MG/1
2 TABLET ORAL
Qty: 90 TABLET | Refills: 0 | Status: SHIPPED | OUTPATIENT
Start: 2023-10-25 | End: 2024-01-18 | Stop reason: SDUPTHER

## 2023-10-25 NOTE — PROGRESS NOTES
Phoned patient to inform him of instructions.  He reports that after cutting out sweets and fasting for 8 hours that his blood sugar is around 200.  He has decided that he is ready to add an additional medication for his blood sugar at this time.  He would like a 90 day supply of the medication sent to [facility].

## 2023-11-02 ENCOUNTER — PATIENT MESSAGE (OUTPATIENT)
Dept: FAMILY MEDICINE | Facility: CLINIC | Age: 53
End: 2023-11-02
Payer: MEDICARE

## 2023-11-02 DIAGNOSIS — E11.43 TYPE 2 DIABETES MELLITUS WITH DIABETIC AUTONOMIC NEUROPATHY, WITHOUT LONG-TERM CURRENT USE OF INSULIN: Primary | ICD-10-CM

## 2023-11-08 RX ORDER — DEXTROSE 4 G
1 TABLET,CHEWABLE ORAL
Qty: 1 EACH | Refills: 0 | Status: SHIPPED | OUTPATIENT
Start: 2023-11-08

## 2023-11-08 RX ORDER — LANCETS 33 GAUGE
1 EACH MISCELLANEOUS
Qty: 100 EACH | Refills: 0 | Status: SHIPPED | OUTPATIENT
Start: 2023-11-08

## 2023-11-24 PROBLEM — R21 RASH: Status: ACTIVE | Noted: 2023-11-24

## 2023-11-24 NOTE — ASSESSMENT & PLAN NOTE
Healing puncture wound noted. Erythema and edema still noted around wound. Lincocin IM today. Clindamycin RX. Medication instructions and education given  Follow up 1 week.

## 2024-01-18 ENCOUNTER — OFFICE VISIT (OUTPATIENT)
Dept: FAMILY MEDICINE | Facility: CLINIC | Age: 54
End: 2024-01-18
Payer: MEDICARE

## 2024-01-18 VITALS
OXYGEN SATURATION: 97 % | TEMPERATURE: 98 F | HEIGHT: 70 IN | HEART RATE: 83 BPM | BODY MASS INDEX: 29.97 KG/M2 | DIASTOLIC BLOOD PRESSURE: 90 MMHG | WEIGHT: 209.38 LBS | RESPIRATION RATE: 20 BRPM | SYSTOLIC BLOOD PRESSURE: 128 MMHG

## 2024-01-18 DIAGNOSIS — I10 ESSENTIAL HYPERTENSION: ICD-10-CM

## 2024-01-18 DIAGNOSIS — E78.2 MIXED HYPERLIPIDEMIA: ICD-10-CM

## 2024-01-18 DIAGNOSIS — Z13.31 POSITIVE DEPRESSION SCREENING: ICD-10-CM

## 2024-01-18 DIAGNOSIS — Z11.59 ENCOUNTER FOR HEPATITIS C SCREENING TEST FOR LOW RISK PATIENT: ICD-10-CM

## 2024-01-18 DIAGNOSIS — R42 VERTIGO: ICD-10-CM

## 2024-01-18 DIAGNOSIS — E11.43 TYPE 2 DIABETES MELLITUS WITH DIABETIC AUTONOMIC NEUROPATHY, WITHOUT LONG-TERM CURRENT USE OF INSULIN: Primary | ICD-10-CM

## 2024-01-18 LAB
ALBUMIN SERPL BCP-MCNC: 4.2 G/DL (ref 3.5–5)
ALBUMIN/GLOB SERPL: 1.1 {RATIO}
ALP SERPL-CCNC: 96 U/L (ref 45–115)
ALT SERPL W P-5'-P-CCNC: 55 U/L (ref 16–61)
ANION GAP SERPL CALCULATED.3IONS-SCNC: 6 MMOL/L (ref 7–16)
AST SERPL W P-5'-P-CCNC: 22 U/L (ref 15–37)
BASOPHILS # BLD AUTO: 0.08 K/UL (ref 0–0.2)
BASOPHILS NFR BLD AUTO: 0.8 % (ref 0–1)
BILIRUB SERPL-MCNC: 0.5 MG/DL (ref ?–1.2)
BUN SERPL-MCNC: 13 MG/DL (ref 7–18)
BUN/CREAT SERPL: 16 (ref 6–20)
CALCIUM SERPL-MCNC: 10 MG/DL (ref 8.5–10.1)
CHLORIDE SERPL-SCNC: 107 MMOL/L (ref 98–107)
CHOLEST SERPL-MCNC: 122 MG/DL (ref 0–200)
CHOLEST/HDLC SERPL: 3 {RATIO}
CO2 SERPL-SCNC: 31 MMOL/L (ref 21–32)
CREAT SERPL-MCNC: 0.82 MG/DL (ref 0.7–1.3)
DIFFERENTIAL METHOD BLD: ABNORMAL
EGFR (NO RACE VARIABLE) (RUSH/TITUS): 105 ML/MIN/1.73M2
EOSINOPHIL # BLD AUTO: 0.25 K/UL (ref 0–0.5)
EOSINOPHIL NFR BLD AUTO: 2.5 % (ref 1–4)
ERYTHROCYTE [DISTWIDTH] IN BLOOD BY AUTOMATED COUNT: 13.3 % (ref 11.5–14.5)
EST. AVERAGE GLUCOSE BLD GHB EST-MCNC: 157 MG/DL
GLOBULIN SER-MCNC: 3.8 G/DL (ref 2–4)
GLUCOSE SERPL-MCNC: 115 MG/DL (ref 74–106)
HBA1C MFR BLD HPLC: 7.1 % (ref 4.5–6.6)
HCT VFR BLD AUTO: 45.7 % (ref 40–54)
HCV AB SER QL: NORMAL
HDLC SERPL-MCNC: 41 MG/DL (ref 40–60)
HGB BLD-MCNC: 15.2 G/DL (ref 13.5–18)
IMM GRANULOCYTES # BLD AUTO: 0.04 K/UL (ref 0–0.04)
IMM GRANULOCYTES NFR BLD: 0.4 % (ref 0–0.4)
LDLC SERPL CALC-MCNC: 55 MG/DL
LDLC/HDLC SERPL: 1.3 {RATIO}
LYMPHOCYTES # BLD AUTO: 3.13 K/UL (ref 1–4.8)
LYMPHOCYTES NFR BLD AUTO: 31.1 % (ref 27–41)
MCH RBC QN AUTO: 32 PG (ref 27–31)
MCHC RBC AUTO-ENTMCNC: 33.3 G/DL (ref 32–36)
MCV RBC AUTO: 96.2 FL (ref 80–96)
MONOCYTES # BLD AUTO: 0.79 K/UL (ref 0–0.8)
MONOCYTES NFR BLD AUTO: 7.9 % (ref 2–6)
MPC BLD CALC-MCNC: 11.5 FL (ref 9.4–12.4)
NEUTROPHILS # BLD AUTO: 5.76 K/UL (ref 1.8–7.7)
NEUTROPHILS NFR BLD AUTO: 57.3 % (ref 53–65)
NONHDLC SERPL-MCNC: 81 MG/DL
NRBC # BLD AUTO: 0 X10E3/UL
NRBC, AUTO (.00): 0 %
PLATELET # BLD AUTO: 237 K/UL (ref 150–400)
POTASSIUM SERPL-SCNC: 4.4 MMOL/L (ref 3.5–5.1)
PROT SERPL-MCNC: 8 G/DL (ref 6.4–8.2)
RBC # BLD AUTO: 4.75 M/UL (ref 4.6–6.2)
SODIUM SERPL-SCNC: 140 MMOL/L (ref 136–145)
TRIGL SERPL-MCNC: 129 MG/DL (ref 35–150)
VLDLC SERPL-MCNC: 26 MG/DL
WBC # BLD AUTO: 10.05 K/UL (ref 4.5–11)

## 2024-01-18 PROCEDURE — 1159F MED LIST DOCD IN RCRD: CPT | Mod: ,,,

## 2024-01-18 PROCEDURE — 3008F BODY MASS INDEX DOCD: CPT | Mod: ,,,

## 2024-01-18 PROCEDURE — 3051F HG A1C>EQUAL 7.0%<8.0%: CPT | Mod: ,,,

## 2024-01-18 PROCEDURE — 1160F RVW MEDS BY RX/DR IN RCRD: CPT | Mod: ,,,

## 2024-01-18 PROCEDURE — 99214 OFFICE O/P EST MOD 30 MIN: CPT | Mod: ,,,

## 2024-01-18 PROCEDURE — 36415 COLL VENOUS BLD VENIPUNCTURE: CPT

## 2024-01-18 PROCEDURE — 85025 COMPLETE CBC W/AUTO DIFF WBC: CPT | Mod: ,,, | Performed by: CLINICAL MEDICAL LABORATORY

## 2024-01-18 PROCEDURE — 3080F DIAST BP >= 90 MM HG: CPT | Mod: ,,,

## 2024-01-18 PROCEDURE — 80053 COMPREHEN METABOLIC PANEL: CPT | Mod: ,,, | Performed by: CLINICAL MEDICAL LABORATORY

## 2024-01-18 PROCEDURE — 80061 LIPID PANEL: CPT | Mod: ,,, | Performed by: CLINICAL MEDICAL LABORATORY

## 2024-01-18 PROCEDURE — 83036 HEMOGLOBIN GLYCOSYLATED A1C: CPT | Mod: ,,, | Performed by: CLINICAL MEDICAL LABORATORY

## 2024-01-18 PROCEDURE — 3074F SYST BP LT 130 MM HG: CPT | Mod: ,,,

## 2024-01-18 PROCEDURE — 86803 HEPATITIS C AB TEST: CPT | Mod: ,,, | Performed by: CLINICAL MEDICAL LABORATORY

## 2024-01-18 RX ORDER — AMLODIPINE BESYLATE 5 MG/1
10 TABLET ORAL DAILY
Qty: 180 TABLET | Refills: 1 | Status: SHIPPED | OUTPATIENT
Start: 2024-01-18

## 2024-01-18 RX ORDER — PREGABALIN 100 MG/1
100 CAPSULE ORAL EVERY 6 HOURS
COMMUNITY
Start: 2024-01-02 | End: 2024-04-22

## 2024-01-18 RX ORDER — GLIMEPIRIDE 2 MG/1
2 TABLET ORAL
Qty: 90 TABLET | Refills: 0 | Status: SHIPPED | OUTPATIENT
Start: 2024-01-18 | End: 2024-04-16 | Stop reason: SDUPTHER

## 2024-01-18 RX ORDER — ATORVASTATIN CALCIUM 40 MG/1
40 TABLET, FILM COATED ORAL DAILY
Qty: 90 TABLET | Refills: 3 | Status: SHIPPED | OUTPATIENT
Start: 2024-01-18 | End: 2025-01-17

## 2024-01-18 RX ORDER — MECLIZINE HYDROCHLORIDE 25 MG/1
25 TABLET ORAL 3 TIMES DAILY PRN
Qty: 90 TABLET | Refills: 0 | Status: SHIPPED | OUTPATIENT
Start: 2024-01-18

## 2024-01-18 NOTE — PROGRESS NOTES
I went over care gaps with patient. He has an eye exam coming up on Monday 1/22/24 at [facility]. He states he does not want the covid vaccine, pneumonia vaccine or shingles vaccine. He refuses to schedule a colonoscopy.

## 2024-01-18 NOTE — PROGRESS NOTES
BEATRICE PARKS, HAYLEY   [facility]  [address]  [place]  [address]      PATIENT NAME: Bruno Levy  : 1970  DATE: 24  MRN: [id number]        Reason for Visit / Chief Complaint: Headache (Patient states that he has been getting headaches in the mornings. He states he gets up then 20 to 30 minutes later his head starts hurting, he feels hot and gets dizzy and nauseated. He says this has been going on x 1 year. Patients states he has checked Blood sugars at this time and they are normal to slightly elevated.) and Medication Refill (Patient states he needs some medications refilled and bloodwork.)         History of Present Illness / Problem Focused Workflow     52 y/o male presents to clinic with complaints of Headache (Patient states that he has been getting headaches in the mornings. He states he gets up then 20 to 30 minutes later his head starts hurting, he feels hot and gets dizzy and nauseated. He says this has been going on x 1 year. Patients states he has checked Blood sugars at this time and they are normal to slightly elevated.) and Medication Refill (Patient states he needs some medications refilled and bloodwork.    Review of Systems     @Review of Systems   Constitutional:  Negative for chills, fatigue and fever.   HENT:  Negative for nasal congestion, ear discharge, ear pain, rhinorrhea, sinus pressure/congestion and sore throat.    Respiratory:  Negative for cough, chest tightness, shortness of breath, wheezing and stridor.    Cardiovascular:  Negative for palpitations and claudication.   Gastrointestinal:  Negative for abdominal pain, constipation, diarrhea, nausea, vomiting and reflux.   Genitourinary:  Negative for dysuria, flank pain, frequency, hematuria and urgency.   Musculoskeletal:  Negative for myalgias.   Neurological:  Negative for dizziness, weakness, light-headedness and headaches.   Psychiatric/Behavioral:  Negative for suicidal ideas.        Medical /  Social / Family History     Past Medical History:   Diagnosis Date    Depression     Diabetes mellitus, type 2     History of methicillin resistant staphylococcus aureus (MRSA)     Hyperlipidemia     Hypertension     Neuropathy     Osteomyelitis        Past Surgical History:   Procedure Laterality Date    FRACTURE SURGERY  1976    arm    LUMBAR FUSION      NASAL SEPTUM SURGERY      SPINAL CORD STIMULATOR IMPLANT      SPINAL CORD STIMULATOR REMOVAL      TOE AMPUTATION      rt foot great to and 5th toe;lt foot great,4th and 5th toes.           Medications and Allergies     Medications  Outpatient Medications Marked as Taking for the 1/18/24 encounter (Office Visit) with Franklyn Acosta FNP   Medication Sig Dispense Refill    baclofen (LIORESAL) 20 MG tablet Take 20 mg by mouth every 6 (six) hours as needed.      blood sugar diagnostic Strp 1 strip by Misc.(Non-Drug; Combo Route) route once daily. 100 each 1    blood-glucose meter (TRUE METRIX AIR GLUCOSE METER) Misc 1 each by Other route 4 (four) times daily before meals and nightly. 1 each 0    gabapentin (NEURONTIN) 600 MG tablet 1.5 tablets every 6 hours      lancets (TRUEPLUS LANCETS) 33 gauge Misc 1 lancet  by Other route before meals, at bedtime and at 0200. 100 each 0    metFORMIN (GLUCOPHAGE-XR) 500 MG ER 24hr tablet TAKE 2 TABLETS TWICE DAILY WITH MEALS (Patient taking differently: 500 mg 2 (two) times daily with meals. TAKE 2 TABLETS TWICE DAILY WITH MEALS) 360 tablet 1    oxyCODONE-acetaminophen (PERCOCET) 7.5-325 mg per tablet Take 1 tablet by mouth every 8 (eight) hours as needed.      [DISCONTINUED] amLODIPine (NORVASC) 5 MG tablet Take 2 tablets (10 mg total) by mouth once daily. 180 tablet 1    [DISCONTINUED] atorvastatin (LIPITOR) 40 MG tablet Take 1 tablet (40 mg total) by mouth once daily. 90 tablet 3    [DISCONTINUED] glimepiride (AMARYL) 2 MG tablet Take 1 tablet (2 mg total) by mouth before breakfast. 90 tablet 0       Allergies  Review of  patient's allergies indicates:   Allergen Reactions    Levofloxacin (bulk) Other (See Comments)     Worsened muscle spasms       Physical Examination     Vitals:    01/18/24 0948   BP: (!) 128/90   Pulse: 83   Resp: 20   Temp: 98.1 °F (36.7 °C)     Physical Exam  Vitals and nursing note reviewed.   Constitutional:       General: He is awake.      Appearance: Normal appearance.   HENT:      Head: Normocephalic.      Right Ear: Tympanic membrane, ear canal and external ear normal.      Left Ear: Tympanic membrane, ear canal and external ear normal.      Nose: Nose normal.      Mouth/Throat:      Lips: Pink.      Mouth: Mucous membranes are moist.      Pharynx: Oropharynx is clear. Uvula midline.   Cardiovascular:      Rate and Rhythm: Normal rate and regular rhythm.      Heart sounds: Normal heart sounds, S1 normal and S2 normal.   Pulmonary:      Effort: Pulmonary effort is normal. No respiratory distress.      Breath sounds: Normal breath sounds. No decreased breath sounds, wheezing, rhonchi or rales.   Abdominal:      General: Bowel sounds are normal.      Palpations: Abdomen is soft.      Tenderness: There is no abdominal tenderness.   Musculoskeletal:      Cervical back: Normal range of motion.   Skin:     General: Skin is warm.      Capillary Refill: Capillary refill takes less than 2 seconds.   Neurological:      Mental Status: He is alert and oriented to person, place, and time.      Cranial Nerves: Cranial nerves 2-12 are intact.      Motor: Motor function is intact.      Coordination: Coordination is intact.      Gait: Gait is intact.   Psychiatric:         Thought Content: Thought content does not include homicidal or suicidal ideation. Thought content does not include homicidal or suicidal plan.               Procedures   Assessment and Plan (including Health Maintenance)   :    Plan:     Problem List Items Addressed This Visit          ENT    Vertigo    Current Assessment & Plan     Meclizine RX today.  Medication instructions and education given with understanding voiced         Relevant Medications    meclizine (ANTIVERT) 25 mg tablet       Cardiac/Vascular    Essential hypertension    Current Assessment & Plan     Hypertension well controlled no change in medication.  Follow-up in 3 months.  Will check CBC and CMP yearly.  Goal is blood pressure 120/70         Relevant Medications    amLODIPine (NORVASC) 5 MG tablet    Other Relevant Orders    CBC Auto Differential (Completed)    Comprehensive Metabolic Panel (Completed)    Hyperlipidemia    Current Assessment & Plan     Last cholesterol was 183 and LDL was 100 in March of 2023.  Continue atorvastatin 40 daily.  Recheck level         Relevant Medications    atorvastatin (LIPITOR) 40 MG tablet    Other Relevant Orders    Lipid Panel (Completed)       ID    Encounter for hepatitis C screening test for low risk patient    Current Assessment & Plan     Lab work today and will call pt with results and any new orders         Relevant Orders    Hepatitis C antibody (Completed)       Endocrine    Type 2 diabetes mellitus with neurologic complication, without long-term current use of insulin - Primary    Current Assessment & Plan     Last A1c was 6.4 in March of 2023.  Continue metformin 500 once daily.  Discussed diet.  Exercise 5 days per week.  Follow-up every 3 months.  Hemoglobin A1c lipid and BMP at next visit.         Relevant Medications    glimepiride (AMARYL) 2 MG tablet    Other Relevant Orders    Hemoglobin A1C (Completed)       Other    Positive depression screening    Overview     I have reviewed the positive depression score which warrants active treatment with psychotherapy and/or medications.            Health Maintenance Topics with due status: Not Due       Topic Last Completion Date    Diabetes Urine Screening 03/17/2023    TETANUS VACCINE 08/04/2023    PROSTATE-SPECIFIC ANTIGEN 10/18/2023    Low Dose Statin 01/18/2024    Lipid Panel 01/18/2024     Hemoglobin A1c 01/18/2024       Future Appointments   Date Time Provider Department Center   11/14/2024  1:00 PM AW[facility] NURSE [facility] [place]        Health Maintenance Due   Topic Date Due    COVID-19 Vaccine (1) Never done    HIV Screening  Never done    Colorectal Cancer Screening  Never done    Pneumococcal Vaccines (Age 0-64) (2 of 2 - PCV) 08/28/2016    Eye Exam  01/26/2017    Shingles Vaccine (1 of 2) Never done    Foot Exam  03/17/2024        No follow-ups on file.     Signature:  HAYLEY RIOS  [facility]  [address] [address] [place]  [address]    Date of encounter: 1/18/24    I have used clinical judgement based on duration and functional status to consider definite necessity for treatment.

## 2024-01-30 ENCOUNTER — PATIENT MESSAGE (OUTPATIENT)
Dept: FAMILY MEDICINE | Facility: CLINIC | Age: 54
End: 2024-01-30
Payer: MEDICARE

## 2024-02-02 PROBLEM — Z11.59 ENCOUNTER FOR HEPATITIS C SCREENING TEST FOR LOW RISK PATIENT: Status: ACTIVE | Noted: 2024-02-02

## 2024-02-02 PROBLEM — R42 VERTIGO: Status: ACTIVE | Noted: 2024-02-02

## 2024-02-02 PROBLEM — Z13.31 POSITIVE DEPRESSION SCREENING: Status: ACTIVE | Noted: 2024-02-02

## 2024-02-28 ENCOUNTER — OFFICE VISIT (OUTPATIENT)
Dept: OTOLARYNGOLOGY | Facility: CLINIC | Age: 54
End: 2024-02-28
Payer: MEDICARE

## 2024-02-28 ENCOUNTER — CLINICAL SUPPORT (OUTPATIENT)
Dept: AUDIOLOGY | Facility: CLINIC | Age: 54
End: 2024-02-28
Payer: MEDICARE

## 2024-02-28 VITALS — HEIGHT: 70 IN | BODY MASS INDEX: 29.92 KG/M2 | WEIGHT: 209 LBS

## 2024-02-28 DIAGNOSIS — H90.3 SENSORINEURAL HEARING LOSS (SNHL) OF BOTH EARS: ICD-10-CM

## 2024-02-28 DIAGNOSIS — H93.13 SUBJECTIVE TINNITUS, BILATERAL: ICD-10-CM

## 2024-02-28 DIAGNOSIS — H90.3 SENSORINEURAL HEARING LOSS, BILATERAL: Primary | ICD-10-CM

## 2024-02-28 DIAGNOSIS — H93.19 TINNITUS, UNSPECIFIED LATERALITY: Primary | ICD-10-CM

## 2024-02-28 PROCEDURE — 1160F RVW MEDS BY RX/DR IN RCRD: CPT | Mod: CPTII,,, | Performed by: OTOLARYNGOLOGY

## 2024-02-28 PROCEDURE — 99212 OFFICE O/P EST SF 10 MIN: CPT | Mod: PBBFAC,25 | Performed by: AUDIOLOGIST

## 2024-02-28 PROCEDURE — 1159F MED LIST DOCD IN RCRD: CPT | Mod: CPTII,,, | Performed by: OTOLARYNGOLOGY

## 2024-02-28 PROCEDURE — 99213 OFFICE O/P EST LOW 20 MIN: CPT | Mod: PBBFAC,25 | Performed by: OTOLARYNGOLOGY

## 2024-02-28 PROCEDURE — 92557 COMPREHENSIVE HEARING TEST: CPT | Mod: PBBFAC | Performed by: AUDIOLOGIST

## 2024-02-28 PROCEDURE — 99214 OFFICE O/P EST MOD 30 MIN: CPT | Mod: S$PBB,,, | Performed by: OTOLARYNGOLOGY

## 2024-02-28 PROCEDURE — 3051F HG A1C>EQUAL 7.0%<8.0%: CPT | Mod: CPTII,,, | Performed by: OTOLARYNGOLOGY

## 2024-02-28 PROCEDURE — 3008F BODY MASS INDEX DOCD: CPT | Mod: CPTII,,, | Performed by: OTOLARYNGOLOGY

## 2024-02-28 NOTE — PROGRESS NOTES
Impression: Presbyopia: H52.4. Plan: Refractive error accounts for symptoms. Release SRX. RTC 6-8 months x CFM. Subjective:       Patient ID: Bruno Levy is a 53 y.o. male.    Chief Complaint: Tinnitus (Bilateral ears. Pt states the ringing in his ears are getting worse. Hearing test done. )    Ringing in Ears:    Associated symptoms: Tinnitus.      Review of Systems   HENT:  Positive for hearing loss and tinnitus.    All other systems reviewed and are negative.      Objective:      Physical Exam  General: NAD  Head: Normocephalic, atraumatic, no facial asymmetry/normal strength,  Ears: Both auricules normal in appearance, w/o deformities tympanic membranes normal external auditory canals normal  Nose: External nose w/o deformities normal turbinates no drainage or inflammation  Oral Cavity: Lips, gums, floor of mouth, tongue hard palate, and buccal mucosa without mass/lesion  Oropharynx: Mucosa pink and moist, soft palate, posterior pharynx and oropharyngeal wall without mass/lesion  Neck: Supple, symmetric, trachea midline, no palpable mass/lesion, no palpable cervical lymphadenopathy  Skin: Warm and dry, no concerning lesions  Respiratory: Respirations even, unlabored  Assessment:       1. Tinnitus, unspecified laterality    2. Sensorineural hearing loss (SNHL) of both ears        Plan:       Discussed audio in detail with interpretation   Rec hearing aids

## 2024-03-09 DIAGNOSIS — Z71.89 COMPLEX CARE COORDINATION: ICD-10-CM

## 2024-04-16 DIAGNOSIS — E11.43 TYPE 2 DIABETES MELLITUS WITH DIABETIC AUTONOMIC NEUROPATHY, WITHOUT LONG-TERM CURRENT USE OF INSULIN: ICD-10-CM

## 2024-04-16 RX ORDER — GLIMEPIRIDE 2 MG/1
2 TABLET ORAL
Qty: 90 TABLET | Refills: 0 | Status: SHIPPED | OUTPATIENT
Start: 2024-04-16

## 2024-04-17 ENCOUNTER — PATIENT MESSAGE (OUTPATIENT)
Dept: FAMILY MEDICINE | Facility: CLINIC | Age: 54
End: 2024-04-17
Payer: MEDICARE

## 2024-04-17 NOTE — TELEPHONE ENCOUNTER
Please see the attached refill request. We received this request by mistake. Pt saw you in 01/2024. If there is anything I can do to help please let me know.

## 2024-04-18 RX ORDER — METFORMIN HYDROCHLORIDE 500 MG/1
TABLET, EXTENDED RELEASE ORAL
Qty: 360 TABLET | Refills: 1 | Status: SHIPPED | OUTPATIENT
Start: 2024-04-18

## 2024-04-22 ENCOUNTER — OFFICE VISIT (OUTPATIENT)
Dept: FAMILY MEDICINE | Facility: CLINIC | Age: 54
End: 2024-04-22
Payer: MEDICARE

## 2024-04-22 VITALS
BODY MASS INDEX: 29.44 KG/M2 | DIASTOLIC BLOOD PRESSURE: 89 MMHG | WEIGHT: 205.63 LBS | OXYGEN SATURATION: 97 % | RESPIRATION RATE: 19 BRPM | HEART RATE: 90 BPM | TEMPERATURE: 98 F | HEIGHT: 70 IN | SYSTOLIC BLOOD PRESSURE: 131 MMHG

## 2024-04-22 DIAGNOSIS — L97.521 ULCER OF LEFT FOOT, LIMITED TO BREAKDOWN OF SKIN: ICD-10-CM

## 2024-04-22 DIAGNOSIS — G63 VITAMIN B12 DEFICIENCY NEUROPATHY: ICD-10-CM

## 2024-04-22 DIAGNOSIS — E53.8 VITAMIN B12 DEFICIENCY NEUROPATHY: ICD-10-CM

## 2024-04-22 DIAGNOSIS — F33.9 DEPRESSION, RECURRENT: ICD-10-CM

## 2024-04-22 DIAGNOSIS — Z89.422 ACQUIRED ABSENCE OF OTHER LEFT TOE(S): ICD-10-CM

## 2024-04-22 DIAGNOSIS — L98.9 DISORDER OF SKIN AND SUBCUTANEOUS TISSUE: ICD-10-CM

## 2024-04-22 DIAGNOSIS — F51.01 PRIMARY INSOMNIA: ICD-10-CM

## 2024-04-22 DIAGNOSIS — I73.9 PERIPHERAL VASCULAR DISEASE, UNSPECIFIED: ICD-10-CM

## 2024-04-22 DIAGNOSIS — E11.43 TYPE 2 DIABETES MELLITUS WITH DIABETIC AUTONOMIC NEUROPATHY, WITHOUT LONG-TERM CURRENT USE OF INSULIN: Primary | ICD-10-CM

## 2024-04-22 PROCEDURE — 3075F SYST BP GE 130 - 139MM HG: CPT | Mod: ,,,

## 2024-04-22 PROCEDURE — 1159F MED LIST DOCD IN RCRD: CPT | Mod: ,,,

## 2024-04-22 PROCEDURE — 3044F HG A1C LEVEL LT 7.0%: CPT | Mod: ,,,

## 2024-04-22 PROCEDURE — 3060F POS MICROALBUMINURIA REV: CPT | Mod: ,,,

## 2024-04-22 PROCEDURE — 3079F DIAST BP 80-89 MM HG: CPT | Mod: ,,,

## 2024-04-22 PROCEDURE — 3066F NEPHROPATHY DOC TX: CPT | Mod: ,,,

## 2024-04-22 PROCEDURE — 3008F BODY MASS INDEX DOCD: CPT | Mod: ,,,

## 2024-04-22 PROCEDURE — 99213 OFFICE O/P EST LOW 20 MIN: CPT | Mod: ,,,

## 2024-04-22 PROCEDURE — 1160F RVW MEDS BY RX/DR IN RCRD: CPT | Mod: ,,,

## 2024-04-22 RX ORDER — CLINDAMYCIN HYDROCHLORIDE 300 MG/1
300 CAPSULE ORAL EVERY 8 HOURS
Qty: 30 CAPSULE | Refills: 0 | Status: SHIPPED | OUTPATIENT
Start: 2024-04-22 | End: 2024-05-02

## 2024-04-22 RX ORDER — TRAZODONE HYDROCHLORIDE 50 MG/1
50 TABLET ORAL NIGHTLY
Qty: 30 TABLET | Refills: 0 | Status: SHIPPED | OUTPATIENT
Start: 2024-04-22 | End: 2024-05-10 | Stop reason: CLARIF

## 2024-04-22 NOTE — PROGRESS NOTES
BEATRICE PARKS, HAYLEY   [facility]  [address]  [place]  [address]      PATIENT NAME: Bruno Levy  : 1970  DATE: 24  MRN: [id number]        Reason for Visit / Chief Complaint: Follow-up (Bruno Levy presents for A1c/ Diabetic check. Patient reports he is going to have his left foot operated on this week to remove the bone where the wound is due to infection of bone. Patient is coming back in AM for labs so he will be fasting.), Health Maintenance (HIV Screening Never done/Colorectal Cancer Screening Never done/Pneumococcal Vaccines (Age 0-64)(2 of 2 - PCV) due on 2016/Shingles Vaccine(1 of 2) Never done/COVID-19 Vaccine(1 -  season) Never done/Foot Exam due on 2024/Diabetes Urine Screening due on 2024/Hemoglobin A1c due on 2024  Patient declined these care gaps at this time.), and Insomnia (Reports inability to sleep and would like to know if there is anything to help his mind calm down at night so he can rest.)         History of Present Illness / Problem Focused Workflow     54 y/o male presents to clinic for follow up and A1C check. He is also wanting to discuss something to help him sleep. States he has only tried OTC sleep medication in the past and this does not help him stay asleep. Pt states he has been having a couple risen on his buttocks that has come and gone for the last few months. States he will pop it and then it will come back.  He has one now on left buttocks that he popped and has returned. He denies any pain and denies putting any medication on these areas      Review of Systems     @Review of Systems   Constitutional:  Negative for chills, fatigue and fever.   HENT:  Negative for nasal congestion, ear discharge, ear pain, rhinorrhea, sinus pressure/congestion and sore throat.    Respiratory:  Negative for cough, chest tightness, shortness of breath, wheezing and stridor.    Cardiovascular:  Negative for palpitations and  claudication.   Gastrointestinal:  Negative for abdominal pain, constipation, diarrhea, nausea, vomiting and reflux.   Genitourinary:  Negative for dysuria, flank pain, frequency, hematuria and urgency.   Musculoskeletal:  Negative for myalgias.   Integumentary:  Positive for rash. Negative for pallor.   Neurological:  Positive for tremors. Negative for dizziness, seizures, speech difficulty, weakness, light-headedness and headaches.   Psychiatric/Behavioral:  Positive for sleep disturbance. Negative for confusion and suicidal ideas. The patient is not nervous/anxious.        Medical / Social / Family History     Past Medical History:   Diagnosis Date    Depression     Diabetes mellitus, type 2     History of methicillin resistant staphylococcus aureus (MRSA)     Hyperlipidemia     Hypertension     Neuropathy     Osteomyelitis        Past Surgical History:   Procedure Laterality Date    FRACTURE SURGERY  1976    arm    LUMBAR FUSION      NASAL SEPTUM SURGERY      SPINAL CORD STIMULATOR IMPLANT      SPINAL CORD STIMULATOR REMOVAL      TOE AMPUTATION      rt foot great to and 5th toe;lt foot great,4th and 5th toes.           Medications and Allergies     Medications  Outpatient Medications Marked as Taking for the 4/22/24 encounter (Office Visit) with Franklyn Acosta FNP   Medication Sig Dispense Refill    amLODIPine (NORVASC) 5 MG tablet Take 2 tablets (10 mg total) by mouth once daily. 180 tablet 1    atorvastatin (LIPITOR) 40 MG tablet Take 1 tablet (40 mg total) by mouth once daily. 90 tablet 3    baclofen (LIORESAL) 20 MG tablet Take 20 mg by mouth every 6 (six) hours as needed.      blood sugar diagnostic Strp 1 strip by Misc.(Non-Drug; Combo Route) route once daily. 100 each 1    blood-glucose meter (TRUE METRIX AIR GLUCOSE METER) [facility] 1 each by Other route 4 (four) times daily before meals and nightly. 1 each 0    gabapentin (NEURONTIN) 600 MG tablet 1.5 tablets every 6 hours      glimepiride (AMARYL) 2 MG  tablet Take 1 tablet (2 mg total) by mouth before breakfast. 90 tablet 0    lancets (TRUEPLUS LANCETS) 33 gauge Misc 1 lancet  by Other route before meals, at bedtime and at 0200. 100 each 0    meclizine (ANTIVERT) 25 mg tablet Take 1 tablet (25 mg total) by mouth 3 (three) times daily as needed for Dizziness. 90 tablet 0    metFORMIN (GLUCOPHAGE-XR) 500 MG ER 24hr tablet TAKE 2 TABLETS TWICE DAILY WITH MEALS 360 tablet 1    oxyCODONE-acetaminophen (PERCOCET) 7.5-325 mg per tablet Take 1 tablet by mouth every 8 (eight) hours as needed.         Allergies  Review of patient's allergies indicates:   Allergen Reactions    Levofloxacin (bulk) Other (See Comments)     Worsened muscle spasms       Physical Examination     Vitals:    04/22/24 1125   BP: 131/89   Pulse: 90   Resp: 19   Temp: 98 °F (36.7 °C)     Physical Exam  Vitals and nursing note reviewed.   Constitutional:       General: He is awake.      Appearance: Normal appearance.   HENT:      Head: Normocephalic.      Right Ear: Tympanic membrane, ear canal and external ear normal.      Left Ear: Tympanic membrane, ear canal and external ear normal.      Nose: Nose normal.      Mouth/Throat:      Lips: Pink.      Mouth: Mucous membranes are moist.      Pharynx: Oropharynx is clear. Uvula midline.   Cardiovascular:      Rate and Rhythm: Normal rate and regular rhythm.      Heart sounds: Normal heart sounds, S1 normal and S2 normal.   Pulmonary:      Effort: Pulmonary effort is normal. No respiratory distress.      Breath sounds: Normal breath sounds. No decreased breath sounds, wheezing, rhonchi or rales.   Abdominal:      General: Bowel sounds are normal.      Palpations: Abdomen is soft.      Tenderness: There is no abdominal tenderness.   Musculoskeletal:      Cervical back: Normal range of motion.   Skin:     General: Skin is warm.      Capillary Refill: Capillary refill takes less than 2 seconds.   Neurological:      Mental Status: He is alert and oriented to  person, place, and time.   Psychiatric:         Thought Content: Thought content does not include homicidal or suicidal ideation. Thought content does not include homicidal or suicidal plan.               Procedures   Assessment and Plan (including Health Maintenance)   :    Plan:     Problem List Items Addressed This Visit          Neuro    Vitamin B12 deficiency neuropathy    Current Assessment & Plan     .  Neuropathy is still apparent with no sensation in his lower extremities particular when low his knees.  Likely this is also related to his diabetes.            Psychiatric    Depression, recurrent    Current Assessment & Plan     Patient is currently stable patient does not want any treatment at this time.  Recommend that we use Celexa once daily            Derm    Disorder of skin and subcutaneous tissue    Current Assessment & Plan     Clindamycin RX. Medication instructions and education given with understanding voiced. RTC with worsening, new or persistent symptoms.             Cardiac/Vascular    Peripheral vascular disease, unspecified       Endocrine    Type 2 diabetes mellitus with neurologic complication, without long-term current use of insulin - Primary    Current Assessment & Plan     Lab Results   Component Value Date    HGBA1C 6.5 04/23/2024      Continue metformin 500 once daily.  Discussed diet.  Exercise 5 days per week.  Follow-up every 3 months.  Hemoglobin A1c lipid and BMP at next visit.         Relevant Orders    Microalbumin/Creatinine Ratio, Urine (Completed)    Hemoglobin A1C (Completed)       Orthopedic    Ulcer of left foot, limited to breakdown of skin    Current Assessment & Plan     Will refer to podiatry. Pt will be called with appt date and time.          Acquired absence of other left toe(s)       Other    Primary insomnia    Current Assessment & Plan     Trazodone RX today  Reviewed treatment plan and medication side effects/risk/benefits/directions on taking medications.  Patient was instructed to take medication on a night that they do not have to get up and drive, go to work, etc. to monitor for morning drowsiness. Patient was instructed to return to clinic for follow-up on medication to determine effectiveness. Patient verbalized understanding of treatment plan and denies any questions.            Health Maintenance Topics with due status: Not Due       Topic Last Completion Date    TETANUS VACCINE 08/04/2023    PROSTATE-SPECIFIC ANTIGEN 10/18/2023    Lipid Panel 01/18/2024    Eye Exam 01/22/2024    Low Dose Statin 04/22/2024    Diabetes Urine Screening 04/23/2024    Hemoglobin A1c 04/23/2024       Future Appointments   Date Time Provider Department Center   11/14/2024  1:00 PM AWV NURSE [facility] [place]   2/27/2025  3:00 PM Mayela Tipton [facility] AUDIO [facility]   2/27/2025  3:30 PM Jack Barrow MD [facility] ENT [facility]        Health Maintenance Due   Topic Date Due    HIV Screening  Never done    Colorectal Cancer Screening  Never done    Pneumococcal Vaccines (Age 0-64) (2 of 2 - PCV) 08/28/2016    Shingles Vaccine (1 of 2) Never done    COVID-19 Vaccine (1 - 2023-24 season) Never done    Foot Exam  03/17/2024        Follow up in about 3 months (around 7/22/2024).     Signature:  HAYLEY RIOS  [facility]  [address]  [place]  [address]    Date of encounter: 4/22/24

## 2024-04-23 LAB
CREAT UR-MCNC: 153 MG/DL (ref 39–259)
MICROALBUMIN UR-MCNC: 5.4 MG/DL (ref 0–2.8)
MICROALBUMIN/CREAT RATIO PNL UR: 35.3 MG/G (ref 0–30)

## 2024-04-23 PROCEDURE — 82570 ASSAY OF URINE CREATININE: CPT | Mod: ,,, | Performed by: CLINICAL MEDICAL LABORATORY

## 2024-04-23 PROCEDURE — 82043 UR ALBUMIN QUANTITATIVE: CPT | Mod: ,,, | Performed by: CLINICAL MEDICAL LABORATORY

## 2024-05-09 ENCOUNTER — PATIENT MESSAGE (OUTPATIENT)
Dept: FAMILY MEDICINE | Facility: CLINIC | Age: 54
End: 2024-05-09
Payer: MEDICARE

## 2024-05-10 ENCOUNTER — TELEPHONE (OUTPATIENT)
Dept: FAMILY MEDICINE | Facility: CLINIC | Age: 54
End: 2024-05-10
Payer: MEDICARE

## 2024-05-10 DIAGNOSIS — F51.01 PRIMARY INSOMNIA: Primary | ICD-10-CM

## 2024-05-10 RX ORDER — SULFAMETHOXAZOLE AND TRIMETHOPRIM 800; 160 MG/1; MG/1
1 TABLET ORAL EVERY 12 HOURS
COMMUNITY
Start: 2024-05-08

## 2024-05-10 RX ORDER — TRAZODONE HYDROCHLORIDE 100 MG/1
100 TABLET ORAL NIGHTLY
Qty: 30 TABLET | Refills: 2 | Status: SHIPPED | OUTPATIENT
Start: 2024-05-10

## 2024-05-10 NOTE — TELEPHONE ENCOUNTER
Patient is currently taking Trazodone 50 mg at bedtime. He reports that this helps some night but not others. He is asking for an increase in dosing if appropriate. He uses [facility].

## 2024-05-10 NOTE — TELEPHONE ENCOUNTER
I sent increase dose to pharmacy. Stop the Trazodone 50 mg and start Trazodone 100 mg one  tablet at night.

## 2024-05-15 NOTE — ASSESSMENT & PLAN NOTE
Trazodone RX today  Reviewed treatment plan and medication side effects/risk/benefits/directions on taking medications. Patient was instructed to take medication on a night that they do not have to get up and drive, go to work, etc. to monitor for morning drowsiness. Patient was instructed to return to clinic for follow-up on medication to determine effectiveness. Patient verbalized understanding of treatment plan and denies any questions.

## 2024-05-15 NOTE — ASSESSMENT & PLAN NOTE
.  Neuropathy is still apparent with no sensation in his lower extremities particular when low his knees.  Likely this is also related to his diabetes.

## 2024-05-15 NOTE — ASSESSMENT & PLAN NOTE
Lab Results   Component Value Date    HGBA1C 6.5 04/23/2024      Continue metformin 500 once daily.  Discussed diet.  Exercise 5 days per week.  Follow-up every 3 months.  Hemoglobin A1c lipid and BMP at next visit.

## 2024-05-15 NOTE — ASSESSMENT & PLAN NOTE
Clindamycin RX. Medication instructions and education given with understanding voiced. RTC with worsening, new or persistent symptoms.

## 2024-07-11 ENCOUNTER — OFFICE VISIT (OUTPATIENT)
Dept: FAMILY MEDICINE | Facility: CLINIC | Age: 54
End: 2024-07-11
Payer: MEDICARE

## 2024-07-11 VITALS
DIASTOLIC BLOOD PRESSURE: 76 MMHG | RESPIRATION RATE: 18 BRPM | HEART RATE: 79 BPM | SYSTOLIC BLOOD PRESSURE: 118 MMHG | BODY MASS INDEX: 30.66 KG/M2 | OXYGEN SATURATION: 97 % | TEMPERATURE: 98 F | WEIGHT: 214.19 LBS | HEIGHT: 70 IN

## 2024-07-11 DIAGNOSIS — L98.9 DISORDER OF SKIN AND SUBCUTANEOUS TISSUE: Primary | ICD-10-CM

## 2024-07-11 DIAGNOSIS — E11.43 TYPE 2 DIABETES MELLITUS WITH DIABETIC AUTONOMIC NEUROPATHY, WITHOUT LONG-TERM CURRENT USE OF INSULIN: ICD-10-CM

## 2024-07-11 DIAGNOSIS — I10 ESSENTIAL HYPERTENSION: ICD-10-CM

## 2024-07-11 DIAGNOSIS — E78.2 MIXED HYPERLIPIDEMIA: ICD-10-CM

## 2024-07-11 LAB
ALBUMIN SERPL BCP-MCNC: 4.3 G/DL (ref 3.5–5)
ALBUMIN/GLOB SERPL: 1.3 {RATIO}
ALP SERPL-CCNC: 93 U/L (ref 45–115)
ALT SERPL W P-5'-P-CCNC: 55 U/L (ref 16–61)
ANION GAP SERPL CALCULATED.3IONS-SCNC: 11 MMOL/L (ref 7–16)
AST SERPL W P-5'-P-CCNC: 32 U/L (ref 15–37)
BASOPHILS # BLD AUTO: 0.1 K/UL (ref 0–0.2)
BASOPHILS NFR BLD AUTO: 1.1 % (ref 0–1)
BILIRUB SERPL-MCNC: 0.6 MG/DL (ref ?–1.2)
BUN SERPL-MCNC: 9 MG/DL (ref 7–18)
BUN/CREAT SERPL: 11 (ref 6–20)
CALCIUM SERPL-MCNC: 9.7 MG/DL (ref 8.5–10.1)
CHLORIDE SERPL-SCNC: 104 MMOL/L (ref 98–107)
CHOLEST SERPL-MCNC: 121 MG/DL (ref 0–200)
CHOLEST/HDLC SERPL: 3.9 {RATIO}
CO2 SERPL-SCNC: 29 MMOL/L (ref 21–32)
CREAT SERPL-MCNC: 0.83 MG/DL (ref 0.7–1.3)
DIFFERENTIAL METHOD BLD: ABNORMAL
EGFR (NO RACE VARIABLE) (RUSH/TITUS): 104 ML/MIN/1.73M2
EOSINOPHIL # BLD AUTO: 0.33 K/UL (ref 0–0.5)
EOSINOPHIL NFR BLD AUTO: 3.7 % (ref 1–4)
ERYTHROCYTE [DISTWIDTH] IN BLOOD BY AUTOMATED COUNT: 12.7 % (ref 11.5–14.5)
EST. AVERAGE GLUCOSE BLD GHB EST-MCNC: 160 MG/DL
GLOBULIN SER-MCNC: 3.2 G/DL (ref 2–4)
GLUCOSE SERPL-MCNC: 148 MG/DL (ref 74–106)
HBA1C MFR BLD HPLC: 7.2 % (ref 4.5–6.6)
HCT VFR BLD AUTO: 46.9 % (ref 40–54)
HDLC SERPL-MCNC: 31 MG/DL (ref 40–60)
HGB BLD-MCNC: 15 G/DL (ref 13.5–18)
IMM GRANULOCYTES # BLD AUTO: 0.04 K/UL (ref 0–0.04)
IMM GRANULOCYTES NFR BLD: 0.5 % (ref 0–0.4)
LDLC SERPL CALC-MCNC: 53 MG/DL
LDLC/HDLC SERPL: 1.7 {RATIO}
LYMPHOCYTES # BLD AUTO: 2.48 K/UL (ref 1–4.8)
LYMPHOCYTES NFR BLD AUTO: 28.1 % (ref 27–41)
MCH RBC QN AUTO: 31.2 PG (ref 27–31)
MCHC RBC AUTO-ENTMCNC: 32 G/DL (ref 32–36)
MCV RBC AUTO: 97.5 FL (ref 80–96)
MONOCYTES # BLD AUTO: 0.62 K/UL (ref 0–0.8)
MONOCYTES NFR BLD AUTO: 7 % (ref 2–6)
MPC BLD CALC-MCNC: 11.9 FL (ref 9.4–12.4)
NEUTROPHILS # BLD AUTO: 5.27 K/UL (ref 1.8–7.7)
NEUTROPHILS NFR BLD AUTO: 59.6 % (ref 53–65)
NONHDLC SERPL-MCNC: 90 MG/DL
NRBC # BLD AUTO: 0 X10E3/UL
NRBC, AUTO (.00): 0 %
PLATELET # BLD AUTO: 250 K/UL (ref 150–400)
POTASSIUM SERPL-SCNC: 4.3 MMOL/L (ref 3.5–5.1)
PROT SERPL-MCNC: 7.5 G/DL (ref 6.4–8.2)
RBC # BLD AUTO: 4.81 M/UL (ref 4.6–6.2)
SODIUM SERPL-SCNC: 140 MMOL/L (ref 136–145)
TRIGL SERPL-MCNC: 187 MG/DL (ref 35–150)
VLDLC SERPL-MCNC: 37 MG/DL
WBC # BLD AUTO: 8.84 K/UL (ref 4.5–11)

## 2024-07-11 PROCEDURE — 3066F NEPHROPATHY DOC TX: CPT | Mod: ,,,

## 2024-07-11 PROCEDURE — 3051F HG A1C>EQUAL 7.0%<8.0%: CPT | Mod: ,,,

## 2024-07-11 PROCEDURE — 99214 OFFICE O/P EST MOD 30 MIN: CPT | Mod: 25,,,

## 2024-07-11 PROCEDURE — 85025 COMPLETE CBC W/AUTO DIFF WBC: CPT | Mod: ,,, | Performed by: CLINICAL MEDICAL LABORATORY

## 2024-07-11 PROCEDURE — 3074F SYST BP LT 130 MM HG: CPT | Mod: ,,,

## 2024-07-11 PROCEDURE — 3078F DIAST BP <80 MM HG: CPT | Mod: ,,,

## 2024-07-11 PROCEDURE — 3060F POS MICROALBUMINURIA REV: CPT | Mod: ,,,

## 2024-07-11 PROCEDURE — 1159F MED LIST DOCD IN RCRD: CPT | Mod: ,,,

## 2024-07-11 PROCEDURE — 80061 LIPID PANEL: CPT | Mod: ,,, | Performed by: CLINICAL MEDICAL LABORATORY

## 2024-07-11 PROCEDURE — 1160F RVW MEDS BY RX/DR IN RCRD: CPT | Mod: ,,,

## 2024-07-11 PROCEDURE — 3008F BODY MASS INDEX DOCD: CPT | Mod: ,,,

## 2024-07-11 PROCEDURE — 83036 HEMOGLOBIN GLYCOSYLATED A1C: CPT | Mod: ,,, | Performed by: CLINICAL MEDICAL LABORATORY

## 2024-07-11 PROCEDURE — 96372 THER/PROPH/DIAG INJ SC/IM: CPT | Mod: ,,,

## 2024-07-11 PROCEDURE — 80053 COMPREHEN METABOLIC PANEL: CPT | Mod: ,,, | Performed by: CLINICAL MEDICAL LABORATORY

## 2024-07-11 RX ORDER — SULFAMETHOXAZOLE AND TRIMETHOPRIM 800; 160 MG/1; MG/1
1 TABLET ORAL 2 TIMES DAILY
Qty: 20 TABLET | Refills: 0 | Status: SHIPPED | OUTPATIENT
Start: 2024-07-11 | End: 2024-07-21

## 2024-07-11 RX ORDER — GLIMEPIRIDE 2 MG/1
2 TABLET ORAL
Qty: 90 TABLET | Refills: 0 | Status: SHIPPED | OUTPATIENT
Start: 2024-07-11

## 2024-07-11 RX ORDER — LINCOMYCIN HYDROCHLORIDE 300 MG/ML
600 INJECTION, SOLUTION INTRAMUSCULAR; INTRAVENOUS; SUBCONJUNCTIVAL
Status: COMPLETED | OUTPATIENT
Start: 2024-07-11 | End: 2024-07-11

## 2024-07-11 RX ORDER — ATORVASTATIN CALCIUM 40 MG/1
40 TABLET, FILM COATED ORAL DAILY
Qty: 90 TABLET | Refills: 1 | Status: SHIPPED | OUTPATIENT
Start: 2024-07-11

## 2024-07-11 RX ORDER — AMLODIPINE BESYLATE 5 MG/1
10 TABLET ORAL DAILY
Qty: 180 TABLET | Refills: 1 | Status: SHIPPED | OUTPATIENT
Start: 2024-07-11

## 2024-07-11 RX ADMIN — LINCOMYCIN HYDROCHLORIDE 600 MG: 300 INJECTION, SOLUTION INTRAMUSCULAR; INTRAVENOUS; SUBCONJUNCTIVAL at 02:07

## 2024-07-11 NOTE — PROGRESS NOTES
HAYLEY RIOS   [facility]  [address]  [place]  [address]      PATIENT NAME: Brnuo Levy  : 1970  DATE: 24  MRN: [id number]            History of Present Illness / Problem Focused Workflow       Post-op Problem (Patient is a 54 year old male presenting with redness around surgical site to left great toe area.  Reports having bone removed on 2024.  Site appears to be healing well but there is some redness around the area.  Does report some yellow drainage from site at times.), Diabetes (Here for check up and med refills.), Hyperlipidemia (Here for check up and med refills.), Hypertension (Here for check up and med refills.)    Review of Systems     @Review of Systems   Constitutional:  Negative for chills, fatigue and fever.   HENT:  Negative for nasal congestion, ear discharge, ear pain, rhinorrhea, sinus pressure/congestion and sore throat.    Respiratory:  Negative for cough, chest tightness, shortness of breath, wheezing and stridor.    Cardiovascular:  Negative for palpitations and claudication.   Gastrointestinal:  Negative for abdominal pain, constipation, diarrhea, nausea, vomiting and reflux.   Genitourinary:  Negative for dysuria, flank pain, frequency, hematuria and urgency.   Musculoskeletal:  Negative for myalgias.   Integumentary:  Positive for wound.   Neurological:  Negative for dizziness, weakness, light-headedness and headaches.   Psychiatric/Behavioral:  Negative for suicidal ideas.        Medical / Social / Family History     Past Medical History:   Diagnosis Date    Depression     Diabetes mellitus, type 2     History of methicillin resistant staphylococcus aureus (MRSA)     Hyperlipidemia     Hypertension     Neuropathy     Osteomyelitis        Past Surgical History:   Procedure Laterality Date    FRACTURE SURGERY      arm    LUMBAR FUSION      NASAL SEPTUM SURGERY      SPINAL CORD STIMULATOR IMPLANT      SPINAL CORD STIMULATOR  REMOVAL      TOE AMPUTATION      rt foot great to and 5th toe;lt foot great,4th and 5th toes.           Medications and Allergies     Medications  Outpatient Medications Marked as Taking for the 7/11/24 encounter (Office Visit) with Franklyn Acosta FNP   Medication Sig Dispense Refill    baclofen (LIORESAL) 20 MG tablet Take 20 mg by mouth every 6 (six) hours as needed.      blood sugar diagnostic Strp 1 strip by Misc.(Non-Drug; Combo Route) route once daily. 100 each 1    blood-glucose meter (TRUE METRIX AIR GLUCOSE METER) Misc 1 each by Other route 4 (four) times daily before meals and nightly. 1 each 0    gabapentin (NEURONTIN) 600 MG tablet 1.5 tablets every 6 hours      lancets (TRUEPLUS LANCETS) 33 gauge Misc 1 lancet  by Other route before meals, at bedtime and at 0200. 100 each 0    meclizine (ANTIVERT) 25 mg tablet Take 1 tablet (25 mg total) by mouth 3 (three) times daily as needed for Dizziness. 90 tablet 0    metFORMIN (GLUCOPHAGE-XR) 500 MG ER 24hr tablet TAKE 2 TABLETS TWICE DAILY WITH MEALS 360 tablet 1    oxyCODONE-acetaminophen (PERCOCET) 7.5-325 mg per tablet Take 1 tablet by mouth every 8 (eight) hours as needed.      traZODone (DESYREL) 100 MG tablet Take 1 tablet (100 mg total) by mouth every evening. 30 tablet 2    [DISCONTINUED] amLODIPine (NORVASC) 5 MG tablet Take 2 tablets (10 mg total) by mouth once daily. 180 tablet 1    [DISCONTINUED] atorvastatin (LIPITOR) 40 MG tablet Take 1 tablet (40 mg total) by mouth once daily. 90 tablet 3    [DISCONTINUED] glimepiride (AMARYL) 2 MG tablet Take 1 tablet (2 mg total) by mouth before breakfast. 90 tablet 0       Allergies  Review of patient's allergies indicates:   Allergen Reactions    Levofloxacin (bulk) Other (See Comments)     Worsened muscle spasms       Physical Examination     Vitals:    07/11/24 1339   BP: 118/76   Pulse: 79   Resp: 18   Temp: 97.6 °F (36.4 °C)     Physical Exam  Vitals and nursing note reviewed.    Constitutional:       General: He is awake.      Appearance: Normal appearance.   HENT:      Head: Normocephalic.      Right Ear: Tympanic membrane, ear canal and external ear normal.      Left Ear: Tympanic membrane, ear canal and external ear normal.      Nose: Nose normal.      Mouth/Throat:      Lips: Pink.      Mouth: Mucous membranes are moist.      Pharynx: Oropharynx is clear. Uvula midline.   Cardiovascular:      Rate and Rhythm: Normal rate and regular rhythm.      Pulses:           Posterior tibial pulses are 1+ on the right side and 1+ on the left side.      Heart sounds: Normal heart sounds, S1 normal and S2 normal.   Pulmonary:      Effort: Pulmonary effort is normal. No respiratory distress.      Breath sounds: Normal breath sounds. No decreased breath sounds, wheezing, rhonchi or rales.   Abdominal:      General: Bowel sounds are normal.      Palpations: Abdomen is soft.      Tenderness: There is no abdominal tenderness.   Musculoskeletal:      Cervical back: Normal range of motion.        Feet:    Feet:      Left foot:      Skin integrity: Skin breakdown and erythema present.   Skin:     General: Skin is warm.      Capillary Refill: Capillary refill takes less than 2 seconds.   Neurological:      Mental Status: He is alert and oriented to person, place, and time.   Psychiatric:         Thought Content: Thought content does not include homicidal or suicidal ideation. Thought content does not include homicidal or suicidal plan.             Procedures   Assessment and Plan (including Health Maintenance)   :    Plan:     Problem List Items Addressed This Visit          Derm    Disorder of skin and subcutaneous tissue - Primary    Current Assessment & Plan     Lincocin IM today. Bactrim RX. RTC with  worsening, new or persistent symptoms             Cardiac/Vascular    Essential hypertension    Current Assessment & Plan     Monitor BP daily and keep BP daily log to bring to next visit. Exercise at  "least 5 times a week. Keep scheduled appts. RTC sooner if BP is staying <120/80. Dash diet.    DASH- includes foods rich in K+, calcium and Magnesium.The diet limits foods high in sodium, saturated fats and added sugars. This is a flexible balanced eating plan that helps create heart healthy eating style for life. Diet is rich in vegetable, whole grains and fruits. It includes fat free or low fat dairy products, fish, poultry, nuts. Chose foods high in K+, calcium, magnesium, fiber, protein, and low in saturated fats and sodium.          Relevant Medications    amLODIPine (NORVASC) 5 MG tablet    Other Relevant Orders    CBC Auto Differential (Completed)    Comprehensive Metabolic Panel (Completed)    Hyperlipidemia    Current Assessment & Plan     Lipid panel obtained at today's visit. Goal LDL is less than 70. Continue current meds and low fat/low cholesterol diet with increased exercise as tolerated. Will follow up with labs. Patient to follow up in 3 months or as needed    A few changes in your diet can reduce cholesterol and improve your heart health. Saturated fats, found primarily in red meat and full-fat dairy products, raise your total cholesterol. Decreasing your consumption of saturated fats can reduce your low-density lipoprotein (LDL) cholesterol. rans fats, sometimes listed on food labels as "partially hydrogenated vegetable oil," are often used in margarines and store-bought cookies, crackers and cakes.     Trans fats raise overall cholesterol levels. Omega-3 fatty acids don't affect LDL cholesterol. But they have other heart-healthy benefits, including reducing blood pressure. Foods with omega-3 fatty acids include salmon, mackerel, herring, walnuts and flaxseeds.Soluble fiber can reduce the absorption of cholesterol into your bloodstream. Soluble fiber is found in such foods as oatmeal, kidney beans, [place] sprouts, apples and pears.  at least 30 minutes of exercise five times a week or vigorous " aerobic activity for 20 minutes three times a week if tolerated.           Relevant Medications    atorvastatin (LIPITOR) 40 MG tablet    Other Relevant Orders    Lipid Panel (Completed)       Endocrine    Type 2 diabetes mellitus with neurologic complication, without long-term current use of insulin    Current Assessment & Plan       Lab Results   Component Value Date    HGBA1C 7.2 (H) 07/11/2024      Continue metformin 500 once daily.  Discussed diet.  Exercise 5 days per week.  Follow-up every 3 months.  Hemoglobin A1c lipid and BMP at next visit.         Relevant Medications    glimepiride (AMARYL) 2 MG tablet    Other Relevant Orders    Comprehensive Metabolic Panel (Completed)    Hemoglobin A1C (Completed)       Health Maintenance Topics with due status: Not Due       Topic Last Completion Date    TETANUS VACCINE 08/04/2023    PROSTATE-SPECIFIC ANTIGEN 10/18/2023    Influenza Vaccine 10/18/2023    Eye Exam 01/22/2024    Diabetes Urine Screening 04/23/2024    Low Dose Statin 07/11/2024    Lipid Panel 07/11/2024    Hemoglobin A1c 07/11/2024       Future Appointments   Date Time Provider Department Center   11/14/2024  1:00 PM AW[facility] NURSE [facility]   2/27/2025  3:00 PM Mayela Tipton [facility]UD[facility]   2/27/2025  3:30 PM Jack Barrow MD [facility] ENT [facility]        Health Maintenance Due   Topic Date Due    HIV Screening  Never done    Colorectal Cancer Screening  Never done    Pneumococcal Vaccines (Age 0-64) (2 of 2 - PCV) 08/28/2016    Shingles Vaccine (1 of 2) Never done    COVID-19 Vaccine (1 - 2023-24 season) Never done    Foot Exam  03/17/2024        Follow up in about 3 months (around 10/11/2024), or if symptoms worsen or fail to improve.     Signature:  HAYLEY RIOS  [facility]  [address] [place]  [address]    Date of encounter: 7/11/24

## 2024-07-12 ENCOUNTER — DOCUMENT SCAN (OUTPATIENT)
Dept: HOME HEALTH SERVICES | Facility: HOSPITAL | Age: 54
End: 2024-07-12
Payer: MEDICARE

## 2024-08-12 NOTE — ASSESSMENT & PLAN NOTE
"Lipid panel obtained at today's visit. Goal LDL is less than 70. Continue current meds and low fat/low cholesterol diet with increased exercise as tolerated. Will follow up with labs. Patient to follow up in 3 months or as needed    A few changes in your diet can reduce cholesterol and improve your heart health. Saturated fats, found primarily in red meat and full-fat dairy products, raise your total cholesterol. Decreasing your consumption of saturated fats can reduce your low-density lipoprotein (LDL) cholesterol. rans fats, sometimes listed on food labels as "partially hydrogenated vegetable oil," are often used in margarines and store-bought cookies, crackers and cakes.     Trans fats raise overall cholesterol levels. Omega-3 fatty acids don't affect LDL cholesterol. But they have other heart-healthy benefits, including reducing blood pressure. Foods with omega-3 fatty acids include salmon, mackerel, herring, walnuts and flaxseeds.Soluble fiber can reduce the absorption of cholesterol into your bloodstream. Soluble fiber is found in such foods as oatmeal, kidney beans, [place] sprouts, apples and pears.  at least 30 minutes of exercise five times a week or vigorous aerobic activity for 20 minutes three times a week if tolerated.    "

## 2024-08-12 NOTE — ASSESSMENT & PLAN NOTE
Lab Results   Component Value Date    HGBA1C 7.2 (H) 07/11/2024      Continue metformin 500 once daily.  Discussed diet.  Exercise 5 days per week.  Follow-up every 3 months.  Hemoglobin A1c lipid and BMP at next visit.

## 2024-08-12 NOTE — ASSESSMENT & PLAN NOTE
Monitor BP daily and keep BP daily log to bring to next visit. Exercise at least 5 times a week. Keep scheduled appts. RTC sooner if BP is staying <120/80. Dash diet.    DASH- includes foods rich in K+, calcium and Magnesium.The diet limits foods high in sodium, saturated fats and added sugars. This is a flexible balanced eating plan that helps create heart healthy eating style for life. Diet is rich in vegetable, whole grains and fruits. It includes fat free or low fat dairy products, fish, poultry, nuts. Chose foods high in K+, calcium, magnesium, fiber, protein, and low in saturated fats and sodium.

## 2024-08-20 ENCOUNTER — OFFICE VISIT (OUTPATIENT)
Dept: FAMILY MEDICINE | Facility: CLINIC | Age: 54
End: 2024-08-20
Payer: MEDICARE

## 2024-08-20 VITALS
SYSTOLIC BLOOD PRESSURE: 120 MMHG | RESPIRATION RATE: 20 BRPM | HEIGHT: 70 IN | OXYGEN SATURATION: 98 % | BODY MASS INDEX: 30.15 KG/M2 | HEART RATE: 76 BPM | DIASTOLIC BLOOD PRESSURE: 72 MMHG | WEIGHT: 210.63 LBS | TEMPERATURE: 98 F

## 2024-08-20 DIAGNOSIS — L98.9 DISORDER OF SKIN AND SUBCUTANEOUS TISSUE: Primary | ICD-10-CM

## 2024-08-20 PROCEDURE — 3074F SYST BP LT 130 MM HG: CPT | Mod: ,,,

## 2024-08-20 PROCEDURE — 3078F DIAST BP <80 MM HG: CPT | Mod: ,,,

## 2024-08-20 PROCEDURE — 1160F RVW MEDS BY RX/DR IN RCRD: CPT | Mod: ,,,

## 2024-08-20 PROCEDURE — 96372 THER/PROPH/DIAG INJ SC/IM: CPT | Mod: ,,,

## 2024-08-20 PROCEDURE — 3008F BODY MASS INDEX DOCD: CPT | Mod: ,,,

## 2024-08-20 PROCEDURE — 3060F POS MICROALBUMINURIA REV: CPT | Mod: ,,,

## 2024-08-20 PROCEDURE — 3051F HG A1C>EQUAL 7.0%<8.0%: CPT | Mod: ,,,

## 2024-08-20 PROCEDURE — 99213 OFFICE O/P EST LOW 20 MIN: CPT | Mod: 25,,,

## 2024-08-20 PROCEDURE — 1159F MED LIST DOCD IN RCRD: CPT | Mod: ,,,

## 2024-08-20 PROCEDURE — 3066F NEPHROPATHY DOC TX: CPT | Mod: ,,,

## 2024-08-20 RX ORDER — CLINDAMYCIN HYDROCHLORIDE 300 MG/1
300 CAPSULE ORAL EVERY 8 HOURS
Qty: 30 CAPSULE | Refills: 0 | Status: SHIPPED | OUTPATIENT
Start: 2024-08-20 | End: 2024-08-30

## 2024-08-20 RX ORDER — TRIAMCINOLONE ACETONIDE 1 MG/G
OINTMENT TOPICAL 2 TIMES DAILY PRN
COMMUNITY

## 2024-08-20 RX ORDER — LINCOMYCIN HYDROCHLORIDE 300 MG/ML
600 INJECTION, SOLUTION INTRAMUSCULAR; INTRAVENOUS; SUBCONJUNCTIVAL
Status: COMPLETED | OUTPATIENT
Start: 2024-08-20 | End: 2024-08-20

## 2024-08-20 RX ADMIN — LINCOMYCIN HYDROCHLORIDE 600 MG: 300 INJECTION, SOLUTION INTRAMUSCULAR; INTRAVENOUS; SUBCONJUNCTIVAL at 12:08

## 2024-09-20 NOTE — PROGRESS NOTES
HAYLEY RIOS   [facility]  [address]  [place]  [address]      PATIENT NAME: Bruno Levy  : 1970  DATE: 24  MRN: [id number]        Reason for Visit / Chief Complaint: Non-healing Wound (Patient has open wound to posterior ankle x 3-4 weeks. Patient reports he had fluid filled blister to ankle that opened up.)         History of Present Illness / Problem Focused Workflow     Patient has open wound to posterior ankle x 3-4 weeks. Patient reports he had fluid filled blister to ankle that opened up.      Review of Systems     @Review of Systems   Constitutional:  Negative for chills, fatigue and fever.   HENT:  Negative for nasal congestion, ear discharge, ear pain, rhinorrhea, sinus pressure/congestion and sore throat.    Respiratory:  Negative for cough, chest tightness, shortness of breath, wheezing and stridor.    Cardiovascular:  Negative for palpitations and claudication.   Gastrointestinal:  Negative for abdominal pain, constipation, diarrhea, nausea, vomiting and reflux.   Genitourinary:  Negative for dysuria, flank pain, frequency, hematuria and urgency.   Musculoskeletal:  Negative for myalgias.   Integumentary:  Positive for wound.   Neurological:  Negative for dizziness, weakness, light-headedness and headaches.   Psychiatric/Behavioral:  Negative for suicidal ideas.        Medical / Social / Family History     Past Medical History:   Diagnosis Date    Depression     Diabetes mellitus, type 2     History of methicillin resistant staphylococcus aureus (MRSA)     Hyperlipidemia     Hypertension     Neuropathy     Osteomyelitis        Past Surgical History:   Procedure Laterality Date    FRACTURE SURGERY      arm    LUMBAR FUSION      NASAL SEPTUM SURGERY      SPINAL CORD STIMULATOR IMPLANT      SPINAL CORD STIMULATOR REMOVAL      TOE AMPUTATION      rt foot great to and 5th toe;lt foot great,4th and 5th toes.           Medications and Allergies      Medications  Outpatient Medications Marked as Taking for the 8/20/24 encounter (Office Visit) with Franklyn Acosta FNP   Medication Sig Dispense Refill    amLODIPine (NORVASC) 5 MG tablet Take 2 tablets (10 mg total) by mouth once daily. 180 tablet 1    atorvastatin (LIPITOR) 40 MG tablet Take 1 tablet (40 mg total) by mouth once daily. 90 tablet 1    baclofen (LIORESAL) 20 MG tablet Take 20 mg by mouth every 6 (six) hours as needed.      blood sugar diagnostic Strp 1 strip by Misc.(Non-Drug; Combo Route) route once daily. 100 each 1    blood-glucose meter (TRUE METRIX AIR GLUCOSE METER) Misc 1 each by Other route 4 (four) times daily before meals and nightly. 1 each 0    gabapentin (NEURONTIN) 600 MG tablet 1.5 tablets every 6 hours      glimepiride (AMARYL) 2 MG tablet Take 1 tablet (2 mg total) by mouth before breakfast. 90 tablet 0    lancets (TRUEPLUS LANCETS) 33 gauge Misc 1 lancet  by Other route before meals, at bedtime and at 0200. 100 each 0    meclizine (ANTIVERT) 25 mg tablet Take 1 tablet (25 mg total) by mouth 3 (three) times daily as needed for Dizziness. 90 tablet 0    metFORMIN (GLUCOPHAGE-XR) 500 MG ER 24hr tablet TAKE 2 TABLETS TWICE DAILY WITH MEALS 360 tablet 1    oxyCODONE-acetaminophen (PERCOCET) 7.5-325 mg per tablet Take 1 tablet by mouth every 8 (eight) hours as needed.      traZODone (DESYREL) 100 MG tablet Take 1 tablet (100 mg total) by mouth every evening. 30 tablet 2    triamcinolone acetonide 0.1% (KENALOG) 0.1 % ointment Apply topically 2 (two) times daily as needed.         Allergies  Review of patient's allergies indicates:   Allergen Reactions    Levofloxacin (bulk) Other (See Comments)     Worsened muscle spasms       Physical Examination     Vitals:    08/20/24 1111   BP: 120/72   Pulse: 76   Resp: 20   Temp: 98 °F (36.7 °C)     Physical Exam  Vitals and nursing note reviewed.   Constitutional:       General: He is awake.      Appearance: Normal appearance.   HENT:       Head: Normocephalic.      Right Ear: Tympanic membrane, ear canal and external ear normal.      Left Ear: Tympanic membrane, ear canal and external ear normal.      Nose: Nose normal.      Mouth/Throat:      Lips: Pink.      Mouth: Mucous membranes are moist.      Pharynx: Oropharynx is clear. Uvula midline.   Cardiovascular:      Rate and Rhythm: Normal rate and regular rhythm.      Pulses:           Dorsalis pedis pulses are 1+ on the right side and 1+ on the left side.      Heart sounds: Normal heart sounds, S1 normal and S2 normal.   Pulmonary:      Effort: Pulmonary effort is normal. No respiratory distress.      Breath sounds: Normal breath sounds. No decreased breath sounds, wheezing, rhonchi or rales.   Abdominal:      General: Bowel sounds are normal.      Palpations: Abdomen is soft.      Tenderness: There is no abdominal tenderness.   Musculoskeletal:      Cervical back: Normal range of motion.   Feet:      Left foot:      Skin integrity: Skin breakdown and erythema present.   Skin:     General: Skin is warm.      Capillary Refill: Capillary refill takes less than 2 seconds.      Findings: Erythema present.   Neurological:      Mental Status: He is alert and oriented to person, place, and time.   Psychiatric:         Thought Content: Thought content does not include homicidal or suicidal ideation. Thought content does not include homicidal or suicidal plan.               Procedures   Assessment and Plan (including Health Maintenance)   :    Plan:     Problem List Items Addressed This Visit          Derm    Disorder of skin and subcutaneous tissue - Primary    Current Assessment & Plan     Lincocin IM today.  Clindamycin RX. RTC with  worsening, new or persistent symptoms             Health Maintenance Topics with due status: Not Due       Topic Last Completion Date    TETANUS VACCINE 08/04/2023    PROSTATE-SPECIFIC ANTIGEN 10/18/2023    Eye Exam 01/22/2024    Diabetes Urine Screening 04/23/2024    Lipid  Panel 07/11/2024    Low Dose Statin 08/20/2024       Future Appointments   Date Time Provider Department Center   11/14/2024  1:00 PM AW[facility] NURSE [facility]   2/27/2025  3:00 PM Mayela Tipton [facility] AUDIO [facility]   2/27/2025  3:30 PM Jack Barrow MD [facility] ENT [facility]        Health Maintenance Due   Topic Date Due    HIV Screening  Never done    Colorectal Cancer Screening  Never done    Pneumococcal Vaccines (Age 0-64) (2 of 2 - PCV) 08/28/2016    Shingles Vaccine (1 of 2) Never done    Foot Exam  03/17/2024    Influenza Vaccine (1) 09/01/2024    COVID-19 Vaccine (1 - 2023-24 season) Never done    Hemoglobin A1c  10/11/2024        Follow up in about 1 week (around 8/27/2024).     Signature:  HAYLEY RIOS  [facility]  [address] [place] [address], [place]  [address]    Date of encounter: 8/20/24

## 2024-09-30 DIAGNOSIS — F51.01 PRIMARY INSOMNIA: ICD-10-CM

## 2024-09-30 RX ORDER — TRAZODONE HYDROCHLORIDE 100 MG/1
100 TABLET ORAL NIGHTLY
Qty: 30 TABLET | Refills: 2 | Status: SHIPPED | OUTPATIENT
Start: 2024-09-30

## 2024-09-30 NOTE — TELEPHONE ENCOUNTER
"Patient called requesting a refill on trazodone be sent to [facility], last ov 08/20/24, "follow up 1 week".   "

## 2024-10-09 DIAGNOSIS — Z71.89 COMPLEX CARE COORDINATION: ICD-10-CM

## 2024-10-13 DIAGNOSIS — E11.43 TYPE 2 DIABETES MELLITUS WITH DIABETIC AUTONOMIC NEUROPATHY, WITHOUT LONG-TERM CURRENT USE OF INSULIN: ICD-10-CM

## 2024-10-14 RX ORDER — GLIMEPIRIDE 2 MG/1
2 TABLET ORAL
Qty: 90 TABLET | Refills: 0 | Status: SHIPPED | OUTPATIENT
Start: 2024-10-14

## 2024-11-11 NOTE — PROGRESS NOTES
"  Bruno Levy presented for a  Medicare AWV and comprehensive Health Risk Assessment today. The following components were reviewed and updated:    Medical history  Family History  Social history  Allergies and Current Medications  Health Risk Assessment  Health Maintenance  Care Team         ** See Completed Assessments for Annual Wellness Visit within the encounter summary.**         The following assessments were completed:  Living Situation  CAGE  Depression Screening  Timed Get Up and Go  Whisper Test  Cognitive Function Screening  Nutrition Screening  ADL Screening  PAQ Screening        Opioid documentationdoes have a current opioid prescription.      Patient accepted further discussion regarding opioid medication use.      Patient is currently taking oxycodone narcotic for back pain.        Pain level today is 6/10.       In addition to narcotic pain medications, patient is also using Baclofen for pain control.       Patient is followed by a specialist currently for their pain and will not be referred today.       Patient's opioid risk potential based on ORT-OUD tool:       Luis each box that applies   No   Yes     Family history of substance abuse   Alcohol [x] []   Illegal drugs [x] []   Rx drugs [x] []     Personal history of substance abuse   Alcohol [x] []   Illegal drugs [x] []   Rx drugs [x] []     Age between 16-45 years   [x]   []     Patient with ADD, OCD, Bipolar disorder, schizoprenia   [x]   []     Patient with depression   []   [x]                         Scoring total                                                        1         Non-opioid treatment options have been discussed today and added to the patient's after visit summary.                   Vitals:    11/14/24 1303   BP: 120/72   BP Location: Left arm   Patient Position: Sitting   Pulse: 78   Resp: 20   Temp: 98.7 °F (37.1 °C)   TempSrc: Oral   SpO2: 98%   Weight: 93.5 kg (206 lb 0.3 oz)   Height: 5' 10" (1.778 m)     Body mass index " is 29.56 kg/m².  Physical Exam  Vitals and nursing note reviewed.   Constitutional:       General: He is awake.      Appearance: Normal appearance.   HENT:      Head: Normocephalic.      Right Ear: Tympanic membrane, ear canal and external ear normal.      Left Ear: Tympanic membrane, ear canal and external ear normal.      Nose: Nose normal.      Mouth/Throat:      Lips: Pink.      Mouth: Mucous membranes are moist.      Pharynx: Oropharynx is clear. Uvula midline.   Cardiovascular:      Rate and Rhythm: Normal rate and regular rhythm.      Heart sounds: Normal heart sounds, S1 normal and S2 normal.   Pulmonary:      Effort: Pulmonary effort is normal. No respiratory distress.      Breath sounds: Normal breath sounds. No decreased breath sounds, wheezing, rhonchi or rales.   Abdominal:      General: Bowel sounds are normal.      Palpations: Abdomen is soft.      Tenderness: There is no abdominal tenderness.   Musculoskeletal:      Cervical back: Normal range of motion.   Skin:     General: Skin is warm.      Capillary Refill: Capillary refill takes less than 2 seconds.   Neurological:      Mental Status: He is alert and oriented to person, place, and time.   Psychiatric:         Thought Content: Thought content does not include homicidal or suicidal ideation. Thought content does not include homicidal or suicidal plan.           Diagnoses and health risks identified today and associated recommendations/orders:    Problem List Items Addressed This Visit       Type 2 diabetes mellitus with neurologic complication, without long-term current use of insulin      Goal is less than 7. Continue current meds and low carb/no concentrated sweets diet with increased exercise as tolerated. Will follow up with lab results. Patient to follow up in 3 months or as needed.          Lumbar disc disease     Lumbar disc disease with the lumbar fusion in the past.           Essential hypertension     Monitor BP daily and keep BP daily  "log to bring to next visit. Exercise at least 5 times a week. Keep scheduled appts. RTC sooner if BP is staying <120/80. Dash diet.    DASH- includes foods rich in K+, calcium and Magnesium.The diet limits foods high in sodium, saturated fats and added sugars. This is a flexible balanced eating plan that helps create heart healthy eating style for life. Diet is rich in vegetable, whole grains and fruits. It includes fat free or low fat dairy products, fish, poultry, nuts. Chose foods high in K+, calcium, magnesium, fiber, protein, and low in saturated fats and sodium.          Hyperlipidemia      Goal LDL is less than 70. Continue current meds and low fat/low cholesterol diet with increased exercise as tolerated. Will follow up with labs. Patient to follow up in 3 months or as needed    A few changes in your diet can reduce cholesterol and improve your heart health. Saturated fats, found primarily in red meat and full-fat dairy products, raise your total cholesterol. Decreasing your consumption of saturated fats can reduce your low-density lipoprotein (LDL) cholesterol. rans fats, sometimes listed on food labels as "partially hydrogenated vegetable oil," are often used in margarines and store-bought cookies, crackers and cakes.     Trans fats raise overall cholesterol levels. Omega-3 fatty acids don't affect LDL cholesterol. But they have other heart-healthy benefits, including reducing blood pressure. Foods with omega-3 fatty acids include salmon, mackerel, herring, walnuts and flaxseeds.Soluble fiber can reduce the absorption of cholesterol into your bloodstream. Soluble fiber is found in such foods as oatmeal, kidney beans, [place] sprouts, apples and pears.  at least 30 minutes of exercise five times a week or vigorous aerobic activity for 20 minutes three times a week if tolerated.           Depression, recurrent     Patient is currently stable patient does not want any treatment at this time.  Recommend that " we use Celexa once daily         Neuropathy     Neuropathy currently stable.  Will continue him on gabapentin.  Will also control his diabetes which may be contributing to his neuropathy.  Follow-up every 6 months.         Peripheral vascular disease, unspecified     Continue current regimen         Encounter for subsequent annual wellness visit (AWV) in Medicare patient - Primary     Physical assessment WNL         BMI 29.0-29.9,adult     Diet and exercise discussed with pt         Abnormality of gait and mobility     Pt is stable at present         Encounter for screening for malignant neoplasm of prostate     PSA today         Relevant Orders    PSA, Screening       Provided Bruno with a 5-10 year written screening schedule and personal prevention plan. Recommendations were developed using the USPSTF age appropriate recommendations. Education, counseling, and referrals were provided as needed. After Visit Summary printed and given to patient which includes a list of additional screenings\tests needed.    Follow up for yearly annual wellness visit  Declined Colonoscopy, LDCT, and all vaccines today    I offered to discuss advanced care planning, including how to pick a person who would make decisions for you if you were unable to make them for yourself, called a health care power of , and what kind of decisions you might make such as use of life sustaining treatments such as ventilators and tube feeding when faced with a life limiting illness recorded on a living will that they will need to know. (How you want to be cared for as you near the end of your natural life)     X Patient is interested in learning more about how to make advanced directives.  I provided them paperwork and offered to discuss this with them.

## 2024-11-14 ENCOUNTER — OFFICE VISIT (OUTPATIENT)
Dept: FAMILY MEDICINE | Facility: CLINIC | Age: 54
End: 2024-11-14
Payer: MEDICARE

## 2024-11-14 VITALS
TEMPERATURE: 99 F | RESPIRATION RATE: 20 BRPM | HEIGHT: 70 IN | OXYGEN SATURATION: 98 % | HEART RATE: 78 BPM | WEIGHT: 206 LBS | DIASTOLIC BLOOD PRESSURE: 72 MMHG | SYSTOLIC BLOOD PRESSURE: 120 MMHG | BODY MASS INDEX: 29.49 KG/M2

## 2024-11-14 DIAGNOSIS — E78.2 MIXED HYPERLIPIDEMIA: ICD-10-CM

## 2024-11-14 DIAGNOSIS — E11.43 TYPE 2 DIABETES MELLITUS WITH DIABETIC AUTONOMIC NEUROPATHY, WITHOUT LONG-TERM CURRENT USE OF INSULIN: ICD-10-CM

## 2024-11-14 DIAGNOSIS — I10 ESSENTIAL HYPERTENSION: ICD-10-CM

## 2024-11-14 DIAGNOSIS — Z12.5 ENCOUNTER FOR SCREENING FOR MALIGNANT NEOPLASM OF PROSTATE: ICD-10-CM

## 2024-11-14 DIAGNOSIS — M51.9 LUMBAR DISC DISEASE: ICD-10-CM

## 2024-11-14 DIAGNOSIS — I73.9 PERIPHERAL VASCULAR DISEASE, UNSPECIFIED: ICD-10-CM

## 2024-11-14 DIAGNOSIS — R26.9 ABNORMALITY OF GAIT AND MOBILITY: ICD-10-CM

## 2024-11-14 DIAGNOSIS — Z00.00 ENCOUNTER FOR SUBSEQUENT ANNUAL WELLNESS VISIT (AWV) IN MEDICARE PATIENT: Primary | ICD-10-CM

## 2024-11-14 DIAGNOSIS — G62.9 NEUROPATHY: ICD-10-CM

## 2024-11-14 DIAGNOSIS — F33.9 DEPRESSION, RECURRENT: ICD-10-CM

## 2024-11-14 PROCEDURE — 3051F HG A1C>EQUAL 7.0%<8.0%: CPT | Mod: ,,,

## 2024-11-14 PROCEDURE — 3066F NEPHROPATHY DOC TX: CPT | Mod: ,,,

## 2024-11-14 PROCEDURE — 1159F MED LIST DOCD IN RCRD: CPT | Mod: ,,,

## 2024-11-14 PROCEDURE — G0439 PPPS, SUBSEQ VISIT: HCPCS | Mod: ,,,

## 2024-11-14 PROCEDURE — 3060F POS MICROALBUMINURIA REV: CPT | Mod: ,,,

## 2024-11-14 PROCEDURE — 3074F SYST BP LT 130 MM HG: CPT | Mod: ,,,

## 2024-11-14 PROCEDURE — 3078F DIAST BP <80 MM HG: CPT | Mod: ,,,

## 2024-11-14 NOTE — PATIENT INSTRUCTIONS
Counseling and Referral of Other Preventative  (Italic type indicates deductible and co-insurance are waived)    Patient Name: Bruno Levy  Today's Date: 11/14/2024    Health Maintenance       Date Due Completion Date    HIV Screening Never done ---    Colorectal Cancer Screening Never done ---    Pneumococcal Vaccines (Age 0-64) (2 of 2 - PCV) 08/28/2016 8/28/2015    LDCT Lung Screen 06/09/2020 4/9/2019    Shingles Vaccine (1 of 2) Never done ---    Foot Exam 03/17/2024 3/17/2023    COVID-19 Vaccine (1 - 2024-25 season) Never done ---    PROSTATE-SPECIFIC ANTIGEN 10/18/2024 10/18/2023    Eye Exam 01/22/2025 1/22/2024    Hemoglobin A1c 01/11/2025 7/11/2024    Diabetes Urine Screening 04/23/2025 4/23/2024    Lipid Panel 07/11/2025 7/11/2024    Low Dose Statin 09/20/2025 9/20/2024    TETANUS VACCINE 08/04/2033 8/4/2023    RSV Vaccine (Age 60+ and Pregnant patients) (1 - 1-dose 75+ series) 06/09/2045 ---        No orders of the defined types were placed in this encounter.      The following information is provided to all patients.  This information is to help you find resources for any of the problems found today that may be affecting your health:                  Living healthy guide: ms.gov    Understanding Diabetes: www.diabetes.org      Eating healthy: www.cdc.gov/healthyweight      CDC home safety checklist: www.cdc.gov/steadi/patient.html      Agency on Aging: ms.gov    Alcoholics anonymous (AA): www.aa.org      Physical Activity: www.iglesia.nih.gov/fx3pewh      Tobacco use: ms.gov

## 2024-11-15 NOTE — ASSESSMENT & PLAN NOTE
Goal is less than 7. Continue current meds and low carb/no concentrated sweets diet with increased exercise as tolerated. Will follow up with lab results. Patient to follow up in 3 months or as needed.

## 2024-11-15 NOTE — ASSESSMENT & PLAN NOTE
" Goal LDL is less than 70. Continue current meds and low fat/low cholesterol diet with increased exercise as tolerated. Will follow up with labs. Patient to follow up in 3 months or as needed    A few changes in your diet can reduce cholesterol and improve your heart health. Saturated fats, found primarily in red meat and full-fat dairy products, raise your total cholesterol. Decreasing your consumption of saturated fats can reduce your low-density lipoprotein (LDL) cholesterol. rans fats, sometimes listed on food labels as "partially hydrogenated vegetable oil," are often used in margarines and store-bought cookies, crackers and cakes.     Trans fats raise overall cholesterol levels. Omega-3 fatty acids don't affect LDL cholesterol. But they have other heart-healthy benefits, including reducing blood pressure. Foods with omega-3 fatty acids include salmon, mackerel, herring, walnuts and flaxseeds.Soluble fiber can reduce the absorption of cholesterol into your bloodstream. Soluble fiber is found in such foods as oatmeal, kidney beans, [place] sprouts, apples and pears.  at least 30 minutes of exercise five times a week or vigorous aerobic activity for 20 minutes three times a week if tolerated.    "

## 2024-11-15 NOTE — TELEPHONE ENCOUNTER
"Please see the attached refill request. Pt seen yesterday for AWV, last A1C 07/11/2024, upcoming appt 1/14/2025, "pt requesting rx go to [facility]"  "

## 2024-11-18 RX ORDER — METFORMIN HYDROCHLORIDE 500 MG/1
TABLET, EXTENDED RELEASE ORAL
Qty: 360 TABLET | Refills: 1 | Status: SHIPPED | OUTPATIENT
Start: 2024-11-18

## 2025-01-03 ENCOUNTER — OFFICE VISIT (OUTPATIENT)
Dept: FAMILY MEDICINE | Facility: CLINIC | Age: 55
End: 2025-01-03
Payer: MEDICARE

## 2025-01-03 VITALS
HEART RATE: 92 BPM | OXYGEN SATURATION: 96 % | WEIGHT: 210 LBS | TEMPERATURE: 98 F | HEIGHT: 70 IN | RESPIRATION RATE: 20 BRPM | DIASTOLIC BLOOD PRESSURE: 72 MMHG | SYSTOLIC BLOOD PRESSURE: 112 MMHG | BODY MASS INDEX: 30.06 KG/M2

## 2025-01-03 DIAGNOSIS — J02.9 SORE THROAT: ICD-10-CM

## 2025-01-03 DIAGNOSIS — R43.2 LOSS OF TASTE: ICD-10-CM

## 2025-01-03 DIAGNOSIS — J01.00 ACUTE MAXILLARY SINUSITIS, RECURRENCE NOT SPECIFIED: Primary | ICD-10-CM

## 2025-01-03 DIAGNOSIS — E11.43 TYPE 2 DIABETES MELLITUS WITH DIABETIC AUTONOMIC NEUROPATHY, WITHOUT LONG-TERM CURRENT USE OF INSULIN: ICD-10-CM

## 2025-01-03 DIAGNOSIS — L97.426 NON-PRESSURE CHRONIC ULCER OF LEFT HEEL AND MIDFOOT WITH BONE INVOLVEMENT WITHOUT EVIDENCE OF NECROSIS: ICD-10-CM

## 2025-01-03 DIAGNOSIS — R09.81 NASAL CONGESTION: ICD-10-CM

## 2025-01-03 LAB
CTP QC/QA: YES
MOLECULAR STREP A: NEGATIVE
POC MOLECULAR INFLUENZA A AGN: NEGATIVE
POC MOLECULAR INFLUENZA B AGN: NEGATIVE
SARS-COV-2 RDRP RESP QL NAA+PROBE: NEGATIVE

## 2025-01-03 PROCEDURE — 3074F SYST BP LT 130 MM HG: CPT | Mod: ,,,

## 2025-01-03 PROCEDURE — 87635 SARS-COV-2 COVID-19 AMP PRB: CPT | Mod: RHCUB

## 2025-01-03 PROCEDURE — 1160F RVW MEDS BY RX/DR IN RCRD: CPT | Mod: ,,,

## 2025-01-03 PROCEDURE — 87651 STREP A DNA AMP PROBE: CPT | Mod: RHCUB

## 2025-01-03 PROCEDURE — 1159F MED LIST DOCD IN RCRD: CPT | Mod: ,,,

## 2025-01-03 PROCEDURE — 96372 THER/PROPH/DIAG INJ SC/IM: CPT | Mod: ,,,

## 2025-01-03 PROCEDURE — 99214 OFFICE O/P EST MOD 30 MIN: CPT | Mod: 25,,,

## 2025-01-03 PROCEDURE — 3008F BODY MASS INDEX DOCD: CPT | Mod: ,,,

## 2025-01-03 PROCEDURE — 3078F DIAST BP <80 MM HG: CPT | Mod: ,,,

## 2025-01-03 PROCEDURE — 87502 INFLUENZA DNA AMP PROBE: CPT | Mod: RHCUB

## 2025-01-03 RX ORDER — CEFTRIAXONE 1 G/1
1 INJECTION, POWDER, FOR SOLUTION INTRAMUSCULAR; INTRAVENOUS
Status: COMPLETED | OUTPATIENT
Start: 2025-01-03 | End: 2025-01-03

## 2025-01-03 RX ORDER — CHLORPHENIRAMINE MALEATE AND PHENYLEPHRINE HYDROCHLORIDE 4; 10 MG/1; MG/1
1 TABLET, COATED ORAL EVERY 4 HOURS PRN
Qty: 30 TABLET | Refills: 0 | Status: SHIPPED | OUTPATIENT
Start: 2025-01-03 | End: 2025-01-13

## 2025-01-03 RX ORDER — DEXAMETHASONE SODIUM PHOSPHATE 4 MG/ML
4 INJECTION, SOLUTION INTRA-ARTICULAR; INTRALESIONAL; INTRAMUSCULAR; INTRAVENOUS; SOFT TISSUE
Status: COMPLETED | OUTPATIENT
Start: 2025-01-03 | End: 2025-01-03

## 2025-01-03 RX ORDER — AMOXICILLIN AND CLAVULANATE POTASSIUM 875; 125 MG/1; MG/1
1 TABLET, FILM COATED ORAL EVERY 12 HOURS
Qty: 20 TABLET | Refills: 0 | Status: SHIPPED | OUTPATIENT
Start: 2025-01-03 | End: 2025-01-13

## 2025-01-03 RX ADMIN — CEFTRIAXONE 1 G: 1 INJECTION, POWDER, FOR SOLUTION INTRAMUSCULAR; INTRAVENOUS at 03:01

## 2025-01-03 RX ADMIN — DEXAMETHASONE SODIUM PHOSPHATE 4 MG: 4 INJECTION, SOLUTION INTRA-ARTICULAR; INTRALESIONAL; INTRAMUSCULAR; INTRAVENOUS; SOFT TISSUE at 03:01

## 2025-01-06 PROBLEM — L97.426 NON-PRESSURE CHRONIC ULCER OF LEFT HEEL AND MIDFOOT WITH BONE INVOLVEMENT WITHOUT EVIDENCE OF NECROSIS: Status: ACTIVE | Noted: 2023-03-17

## 2025-01-06 PROBLEM — J01.00 ACUTE MAXILLARY SINUSITIS: Status: ACTIVE | Noted: 2025-01-06

## 2025-01-06 NOTE — PROGRESS NOTES
HAYLEY RIOS   [facility]  [address] [address] [address]  [phone]      PATIENT NAME: Bruno Levy  : 1970  DATE: 1/3/25  MRN: [id number]      Billing Provider: HAYLEY RIOS  Level of Service: [place] OFFICE/OUTPT VISIT, EST, LEVL IV, 30-39 MIN  Patient PCP Information       Provider PCP Type    HAYLEY RIOS General                Medications and Allergies     Medications  Outpatient Medications Marked as Taking for the 1/3/25 encounter (Office Visit) with Franklyn Acosta FNP   Medication Sig Dispense Refill    amLODIPine (NORVASC) 5 MG tablet Take 2 tablets (10 mg total) by mouth once daily. 180 tablet 1    atorvastatin (LIPITOR) 40 MG tablet Take 1 tablet (40 mg total) by mouth once daily. 90 tablet 1    baclofen (LIORESAL) 20 MG tablet Take 20 mg by mouth every 6 (six) hours as needed.      blood sugar diagnostic Strp 1 strip by Misc.(Non-Drug; Combo Route) route once daily. 100 each 1    blood-glucose meter (TRUE METRIX AIR GLUCOSE METER) Misc 1 each by Other route 4 (four) times daily before meals and nightly. 1 each 0    gabapentin (NEURONTIN) 600 MG tablet 1.5 tablets every 6 hours      glimepiride (AMARYL) 2 MG tablet Take 1 tablet (2 mg total) by mouth before breakfast. 90 tablet 0    lancets (TRUEPLUS LANCETS) 33 gauge Misc 1 lancet  by Other route before meals, at bedtime and at 0200. 100 each 0    meclizine (ANTIVERT) 25 mg tablet Take 1 tablet (25 mg total) by mouth 3 (three) times daily as needed for Dizziness. 90 tablet 0    metFORMIN (GLUCOPHAGE-XR) 500 MG ER 24hr tablet TAKE 2 TABLETS TWICE DAILY WITH MEALS 360 tablet 1    oxyCODONE-acetaminophen (PERCOCET) 7.5-325 mg per tablet Take 1 tablet by mouth every 8 (eight) hours as needed.      traZODone (DESYREL) 100 MG tablet Take 1 tablet (100 mg total) by mouth every evening. 30 tablet 2       Allergies  Review of patient's allergies indicates:   Allergen Reactions     Levofloxacin (bulk) Other (See Comments)     Worsened muscle spasms       History of Present Illness    CHIEF COMPLAINT:  Patient presents today with sinus congestion and loss of taste.    HISTORY OF PRESENT ILLNESS:  His symptoms began the day after [place] with mild general malaise. He developed nasal congestion with rhinorrhea, and subsequently experienced complete loss of taste starting Tuesday. He reports his nose is either congested or running with significant post-nasal drainage. He experiences constant scratchy throat and headaches.      ROS:  General: -fever, -chills, -fatigue, -weight gain, -weight loss  Eyes: -vision changes, -redness, -discharge  ENT: -ear pain, +nasal congestion, -sore throat, +loss of taste  Cardiovascular: -chest pain, -palpitations, -lower extremity edema  Respiratory: -cough, -shortness of breath  Gastrointestinal: -abdominal pain, -nausea, -vomiting, -diarrhea, -constipation, -blood in stool  Genitourinary: -dysuria, -hematuria, -frequency  Musculoskeletal: -joint pain, -muscle pain  Skin: -rash, -lesion  Neurological: +headache, -dizziness, -numbness, -tingling  Psychiatric: -anxiety, -depression, -sleep difficulty          Physical Exam    General: No acute distress. Well-developed. Well-nourished.  Eyes: EOMI. Sclerae anicteric.  HENT: Normocephalic. Atraumatic. Nares patent. Moist oral mucosa.  Pharyngeal erythema. Post-nasal drainage.Tender maxillary sinus, congestion,   Ears: Bilateral TMs clear. Bilateral EACs clear.  Cardiovascular: Regular rate. Regular rhythm. No murmurs. No rubs. No gallops. Normal S1, S2.  Respiratory: Normal respiratory effort. Clear to auscultation bilaterally. No rales. No rhonchi. No wheezing.  Abdomen: Soft. Non-tender. Non-distended. Normoactive bowel sounds.  Musculoskeletal: No  obvious deformity.  Extremities: No lower extremity edema.  Neurological: Alert & oriented x3. No slurred speech. Normal gait.  Psychiatric: Normal mood. Normal  affect. Good insight. Good judgment.  Skin: Warm. Dry. No rash.          Assessment & Plan    IMPRESSION:  - Assessed patient with symptoms of upper respiratory infection including nasal congestion, loss of taste, headaches, sore throat, and post-nasal drip  - Examined throat, noting significant redness and drainage  - Determined antibiotic treatment necessary due to duration and severity of symptoms  - Considered additional symptomatic treatment for nasal congestion    ACUTE SINUS INFECTION:  - Administered antibiotic injection in the office.  - Prescribed oral antibiotic to begin the following day.  - Instructed the patient to follow up if symptoms do not improve.  - Noted that the patient started feeling unwell the day after [place], with symptoms worsening on Tuesday.  - Examined the patient's throat, which appeared red with significant drainage.  - Auscultated the patient's lungs and heart, which sounded normal.  - Noted that patient reports loss of taste.  - Observed red throat with significant drainage during exam.  - Acknowledged that the throat appears painful.  - Noted that patient reports a scratchy throat.  - Observed significant drainage in the throat during exam.    NASAL CONGESTION:  - Prescribed antihistamine to alleviate nasal congestion.  - Prescribed antihistamine for nasal congestion.  - Noted that patient reports nasal congestion starting on Tuesday.  - Prescribed antihistamine to help alleviate postnasal drip.    FOLLOW UP:  - Instructed the patient to follow up if symptoms do not improve.          Health Maintenance Due   Topic Date Due    HIV Screening  Never done    Colorectal Cancer Screening  Never done    Pneumococcal Vaccines (Age 50+) (2 of 2 - PCV) 08/28/2016    LDCT Lung Screen  06/09/2020    Shingles Vaccine (1 of 2) Never done    Foot Exam  03/17/2024    COVID-19 Vaccine (1 - 2024-25 season) Never done    PROSTATE-SPECIFIC ANTIGEN  10/18/2024    Hemoglobin A1c  01/11/2025    Eye  Exam  01/22/2025       Problem List Items Addressed This Visit       Type 2 diabetes mellitus with neurologic complication, without long-term current use of insulin    Current Assessment & Plan      Goal is less than 7. Continue current meds and low carb/no concentrated sweets diet with increased exercise as tolerated. Will follow up with lab results. Patient to follow up in 3 months or as needed.          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 Augmentin RX. Medication instructions and education given with understanding voiced. Rest, increase fluids. OTC antihistamines, decongestants, Ibuprofen, Tylenol as needed. RTC with worsening, new or persistent symptoms.           Relevant Medications    amoxicillin-clavulanate 875-125mg (AUGMENTIN) 875-125 mg per tablet    chlorpheniramine-phenylephrine (ED A-HIST) 4-10 mg per tablet     Other Visit Diagnoses       Sore throat        Relevant Orders    POCT COVID-19 Rapid Screening (Completed)    POCT Influenza A/B Molecular (Completed)    POCT Strep A, Molecular (Completed)    Loss of taste        Relevant Orders    POCT COVID-19 Rapid Screening (Completed)    Nasal congestion        Relevant Orders    POCT COVID-19 Rapid Screening (Completed)    POCT Influenza A/B Molecular (Completed)    POCT Strep A, Molecular (Completed)            Health Maintenance Topics with due status: Not Due       Topic Last Completion Date    TETANUS VACCINE 08/04/2023    Diabetes Urine Screening 04/23/2024    Lipid Panel 07/11/2024    Low Dose Statin 01/03/2025    RSV Vaccine (Age 60+ and Pregnant patients) Not Due       Future Appointments   Date Time Provider Department Center   1/14/2025  8:00 AM Franklyn Acosta FNP [facility] [place]   2/27/2025  3:00 PM Mayela Tipton [facility] AUDIO [facility]   2/27/2025  3:30 PM  Jack Barrow MD [facility]   11/13/2025  1:00 PM AWV NURSE [facility]        This note was generated with the assistance of ambient listening technology. Verbal consent was obtained by the patient and accompanying visitor(s) for the recording of patient appointment to facilitate this note. I attest to having reviewed and edited the generated note for accuracy, though some syntax or spelling errors may persist. Please contact the author of this note for any clarification.     Signature:  HAYLEY RIOS  [facility]  [address] [address] [address]  [phone]    Date of encounter: 1/3/25    Answers submitted by the patient for this visit:  Sore Throat Questionnaire (Submitted on 1/3/2025)  Chief Complaint: Sore throat  Chronicity: new  Onset: in the past 7 days  Progression since onset: gradually worsening  Pain worse on: neither  Fever: no fever  Fever duration: less than 1 day  Pain - numeric: 3/10  abdominal pain: No  congestion: Yes  cough: No  diarrhea: No  drooling: No  ear discharge: No  ear pain: No  headaches: Yes  hoarse voice: Yes  neck pain: Yes  plugged ear sensation: Yes  stridor: No  shortness of breath: No  swollen glands: No  trouble swallowing: Yes  vomiting: No  Treatments tried: NSAIDs, acetaminophen, oral narcotic analgesics  Improvement on treatment: no relief  Pain severity: mild

## 2025-01-14 ENCOUNTER — OFFICE VISIT (OUTPATIENT)
Dept: FAMILY MEDICINE | Facility: CLINIC | Age: 55
End: 2025-01-14
Payer: MEDICARE

## 2025-01-14 VITALS
BODY MASS INDEX: 29.54 KG/M2 | RESPIRATION RATE: 19 BRPM | HEIGHT: 70 IN | HEART RATE: 76 BPM | WEIGHT: 206.31 LBS | SYSTOLIC BLOOD PRESSURE: 128 MMHG | DIASTOLIC BLOOD PRESSURE: 82 MMHG | TEMPERATURE: 98 F

## 2025-01-14 DIAGNOSIS — J01.00 ACUTE MAXILLARY SINUSITIS, RECURRENCE NOT SPECIFIED: ICD-10-CM

## 2025-01-14 DIAGNOSIS — I10 ESSENTIAL HYPERTENSION: Primary | ICD-10-CM

## 2025-01-14 DIAGNOSIS — M25.531 BILATERAL WRIST PAIN: ICD-10-CM

## 2025-01-14 DIAGNOSIS — E11.43 TYPE 2 DIABETES MELLITUS WITH DIABETIC AUTONOMIC NEUROPATHY, WITHOUT LONG-TERM CURRENT USE OF INSULIN: ICD-10-CM

## 2025-01-14 DIAGNOSIS — J30.2 SEASONAL ALLERGIES: ICD-10-CM

## 2025-01-14 DIAGNOSIS — E78.2 MIXED HYPERLIPIDEMIA: ICD-10-CM

## 2025-01-14 DIAGNOSIS — M25.532 BILATERAL WRIST PAIN: ICD-10-CM

## 2025-01-14 DIAGNOSIS — F51.01 PRIMARY INSOMNIA: ICD-10-CM

## 2025-01-14 LAB
ALBUMIN SERPL BCP-MCNC: 4.2 G/DL (ref 3.5–5)
ALBUMIN/GLOB SERPL: 1.2 {RATIO}
ALP SERPL-CCNC: 78 U/L (ref 40–150)
ALT SERPL W P-5'-P-CCNC: 34 U/L
ANION GAP SERPL CALCULATED.3IONS-SCNC: 13 MMOL/L (ref 7–16)
AST SERPL W P-5'-P-CCNC: 65 U/L (ref 5–34)
BILIRUB SERPL-MCNC: 0.7 MG/DL
BUN SERPL-MCNC: 13 MG/DL (ref 8–26)
BUN/CREAT SERPL: 16 (ref 6–20)
CALCIUM SERPL-MCNC: 9.3 MG/DL (ref 8.4–10.2)
CHLORIDE SERPL-SCNC: 103 MMOL/L (ref 98–107)
CHOLEST SERPL-MCNC: 117 MG/DL
CHOLEST/HDLC SERPL: 3.3 {RATIO}
CO2 SERPL-SCNC: 27 MMOL/L (ref 22–29)
CREAT SERPL-MCNC: 0.8 MG/DL (ref 0.72–1.25)
EGFR (NO RACE VARIABLE) (RUSH/TITUS): 105 ML/MIN/1.73M2
EST. AVERAGE GLUCOSE BLD GHB EST-MCNC: 163 MG/DL
GLOBULIN SER-MCNC: 3.4 G/DL (ref 2–4)
GLUCOSE SERPL-MCNC: 162 MG/DL (ref 74–100)
HBA1C MFR BLD HPLC: 7.3 %
HDLC SERPL-MCNC: 35 MG/DL (ref 35–60)
LDLC SERPL CALC-MCNC: 55 MG/DL
LDLC/HDLC SERPL: 1.6 {RATIO}
NONHDLC SERPL-MCNC: 82 MG/DL
POTASSIUM SERPL-SCNC: 4.1 MMOL/L (ref 3.5–5.1)
PROT SERPL-MCNC: 7.6 G/DL (ref 6.4–8.3)
SODIUM SERPL-SCNC: 139 MMOL/L (ref 136–145)
TRIGL SERPL-MCNC: 134 MG/DL (ref 34–140)
VLDLC SERPL-MCNC: 27 MG/DL

## 2025-01-14 PROCEDURE — 3008F BODY MASS INDEX DOCD: CPT | Mod: ,,,

## 2025-01-14 PROCEDURE — 80061 LIPID PANEL: CPT | Mod: ,,, | Performed by: CLINICAL MEDICAL LABORATORY

## 2025-01-14 PROCEDURE — 1159F MED LIST DOCD IN RCRD: CPT | Mod: ,,,

## 2025-01-14 PROCEDURE — 99214 OFFICE O/P EST MOD 30 MIN: CPT | Mod: ,,,

## 2025-01-14 PROCEDURE — 3074F SYST BP LT 130 MM HG: CPT | Mod: ,,,

## 2025-01-14 PROCEDURE — 1160F RVW MEDS BY RX/DR IN RCRD: CPT | Mod: ,,,

## 2025-01-14 PROCEDURE — 3079F DIAST BP 80-89 MM HG: CPT | Mod: ,,,

## 2025-01-14 PROCEDURE — 83036 HEMOGLOBIN GLYCOSYLATED A1C: CPT | Mod: ,,, | Performed by: CLINICAL MEDICAL LABORATORY

## 2025-01-14 PROCEDURE — 80053 COMPREHEN METABOLIC PANEL: CPT | Mod: ,,, | Performed by: CLINICAL MEDICAL LABORATORY

## 2025-01-14 RX ORDER — AMLODIPINE BESYLATE 5 MG/1
10 TABLET ORAL DAILY
Qty: 180 TABLET | Refills: 1 | Status: SHIPPED | OUTPATIENT
Start: 2025-01-14

## 2025-01-14 RX ORDER — ATORVASTATIN CALCIUM 40 MG/1
40 TABLET, FILM COATED ORAL DAILY
Qty: 90 TABLET | Refills: 1 | Status: SHIPPED | OUTPATIENT
Start: 2025-01-14

## 2025-01-14 RX ORDER — GLIMEPIRIDE 2 MG/1
2 TABLET ORAL
Qty: 90 TABLET | Refills: 0 | Status: SHIPPED | OUTPATIENT
Start: 2025-01-14

## 2025-01-14 RX ORDER — TRAZODONE HYDROCHLORIDE 100 MG/1
100 TABLET ORAL NIGHTLY
Qty: 90 TABLET | Refills: 0 | Status: SHIPPED | OUTPATIENT
Start: 2025-01-14

## 2025-01-14 RX ORDER — FLUTICASONE PROPIONATE 50 MCG
1 SPRAY, SUSPENSION (ML) NASAL DAILY
Qty: 16 G | Refills: 0 | Status: SHIPPED | OUTPATIENT
Start: 2025-01-14

## 2025-01-14 RX ORDER — CHLORPHENIRAMINE MALEATE AND PHENYLEPHRINE HYDROCHLORIDE 4; 10 MG/1; MG/1
1 TABLET, COATED ORAL EVERY 4 HOURS PRN
Qty: 30 TABLET | Refills: 0 | Status: SHIPPED | OUTPATIENT
Start: 2025-01-14 | End: 2025-01-24

## 2025-01-14 NOTE — ASSESSMENT & PLAN NOTE
"Lipid panel obtained at today's visit. Goal LDL is less than 70. Continue current meds and low fat/low cholesterol diet with increased exercise as tolerated. Will follow up with labs. Patient to follow up in 3 months or as needed    A few changes in your diet can reduce cholesterol and improve your heart health. Saturated fats, found primarily in red meat and full-fat dairy products, raise your total cholesterol. Decreasing your consumption of saturated fats can reduce your low-density lipoprotein (LDL) cholesterol. rans fats, sometimes listed on food labels as "partially hydrogenated vegetable oil," are often used in margarines and store-bought cookies, crackers and cakes.     Trans fats raise overall cholesterol levels. Omega-3 fatty acids don't affect LDL cholesterol. But they have other heart-healthy benefits, including reducing blood pressure. Foods with omega-3 fatty acids include salmon, mackerel, herring, walnuts and flaxseeds.Soluble fiber can reduce the absorption of cholesterol into your bloodstream. Soluble fiber is found in such foods as oatmeal, kidney beans, [place] sprouts, apples and pears.  at least 30 minutes of exercise five times a week or vigorous aerobic activity for 20 minutes three times a week if tolerated.    "

## 2025-01-14 NOTE — PROGRESS NOTES
HAYLEY RIOS   [facility]  [id number] [place] [id number]  [phone]      PATIENT NAME: Bruno Levy  : 1970  DATE: 25  MRN: [id number]      Billing Provider: HAYLEY RIOS  Level of Service: [place] OFFICE/OUTPT VISIT, EST, LEVL IV, 30-39 MIN  Patient PCP Information       Provider PCP Type    HAYLEY RIOS General            Chief Complaint   Patient presents with    Nasal Congestion     Bruno Levy 54 year old c/m presents for a 3 month follow up with labs and medication refills. He was seen 2025 for Acute maxillary sinusitis. Reports he much better , but is still having nasal congestion and nasal drainage. Head is still congested some , but is less than it was.    Health Maintenance     HIV Screening declined  Colorectal Cancer Screening declined  Pneumococcal Vaccines  declined  LDCT Lung Screen due on 2020  Shingles Vaccine(1 of 2) Never done  Foot Exam accepted has appointment with Dr. Sin in April  COVID-19 Vaccine(1 - 2024-25 season) Never done  PROSTATE-SPECIFIC ANTIGEN declined  Hemoglobin A1c due ordered  Diabetic Eye Exam appointment scheduled [facility] next week      Hyperlipidemia     Labs and refills needed    Diabetes     Labs and refills needed    Insomnia         Medications and Allergies     Medications  Outpatient Medications Marked as Taking for the 25 encounter (Office Visit) with Franklyn Acosta FNP   Medication Sig Dispense Refill    baclofen (LIORESAL) 20 MG tablet Take 20 mg by mouth every 6 (six) hours as needed.      blood sugar diagnostic Strp 1 strip by Misc.(Non-Drug; Combo Route) route once daily. 100 each 1    blood-glucose meter (TRUE METRIX AIR GLUCOSE METER) Misc 1 each by Other route 4 (four) times daily before meals and nightly. 1 each 0    gabapentin (NEURONTIN) 600 MG tablet 1.5 tablets every 6 hours      lancets (TRUEPLUS LANCETS) 33 gauge Misc 1 lancet  by Other route  before meals, at bedtime and at 0200. 100 each 0    meclizine (ANTIVERT) 25 mg tablet Take 1 tablet (25 mg total) by mouth 3 (three) times daily as needed for Dizziness. 90 tablet 0    metFORMIN (GLUCOPHAGE-XR) 500 MG ER 24hr tablet TAKE 2 TABLETS TWICE DAILY WITH MEALS 360 tablet 1    oxyCODONE-acetaminophen (PERCOCET) 7.5-325 mg per tablet Take 1 tablet by mouth every 8 (eight) hours as needed.      [DISCONTINUED] amLODIPine (NORVASC) 5 MG tablet Take 2 tablets (10 mg total) by mouth once daily. 180 tablet 1    [DISCONTINUED] atorvastatin (LIPITOR) 40 MG tablet Take 1 tablet (40 mg total) by mouth once daily. 90 tablet 1    [DISCONTINUED] glimepiride (AMARYL) 2 MG tablet Take 1 tablet (2 mg total) by mouth before breakfast. 90 tablet 0    [DISCONTINUED] traZODone (DESYREL) 100 MG tablet Take 1 tablet (100 mg total) by mouth every evening. 30 tablet 2       Allergies  Review of patient's allergies indicates:   Allergen Reactions    Levofloxacin (bulk) Other (See Comments)     Worsened muscle spasms       History of Present Illness    CHIEF COMPLAINT:  Patient presents today for follow up, medication refills and lab work.    ALLERGIC RHINITIS:  He reports nasal congestion alternating with rhinorrhea and running eyes. His symptoms improve with antihistamine use. He is currently using store brand nasal spray for symptom management.    DIZZINESS:  He reports improvement in dizziness symptoms with reduced computer usage.         Review of Systems   Constitutional:  Negative for chills, fatigue and fever.   HENT:  Negative for congestion, ear discharge, ear pain, sinus pressure, sinus pain and sore throat.    Respiratory:  Negative for cough, chest tightness, shortness of breath and wheezing.    Cardiovascular:  Negative for chest pain and palpitations.   Gastrointestinal:  Negative for abdominal pain, constipation, diarrhea, nausea and vomiting.   Genitourinary:  Negative for dysuria, flank pain and hematuria.    Musculoskeletal:  Positive for joint swelling (bilateral wrist pain).   Neurological:  Negative for dizziness, weakness, light-headedness and headaches.   Psychiatric/Behavioral:  Negative for suicidal ideas.        Physical Exam  Vitals and nursing note reviewed.   Constitutional:       General: He is awake.      Appearance: Normal appearance.   HENT:      Head: Normocephalic.      Right Ear: Tympanic membrane, ear canal and external ear normal.      Left Ear: Tympanic membrane, ear canal and external ear normal.      Nose: Nose normal.      Mouth/Throat:      Lips: Pink.      Mouth: Mucous membranes are moist.      Pharynx: Oropharynx is clear. Uvula midline.   Cardiovascular:      Rate and Rhythm: Normal rate and regular rhythm.      Heart sounds: Normal heart sounds, S1 normal and S2 normal.   Pulmonary:      Effort: Pulmonary effort is normal. No respiratory distress.      Breath sounds: Normal breath sounds. No decreased breath sounds, wheezing, rhonchi or rales.   Abdominal:      General: Bowel sounds are normal.      Palpations: Abdomen is soft.      Tenderness: There is no abdominal tenderness.   Musculoskeletal:      Cervical back: Normal range of motion.      Comments: Positive Phalen's test bilateral wrist.    Skin:     General: Skin is warm.      Capillary Refill: Capillary refill takes less than 2 seconds.   Neurological:      Mental Status: He is alert and oriented to person, place, and time.   Psychiatric:         Thought Content: Thought content does not include homicidal or suicidal ideation. Thought content does not include homicidal or suicidal plan.         Assessment & Plan    IMPRESSION:  - Assessed patient's symptoms, noting improvement in head pain but persistent nasal congestion and eye irritation  - Evaluated effectiveness of previously prescribed antihistamine  - Reviewed current medication regimen, including Amaryl, Trazodone, Amlodipine, and Metformin  - Will check A1C and cholesterol  levels    DIABETES:  - Ordered A1C test to monitor the patient's glycemic control.  - Acknowledged the patient's current use of Metformin.  - Refilled Amaryl (glimepiride), an oral hypoglycemic medication.      HYPERLIPIDEMIA:  - Ordered cholesterol test to assess cardiovascular risk factors.    HYPERTENSION:  - Refilled Amlodipine, an antihypertensive medication.    ALLERGIC RHINITIS:  - Patient reports nasal congestion and rhinorrhea.  - Prescribed Flonase (fluticasone) nasal spray to help clear nasal congestion.  - Patient reports eye symptoms related to allergic rhinitis.  - Noted improvement with antihistamine medication.  - Continued antihistamine for eye irritation symptoms.  - Planned to prescribe additional antihistamine medication.  .    SLEEP DISORDER:  - Refilled Trazodone, a medication often used for sleep disorders. Symptoms controlled well.     Will call with lab results and any new orders.         Health Maintenance Due   Topic Date Due    HIV Screening  Never done    Colorectal Cancer Screening  Never done    Pneumococcal Vaccines (Age 50+) (2 of 2 - PCV) 08/28/2016    LDCT Lung Screen  06/09/2020    Shingles Vaccine (1 of 2) Never done    Foot Exam  03/17/2024    COVID-19 Vaccine (1 - 2024-25 season) Never done    PROSTATE-SPECIFIC ANTIGEN  10/18/2024    Hemoglobin A1c  01/11/2025    Diabetic Eye Exam  01/22/2025       Problem List Items Addressed This Visit       Type 2 diabetes mellitus with neurologic complication, without long-term current use of insulin    Current Assessment & Plan      Goal is less than 7. Continue current meds and low carb/no concentrated sweets diet with increased exercise as tolerated. Will follow up with lab results. Patient to follow up in 3 months or as needed.          Relevant Medications    glimepiride (AMARYL) 2 MG tablet    Other Relevant Orders    Hemoglobin A1C    Essential hypertension - Primary    Current Assessment & Plan     BP controlled today. Monitor BP  "daily and keep BP daily log to bring to next visit. Exercise at least 5 times a week. Keep scheduled appts. RTC sooner if BP is staying >140/90. Dash diet.    DASH- includes foods rich in K+, calcium and Magnesium.The diet limits foods high in sodium, saturated fats and added sugars. This is a flexible balanced eating plan that helps create heart healthy eating style for life. Diet is rich in vegetable, whole grains and fruits. It includes fat free or low fat dairy products, fish, poultry, nuts. Chose foods high in K+, calcium, magnesium, fiber, protein, and low in saturated fats and sodium.          Relevant Medications    amLODIPine (NORVASC) 5 MG tablet    Other Relevant Orders    Comprehensive Metabolic Panel    Hyperlipidemia    Current Assessment & Plan     Lipid panel obtained at today's visit. Goal LDL is less than 70. Continue current meds and low fat/low cholesterol diet with increased exercise as tolerated. Will follow up with labs. Patient to follow up in 3 months or as needed    A few changes in your diet can reduce cholesterol and improve your heart health. Saturated fats, found primarily in red meat and full-fat dairy products, raise your total cholesterol. Decreasing your consumption of saturated fats can reduce your low-density lipoprotein (LDL) cholesterol. rans fats, sometimes listed on food labels as "partially hydrogenated vegetable oil," are often used in margarines and store-bought cookies, crackers and cakes.     Trans fats raise overall cholesterol levels. Omega-3 fatty acids don't affect LDL cholesterol. But they have other heart-healthy benefits, including reducing blood pressure. Foods with omega-3 fatty acids include salmon, mackerel, herring, walnuts and flaxseeds.Soluble fiber can reduce the absorption of cholesterol into your bloodstream. Soluble fiber is found in such foods as oatmeal, kidney beans, [place] sprouts, apples and pears.  at least 30 minutes of exercise five times a " week or vigorous aerobic activity for 20 minutes three times a week if tolerated.           Relevant Medications    atorvastatin (LIPITOR) 40 MG tablet    Other Relevant Orders    Lipid Panel    Primary insomnia    Current Assessment & Plan     Symptoms controlled well. Continue current medication regimen.      Reviewed treatment plan and medication side effects/risk/benefits/directions on taking medications. Patient was instructed to take medication on a night that they do not have to get up and drive, go to work, etc. to monitor for morning drowsiness. Patient was instructed to return to clinic for follow-up on medication to determine effectiveness. Patient verbalized understanding of treatment plan and denies any questions.         Relevant Medications    traZODone (DESYREL) 100 MG tablet    Acute maxillary sinusitis    Relevant Medications    chlorpheniramine-phenylephrine (ED A-HIST) 4-10 mg per tablet    Seasonal allergies    Current Assessment & Plan     Flonase RX today         Relevant Medications    fluticasone propionate (FLONASE) 50 mcg/actuation nasal spray    Bilateral wrist pain    Current Assessment & Plan     Pt states he has hx of carpal tunnel and he thinks he is about ready to have this fixed. Referral to orthopedics for further evaluation         Relevant Orders    Ambulatory referral/consult to Orthopedics       Health Maintenance Topics with due status: Not Due       Topic Last Completion Date    TETANUS VACCINE 08/04/2023    Diabetes Urine Screening 04/23/2024    Lipid Panel 07/11/2024    Low Dose Statin 01/14/2025    RSV Vaccine (Age 60+ and Pregnant patients) Not Due       Future Appointments   Date Time Provider Department Center   2/3/2025  2:20 PM Rock Rehman MD [facility] ORTHO [facility]   2/27/2025  3:00 PM Mayela Tipton [facility] AUDIO [facility]   2/27/2025  3:30 PM Jack Barrow MD [facility] ENT [facility]   4/14/2025  8:20 AM Franklyn Acosta FNP [facility]    11/13/2025  1:00 PM AW[facility] NURSE [facility]        This note was generated with the assistance of ambient listening technology. Verbal consent was obtained by the patient and accompanying visitor(s) for the recording of patient appointment to facilitate this note. I attest to having reviewed and edited the generated note for accuracy, though some syntax or spelling errors may persist. Please contact the author of this note for any clarification.     Signature:  HAYLEY RIOS  [facility]  [address] [address] [address]  [phone]    Date of encounter: 1/14/25

## 2025-01-14 NOTE — ASSESSMENT & PLAN NOTE
BP controlled today. Monitor BP daily and keep BP daily log to bring to next visit. Exercise at least 5 times a week. Keep scheduled appts. RTC sooner if BP is staying >140/90. Dash diet.    DASH- includes foods rich in K+, calcium and Magnesium.The diet limits foods high in sodium, saturated fats and added sugars. This is a flexible balanced eating plan that helps create heart healthy eating style for life. Diet is rich in vegetable, whole grains and fruits. It includes fat free or low fat dairy products, fish, poultry, nuts. Chose foods high in K+, calcium, magnesium, fiber, protein, and low in saturated fats and sodium.

## 2025-01-14 NOTE — ASSESSMENT & PLAN NOTE
Symptoms controlled well. Continue current medication regimen.      Reviewed treatment plan and medication side effects/risk/benefits/directions on taking medications. Patient was instructed to take medication on a night that they do not have to get up and drive, go to work, etc. to monitor for morning drowsiness. Patient was instructed to return to clinic for follow-up on medication to determine effectiveness. Patient verbalized understanding of treatment plan and denies any questions.

## 2025-01-14 NOTE — ASSESSMENT & PLAN NOTE
Pt states he has hx of carpal tunnel and he thinks he is about ready to have this fixed. Referral to orthopedics for further evaluation

## 2025-01-27 LAB
LEFT EYE DM RETINOPATHY: NEGATIVE
RIGHT EYE DM RETINOPATHY: NEGATIVE

## 2025-01-31 DIAGNOSIS — M25.531 BILATERAL WRIST PAIN: Primary | ICD-10-CM

## 2025-01-31 DIAGNOSIS — M25.532 BILATERAL WRIST PAIN: Primary | ICD-10-CM

## 2025-02-03 ENCOUNTER — OFFICE VISIT (OUTPATIENT)
Dept: ORTHOPEDICS | Facility: CLINIC | Age: 55
End: 2025-02-03
Payer: MEDICARE

## 2025-02-03 ENCOUNTER — HOSPITAL ENCOUNTER (OUTPATIENT)
Dept: RADIOLOGY | Facility: HOSPITAL | Age: 55
Discharge: HOME | End: 2025-02-03
Attending: ORTHOPAEDIC SURGERY
Payer: MEDICARE

## 2025-02-03 DIAGNOSIS — M25.531 BILATERAL WRIST PAIN: ICD-10-CM

## 2025-02-03 DIAGNOSIS — M25.532 BILATERAL WRIST PAIN: ICD-10-CM

## 2025-02-03 PROCEDURE — 73110 X-RAY EXAM OF WRIST: CPT | Mod: TC,50

## 2025-02-03 PROCEDURE — 99204 OFFICE O/P NEW MOD 45 MIN: CPT | Mod: S$PBB,,, | Performed by: ORTHOPAEDIC SURGERY

## 2025-02-03 PROCEDURE — 1159F MED LIST DOCD IN RCRD: CPT | Mod: CPTII,,, | Performed by: ORTHOPAEDIC SURGERY

## 2025-02-03 PROCEDURE — 73110 X-RAY EXAM OF WRIST: CPT | Mod: 26,50,, | Performed by: ORTHOPAEDIC SURGERY

## 2025-02-03 PROCEDURE — 99999 PR PBB SHADOW E&M-EST. PATIENT-LVL III: CPT | Mod: PBBFAC,,, | Performed by: ORTHOPAEDIC SURGERY

## 2025-02-03 PROCEDURE — 99213 OFFICE O/P EST LOW 20 MIN: CPT | Mod: PBBFAC,25 | Performed by: ORTHOPAEDIC SURGERY

## 2025-02-03 PROCEDURE — 3051F HG A1C>EQUAL 7.0%<8.0%: CPT | Mod: CPTII,,, | Performed by: ORTHOPAEDIC SURGERY

## 2025-02-03 PROCEDURE — 1160F RVW MEDS BY RX/DR IN RCRD: CPT | Mod: CPTII,,, | Performed by: ORTHOPAEDIC SURGERY

## 2025-02-03 NOTE — PROGRESS NOTES
CLINIC NOTE       Chief Complaint   Patient presents with    Left Wrist - Pain    Right Wrist - Pain        Bruno Levy is a 54 y.o. male seen today for evaluation of bilateral wrist pain/finger numbness.  Indicates that he underwent bilateral carpal tunnel surgical releases in 2012.  Surgery was performed in [place] where he was residing at the time.  Reportedly he had relief of symptoms and did well until proximally 1 and half to 2 years ago.  He feels as though he has had recurrent symptoms left greater than right.  He is right-hand dominant.  Has history of chronic lumbar back pain for which she uses Percocet.  He is resume the use of night splints for his wrist.  He has taken a leave occasionally.  He has not had any updated EMG/nerve conduction studies.  Symptoms are worse with repetitive activities and at night.      Past Medical History:   Diagnosis Date    Depression     Diabetes mellitus, type 2     History of methicillin resistant staphylococcus aureus (MRSA)     Hyperlipidemia     Hypertension     Neuropathy     Osteomyelitis      Family History   Problem Relation Name Age of Onset    COPD Mother Rica Levy     Lung cancer Father Ceferino Levy     Bladder Cancer Father Ceferino Levy     Cancer Father Ceferino Levy     Hypertension Brother Mitchell Levy      Current Outpatient Medications on File Prior to Visit   Medication Sig Dispense Refill    amLODIPine (NORVASC) 5 MG tablet Take 2 tablets (10 mg total) by mouth once daily. 180 tablet 1    atorvastatin (LIPITOR) 40 MG tablet Take 1 tablet (40 mg total) by mouth once daily. 90 tablet 1    baclofen (LIORESAL) 20 MG tablet Take 20 mg by mouth every 6 (six) hours as needed.      blood sugar diagnostic Strp 1 strip by Misc.(Non-Drug; Combo Route) route once daily. 100 each 1    blood-glucose meter (TRUE METRIX AIR GLUCOSE METER) [facility] 1 each by Other route 4 (four) times daily before meals and nightly. 1 each 0    fluticasone propionate (FLONASE) 50  mcg/actuation nasal spray 1 spray (50 mcg total) by Each Nostril route once daily. 16 g 0    gabapentin (NEURONTIN) 600 MG tablet 1.5 tablets every 6 hours      glimepiride (AMARYL) 2 MG tablet Take 1 tablet (2 mg total) by mouth before breakfast. 90 tablet 0    lancets (TRUEPLUS LANCETS) 33 gauge Misc 1 lancet  by Other route before meals, at bedtime and at 0200. 100 each 0    meclizine (ANTIVERT) 25 mg tablet Take 1 tablet (25 mg total) by mouth 3 (three) times daily as needed for Dizziness. 90 tablet 0    metFORMIN (GLUCOPHAGE-XR) 500 MG ER 24hr tablet TAKE 2 TABLETS TWICE DAILY WITH MEALS 360 tablet 1    oxyCODONE-acetaminophen (PERCOCET) 7.5-325 mg per tablet Take 1 tablet by mouth every 8 (eight) hours as needed.      traZODone (DESYREL) 100 MG tablet Take 1 tablet (100 mg total) by mouth every evening. 90 tablet 0     No current facility-administered medications on file prior to visit.       ROS     There were no vitals filed for this visit.    Past Surgical History:   Procedure Laterality Date    FRACTURE SURGERY  1976    arm    LUMBAR FUSION      NASAL SEPTUM SURGERY      SPINAL CORD STIMULATOR IMPLANT      SPINAL CORD STIMULATOR REMOVAL      TOE AMPUTATION      rt foot great to and 5th toe;lt foot great,4th and 5th toes.        Review of patient's allergies indicates:   Allergen Reactions    Levofloxacin (bulk) Other (See Comments)     Worsened muscle spasms        Ortho Exam:  Well-developed well-nourished  male no acute distress.  Alert oriented cooperative.  Neck is supple without JVD.  Breathing is regular nonlabored.  Skin is warm dry no lesions seen.  Left upper extremity is normal contour.  He has full motion left shoulder elbow forearm and wrist.  Negative Adson's and hyperabduction test.  No thenar or intrinsic wasting wasting.  Tinel's test produces local pain.  Pain is reduced on the left side with Phalen's test at 20 seconds.  He has good capillary refill to all fingertips and  thumb    Radiographic Examination:  Bilateral wrist 02/03/2025    Technique:  6 views AP lateral and oblique both wrists    Findings:  Bones well mineralized.  Carpus normally aligned.  There is mild 1st CMC joint DJD.  No evidence of fracture, dislocation or pathologic bone.    Impression:   See Above    Assessment and Plan  Patient Active Problem List    Diagnosis Date Noted    Seasonal allergies 01/14/2025    Bilateral wrist pain 01/14/2025    Acute maxillary sinusitis 01/06/2025    Encounter for subsequent annual wellness visit (AWV) in Medicare patient 11/14/2024    BMI 29.0-29.9,adult 11/14/2024    Abnormality of gait and mobility 11/14/2024    Encounter for screening for malignant neoplasm of prostate 11/14/2024    Primary insomnia 04/22/2024    Disorder of skin and subcutaneous tissue 04/22/2024    Peripheral vascular disease, unspecified 04/22/2024    Acquired absence of other left toe(s) 04/22/2024    Vertigo 02/02/2024    Positive depression screening 02/02/2024    Encounter for hepatitis C screening test for low risk patient 02/02/2024    Rash 11/24/2023    Puncture wound of finger of right hand 09/06/2023    Puncture wound of right hand without foreign body 08/16/2023    Folliculitis 05/19/2023    Fever 05/19/2023    Non-pressure chronic ulcer of left heel and midfoot with bone involvement without evidence of necrosis 03/17/2023    Vitamin B12 deficiency neuropathy 03/17/2023    Depression, recurrent     Neuropathy     COVID 12/19/2022    Hyperlipidemia     Acute otitis externa of right ear 10/20/2021    Essential hypertension 08/08/2021    Type 2 diabetes mellitus with neurologic complication, without long-term current use of insulin 08/02/2021    Lumbar disc disease 08/02/2021    Impression:  Possible recurrent bilateral carpal tunnel syndrome left greater than right   Plan:  Schedule EMG/nerve conduction studies both upper extremities.  Rx Mobic 15 mg 1 p.o. q.d. p.r.n..  Discontinue a leave in all  other anti-inflammatory medications      Rock Rehman M.D.

## 2025-02-24 ENCOUNTER — PATIENT MESSAGE (OUTPATIENT)
Dept: ORTHOPEDICS | Facility: CLINIC | Age: 55
End: 2025-02-24
Payer: MEDICARE

## 2025-02-27 ENCOUNTER — OFFICE VISIT (OUTPATIENT)
Dept: OTOLARYNGOLOGY | Facility: CLINIC | Age: 55
End: 2025-02-27
Payer: MEDICARE

## 2025-02-27 ENCOUNTER — CLINICAL SUPPORT (OUTPATIENT)
Dept: AUDIOLOGY | Facility: CLINIC | Age: 55
End: 2025-02-27
Payer: MEDICARE

## 2025-02-27 VITALS — HEIGHT: 70 IN | BODY MASS INDEX: 29.49 KG/M2 | WEIGHT: 206 LBS

## 2025-02-27 DIAGNOSIS — H90.3 SENSORINEURAL HEARING LOSS (SNHL) OF BOTH EARS: Primary | ICD-10-CM

## 2025-02-27 DIAGNOSIS — H93.19 TINNITUS, UNSPECIFIED LATERALITY: ICD-10-CM

## 2025-02-27 DIAGNOSIS — H90.3 SENSORY HEARING LOSS, BILATERAL: Primary | ICD-10-CM

## 2025-02-27 DIAGNOSIS — H90.3 SENSORINEURAL HEARING LOSS, BILATERAL: Primary | ICD-10-CM

## 2025-02-27 PROCEDURE — 99999 PR PBB SHADOW E&M-EST. PATIENT-LVL II: CPT | Mod: PBBFAC,,,

## 2025-02-27 PROCEDURE — 99212 OFFICE O/P EST SF 10 MIN: CPT | Mod: PBBFAC,25

## 2025-02-27 PROCEDURE — 1159F MED LIST DOCD IN RCRD: CPT | Mod: CPTII,,, | Performed by: OTOLARYNGOLOGY

## 2025-02-27 PROCEDURE — 3008F BODY MASS INDEX DOCD: CPT | Mod: CPTII,,, | Performed by: OTOLARYNGOLOGY

## 2025-02-27 PROCEDURE — 1160F RVW MEDS BY RX/DR IN RCRD: CPT | Mod: CPTII,,, | Performed by: OTOLARYNGOLOGY

## 2025-02-27 PROCEDURE — 99999 PR PBB SHADOW E&M-EST. PATIENT-LVL III: CPT | Mod: PBBFAC,,, | Performed by: OTOLARYNGOLOGY

## 2025-02-27 PROCEDURE — 99213 OFFICE O/P EST LOW 20 MIN: CPT | Mod: PBBFAC | Performed by: OTOLARYNGOLOGY

## 2025-02-27 PROCEDURE — 99999 PR PBB SHADOW E&M-EST. PATIENT-LVL II: CPT | Mod: PBBFAC,,, | Performed by: AUDIOLOGIST

## 2025-02-27 PROCEDURE — 3051F HG A1C>EQUAL 7.0%<8.0%: CPT | Mod: CPTII,,, | Performed by: OTOLARYNGOLOGY

## 2025-02-27 PROCEDURE — 99499 UNLISTED E&M SERVICE: CPT | Mod: S$PBB,,, | Performed by: OTOLARYNGOLOGY

## 2025-02-27 PROCEDURE — 99214 OFFICE O/P EST MOD 30 MIN: CPT | Mod: S$PBB,,, | Performed by: OTOLARYNGOLOGY

## 2025-02-27 PROCEDURE — 99212 OFFICE O/P EST SF 10 MIN: CPT | Mod: PBBFAC,25 | Performed by: AUDIOLOGIST

## 2025-02-27 PROCEDURE — 92557 COMPREHENSIVE HEARING TEST: CPT | Mod: PBBFAC | Performed by: AUDIOLOGIST

## 2025-02-27 NOTE — PROGRESS NOTES
Subjective:       Patient ID: Bruno Levy is a 54 y.o. male.    Chief Complaint: Hearing Loss (Bilateral ears. Annual hearing test done. )    Hearing Loss:    Associated symptoms: Tinnitus.      Review of Systems   HENT:  Positive for hearing loss and tinnitus.    All other systems reviewed and are negative.      Objective:      Physical Exam  General: NAD  Head: Normocephalic, atraumatic, no facial asymmetry/normal strength,  Ears: Both auricules normal in appearance, w/o deformities tympanic membranes normal external auditory canals normal  Nose: External nose w/o deformities normal turbinates no drainage or inflammation  Oral Cavity: Lips, gums, floor of mouth, tongue hard palate, and buccal mucosa without mass/lesion  Oropharynx: Mucosa pink and moist, soft palate, posterior pharynx and oropharyngeal wall without mass/lesion  Neck: Supple, symmetric, trachea midline, no palpable mass/lesion, no palpable cervical lymphadenopathy  Skin: Warm and dry, no concerning lesions  Respiratory: Respirations even, unlabored  Assessment:       1. Sensorineural hearing loss (SNHL) of both ears    2. Tinnitus, unspecified laterality        Plan:       Audio reviewed and interpreted HF SNHL  Rec Hearing aids

## 2025-02-27 NOTE — PATIENT INSTRUCTIONS
unoccluded EACs; WNL sloping to severe rising to moderately-severe sloping to severe rising to moderately-severe SNHL AD; WNL sloping to severe SNHL AS; PUSH BUTTON RESPONSE; RECOMMEND ANNUAL AUDIO. EVAL. (SOONER IF NEEDED) & HAs

## 2025-04-09 ENCOUNTER — PATIENT MESSAGE (OUTPATIENT)
Dept: ORTHOPEDICS | Facility: CLINIC | Age: 55
End: 2025-04-09
Payer: MEDICARE

## 2025-04-14 ENCOUNTER — OFFICE VISIT (OUTPATIENT)
Dept: FAMILY MEDICINE | Facility: CLINIC | Age: 55
End: 2025-04-14
Payer: MEDICARE

## 2025-04-14 ENCOUNTER — PATIENT OUTREACH (OUTPATIENT)
Facility: HOSPITAL | Age: 55
End: 2025-04-14
Payer: MEDICARE

## 2025-04-14 VITALS
RESPIRATION RATE: 17 BRPM | HEART RATE: 86 BPM | WEIGHT: 208 LBS | OXYGEN SATURATION: 98 % | BODY MASS INDEX: 29.78 KG/M2 | TEMPERATURE: 98 F | DIASTOLIC BLOOD PRESSURE: 80 MMHG | HEIGHT: 70 IN | SYSTOLIC BLOOD PRESSURE: 116 MMHG

## 2025-04-14 DIAGNOSIS — I10 ESSENTIAL HYPERTENSION: ICD-10-CM

## 2025-04-14 DIAGNOSIS — R42 VERTIGO: ICD-10-CM

## 2025-04-14 DIAGNOSIS — Z12.5 ENCOUNTER FOR SCREENING FOR MALIGNANT NEOPLASM OF PROSTATE: ICD-10-CM

## 2025-04-14 DIAGNOSIS — F51.01 PRIMARY INSOMNIA: ICD-10-CM

## 2025-04-14 DIAGNOSIS — E11.43 TYPE 2 DIABETES MELLITUS WITH DIABETIC AUTONOMIC NEUROPATHY, WITHOUT LONG-TERM CURRENT USE OF INSULIN: Primary | ICD-10-CM

## 2025-04-14 DIAGNOSIS — E78.2 MIXED HYPERLIPIDEMIA: ICD-10-CM

## 2025-04-14 LAB
CHOLEST SERPL-MCNC: 114 MG/DL
CHOLEST/HDLC SERPL: 2.9 {RATIO}
CREAT UR-MCNC: 207 MG/DL (ref 23–375)
EST. AVERAGE GLUCOSE BLD GHB EST-MCNC: 143 MG/DL
HBA1C MFR BLD HPLC: 6.6 %
HDLC SERPL-MCNC: 39 MG/DL (ref 35–60)
LDLC SERPL CALC-MCNC: 59 MG/DL
LDLC/HDLC SERPL: 1.5 {RATIO}
MICROALBUMIN UR-MCNC: 3.8 MG/DL
MICROALBUMIN/CREAT RATIO PNL UR: 18.4 MG/G (ref 0–30)
NONHDLC SERPL-MCNC: 75 MG/DL
PSA SERPL-MCNC: 0.17 NG/ML
TRIGL SERPL-MCNC: 81 MG/DL (ref 34–140)
VLDLC SERPL-MCNC: 16 MG/DL

## 2025-04-14 PROCEDURE — 3074F SYST BP LT 130 MM HG: CPT | Mod: ,,,

## 2025-04-14 PROCEDURE — 82570 ASSAY OF URINE CREATININE: CPT | Mod: ,,, | Performed by: CLINICAL MEDICAL LABORATORY

## 2025-04-14 PROCEDURE — 80061 LIPID PANEL: CPT | Mod: ,,, | Performed by: CLINICAL MEDICAL LABORATORY

## 2025-04-14 PROCEDURE — 3008F BODY MASS INDEX DOCD: CPT | Mod: ,,,

## 2025-04-14 PROCEDURE — 83036 HEMOGLOBIN GLYCOSYLATED A1C: CPT | Mod: ,,, | Performed by: CLINICAL MEDICAL LABORATORY

## 2025-04-14 PROCEDURE — 1159F MED LIST DOCD IN RCRD: CPT | Mod: ,,,

## 2025-04-14 PROCEDURE — 82043 UR ALBUMIN QUANTITATIVE: CPT | Mod: ,,, | Performed by: CLINICAL MEDICAL LABORATORY

## 2025-04-14 PROCEDURE — 3079F DIAST BP 80-89 MM HG: CPT | Mod: ,,,

## 2025-04-14 PROCEDURE — G0103 PSA SCREENING: HCPCS | Mod: ,,, | Performed by: CLINICAL MEDICAL LABORATORY

## 2025-04-14 PROCEDURE — 3051F HG A1C>EQUAL 7.0%<8.0%: CPT | Mod: ,,,

## 2025-04-14 PROCEDURE — 1160F RVW MEDS BY RX/DR IN RCRD: CPT | Mod: ,,,

## 2025-04-14 PROCEDURE — 99214 OFFICE O/P EST MOD 30 MIN: CPT | Mod: ,,,

## 2025-04-14 RX ORDER — GLIMEPIRIDE 2 MG/1
2 TABLET ORAL
Qty: 90 TABLET | Refills: 0 | Status: SHIPPED | OUTPATIENT
Start: 2025-04-14

## 2025-04-14 RX ORDER — METFORMIN HYDROCHLORIDE 500 MG/1
TABLET, EXTENDED RELEASE ORAL
Qty: 360 TABLET | Refills: 1 | Status: SHIPPED | OUTPATIENT
Start: 2025-04-14

## 2025-04-14 RX ORDER — AMLODIPINE BESYLATE 5 MG/1
10 TABLET ORAL DAILY
Qty: 180 TABLET | Refills: 1 | Status: SHIPPED | OUTPATIENT
Start: 2025-04-14

## 2025-04-14 RX ORDER — TRAZODONE HYDROCHLORIDE 100 MG/1
100 TABLET ORAL NIGHTLY
Qty: 90 TABLET | Refills: 0 | Status: SHIPPED | OUTPATIENT
Start: 2025-04-14

## 2025-04-14 RX ORDER — ATORVASTATIN CALCIUM 40 MG/1
40 TABLET, FILM COATED ORAL DAILY
Qty: 90 TABLET | Refills: 1 | Status: SHIPPED | OUTPATIENT
Start: 2025-04-14

## 2025-04-14 RX ORDER — MECLIZINE HYDROCHLORIDE 25 MG/1
25 TABLET ORAL 3 TIMES DAILY PRN
Qty: 90 TABLET | Refills: 0 | Status: SHIPPED | OUTPATIENT
Start: 2025-04-14

## 2025-04-14 NOTE — LETTER
AUTHORIZATION FOR RELEASE OF   CONFIDENTIAL INFORMATION    Dear [facility],    We are seeing Bruno Levy, date of birth 1970, in the clinic at [facility]. Franklyn Acosta FNP is the patient's PCP. Bruno Levy has an outstanding lab/procedure at the time we reviewed his chart. In order to help keep his health information updated, he has authorized us to request the following medical record(s):        (  )  MAMMOGRAM                                      (  )  COLONOSCOPY      (  )  PAP SMEAR                                          (  )  OUTSIDE LAB RESULTS     (  )  DEXA SCAN                                          (  )  EYE EXAM            ( X )  FOOT EXAM                                          (  )  ENTIRE RECORD     (  )  OUTSIDE IMMUNIZATIONS                 (  )  _______________         Please fax records to Marcie Michel LPN Care Coordinator at [phone].       If you have any questions, please call [phone].          Patient Name: Bruno Levy  : 1970  Patient Phone #: [phone]            Bruno Levy  MRN: [id number]  : 1970  Age: 54 y.o.  Sex: male         Patient/Legal Guardian Signature  This signature was collected at 2024           _______________________________   Printed Name/Relationship to Patient      Consent for Examination and Treatment: I hereby authorize the providers and employees of [facility] ([facility]) to provide medical treatment/services which includes, but is not limited to, performing and administering tests and diagnostic procedures that are deemed necessary, including, but not limited to, imaging examinations, blood tests and other laboratory procedures as may be required by the hospital, clinic, or may be ordered by my physician(s) or persons working under the general and/or special instructions of my physician(s).      I understand and agree that this consent covers all authorized persons,  including but not limited to physicians, residents, nurse practitioners, physicians' assistants, specialists, consultants, student nurses, and independently contracted physicians, who are called upon by the physician in charge, to carry out the diagnostic procedures and medical or surgical treatment.     I hereby authorize [facility] to retain or dispose of any specimens or tissue, should there be such remaining from any test or procedure.     I hereby authorize and give consent for [facility] providers and employees to take photographs, images or videotapes of such diagnostic, surgical or treatment procedures of Patient as may be required by [facility] or as may be ordered by a physician. I further acknowledge and agree that [facility] may use cameras or other devices for patient monitoring.     I am aware that the practice of medicine is not an exact science, and I acknowledge that no guarantees have been made to me as to the outcome of any tests, procedures or treatment.     Authorization for Release of Information: I understand that my insurance company and/or their agents may need information necessary to make determinations about payment/reimbursement. I hereby provide authorization to release to all insurance companies, their successors, assignees, other parties with whom they may have contracted, or others acting on their behalf, that are involved with payment for any hospital and/or clinic charges incurred by the patient, any information that they request and deem necessary for payment/reimbursement, and/or quality review.  I further authorize the release of my health information to physicians or other health care practitioners on staff who are involved in my health care now and in the future, and to other health care providers, entities, or institutions for the purpose of my continued care and treatment, including referrals.     REGISTRATION AUTHORIZATION  Form No. 66909 (Rev. 3/25/2024)    Page 1 of 3                        Medicare Patient's Certification and Authorization to Release Information and Payment Request:  I certify that the information given by me in applying for payment under Title XVIII of the Social Security Act is correct. I authorize any mcmullen of medical or other information about me to release to the Social SecurityAdministration, or its intermediaries or carriers, any information needed for this or a related Medicare claim. I request that payment of authorized benefits be made on my behalf.     Assignment of Insurance Benefits:   I hereby authorize any and all insurance companies, health plans, defined   benefit plans, health insurers or any entity that is or may be responsible for payment of my medical expenses to pay all hospital and medical benefits now due, and to become due and payable to me under any hospital benefits, sick benefits, injury benefits or any other benefit for services rendered to me, including Major Medical Benefits, direct to [facility] and all independently contracted physicians. I assign any and all rights that I may have against any and all insurance companies, health plans, defined benefit plans, health insurers or any entity that is or may be responsible for payment of my medical expenses, including, but not limited to any right to appeal a denial of a claim, any right to bring any action, lawsuit, administrative proceeding, or other cause of action on my behalf. I specifically assign my right to pursue litigation against any and all insurance companies, health plans, defined benefit plans, health insurers or any entity that is or may be responsible for payment of my medical expenses based upon a refusal to pay charges.            E. Valuables: It is understood and agreed that [facility] is not liable for the damage to or loss of any money, jewelry,   documents, dentures, eye glasses, hearing aids, prosthetics, or other property of value.     F. Computer Equipment: I understand and  agree that should I choose to use computer equipment owned by [facility] or if I choose to access the Internet via [facility]s network, I do so at my own risk. [facility] is not responsible for any damage to my computer equipment or to any damages of any type that might arise from my loss of equipment or data.     G. Acceptance of Financial Responsibility:  I agree that in consideration of the services and   supplies that have been   or will be furnished to the patient, I am hereby obligated to pay all charges made for or on the account of the patient according to the standard rates (in effect at the time the services and supplies are delivered) established by [facility], including its Patient Financial Assistance Policy to the extent it is applicable. I understand that I am responsible for all charges, or portions thereof, not covered by insurance or other sources. Patient refunds will be distributed only after balances at all [facility] facilities are paid.     H. Communication Authorization:  I hereby authorize [facility] and its representatives, along with any billing service   or  who may work on their behalf, to contact me on   my cell phone and/or home phone using pre- recorded messages, artificial voice messages, automatic telephone dialing devices or other computer assisted technology, or by electronic      mail, text messaging, or by any other form of electronic communication. This includes, but is not limited to, appointment reminders, yearly physical exam reminders, preventive care reminders, patient campaigns, welcome calls, and calls about account balances on my account or any account on which I am listed as a guarantor. I understand I have the right to opt out of these communications at any time.      Relationship  Between  Facility and  Provider:      I understand that some, but not all, providers furnishing services to the patient are not employees or agents of [facility]. The patient is under the care  and supervision of his/her attending physician, and it is the responsibility of the facility and its nursing staff to carry out the instructions of such physicians. It is the responsibility of the patient's physician/designee to obtain the patient's informed consent, when required, for medical or surgical treatment, special diagnostic or therapeutic procedures, or hospital services rendered for the patient under the special instructions of the physician/designee.           REGISTRATION AUTHORIZATION  Form No. 94871 (Rev. 3/25/2024)    Page 2 of 3                       Immunizations: [facility] shares immunization information with state sponsored health departments to help you and your doctor keep track of your immunization records. By signing, you consent to have this information shared with the health department in your state:                                [place] - LINKS ([place] Immunization Network for Kids Statewide)                                [place] - MIIX ([place] Immunization Information eXchange)                                [place] - ImmPRINT (Immunization Patient Registry with Integrated Technology)     TERM: This authorization is valid for this and subsequent care/treatment I receive at [facility] and will remain valid unless/until revoked in writing by me.     [facility]: As used in this document, [facility] means all [facility] owned and managed facilities, including, but not limited to, all health centers, surgery centers, clinics, urgent care centers, and hospitals.         [facility] complies with applicable Federal civil rights laws and does not discriminate on the basis of race, color, national origin, age, disability, or sex.  ATENCIÓN: si habla español, tiene a burton disposición servicios gratuitos de asistencia lingüística. Di moreno [phone].  CHÚ Ý: N?u b?n nói Ti?ng Vi?t, có các d?ch v? h? tr? ngôn ng? mi?n phí dành cho b?n. G?i s? [phone].         REGISTRATION AUTHORIZATION  Form No. 59130 (Rev. 3/25/2024)   Page 3 of 3

## 2025-04-14 NOTE — ASSESSMENT & PLAN NOTE
"Lipid panel obtained at today's visit. Goal LDL is less than 70. Continue current meds and low fat/low cholesterol diet with increased exercise as tolerated. Will follow up with labs. Patient to follow up in 3 months or as needed    A few changes in your diet can reduce cholesterol and improve your heart health. Saturated fats, found primarily in red meat and full-fat dairy products, raise your total cholesterol. Decreasing your consumption of saturated fats can reduce your low-density lipoprotein (LDL) cholesterol. rans fats, sometimes listed on food labels as "partially hydrogenated vegetable oil," are often used in margarines and store-bought cookies, crackers and cakes.     Trans fats raise overall cholesterol levels. Omega-3 fatty acids don't affect LDL cholesterol. But they have other heart-healthy benefits, including reducing blood pressure. Foods with omega-3 fatty acids include salmon, mackerel, herring, walnuts and flaxseeds.Soluble fiber can reduce the absorption of cholesterol into your bloodstream. Soluble fiber is found in such foods as oatmeal, kidney beans, [place] sprouts, apples and pears.  at least 30 minutes of exercise five times a week or vigorous aerobic activity for 20 minutes three times a week if tolerated.    "

## 2025-04-14 NOTE — PROGRESS NOTES
Population Health Chart Review & Patient Outreach Details    Updates Requested / Reviewed:  [x]  Care Team Updated      Health Maintenance Topics Addressed and Outreach Outcomes / Actions Taken:  Diabetic Eye Exam  Foot Exam [x] Aric sent to [facility]     Aric sent to [facility]

## 2025-04-14 NOTE — LETTER
AUTHORIZATION FOR RELEASE OF   CONFIDENTIAL INFORMATION    Dear [facility],    We are seeing Bruno Levy, date of birth 1970, in the clinic at [facility]. Franklyn Acosta FNP is the patient's PCP. Bruno Levy has an outstanding lab/procedure at the time we reviewed his chart. In order to help keep his health information updated, he has authorized us to request the following medical record(s):        (  )  MAMMOGRAM                                      (  )  COLONOSCOPY      (  )  PAP SMEAR                                          (  )  OUTSIDE LAB RESULTS     (  )  DEXA SCAN                                          ( X )  EYE EXAM            (  )  FOOT EXAM                                          (  )  ENTIRE RECORD     (  )  OUTSIDE IMMUNIZATIONS                 (  )  _______________         Please fax records to Marcie Michel LPN Care Coordinator at [phone].       If you have any questions, please call [phone].          Patient Name: Bruno Levy  : 1970  Patient Phone #: [phone]            Bruno Levy  MRN: [id number]  : 1970  Age: 54 y.o.  Sex: male         Patient/Legal Guardian Signature  This signature was collected at 2024           _______________________________   Printed Name/Relationship to Patient      Consent for Examination and Treatment: I hereby authorize the providers and employees of [facility] ([facility]) to provide medical treatment/services which includes, but is not limited to, performing and administering tests and diagnostic procedures that are deemed necessary, including, but not limited to, imaging examinations, blood tests and other laboratory procedures as may be required by the hospital, clinic, or may be ordered by my physician(s) or persons working under the general and/or special instructions of my physician(s).      I understand and agree that this consent covers all authorized persons, including but  not limited to physicians, residents, nurse practitioners, physicians' assistants, specialists, consultants, student nurses, and independently contracted physicians, who are called upon by the physician in charge, to carry out the diagnostic procedures and medical or surgical treatment.     I hereby authorize [facility] to retain or dispose of any specimens or tissue, should there be such remaining from any test or procedure.     I hereby authorize and give consent for [facility] providers and employees to take photographs, images or videotapes of such diagnostic, surgical or treatment procedures of Patient as may be required by [facility] or as may be ordered by a physician. I further acknowledge and agree that [facility] may use cameras or other devices for patient monitoring.     I am aware that the practice of medicine is not an exact science, and I acknowledge that no guarantees have been made to me as to the outcome of any tests, procedures or treatment.     Authorization for Release of Information: I understand that my insurance company and/or their agents may need information necessary to make determinations about payment/reimbursement. I hereby provide authorization to release to all insurance companies, their successors, assignees, other parties with whom they may have contracted, or others acting on their behalf, that are involved with payment for any hospital and/or clinic charges incurred by the patient, any information that they request and deem necessary for payment/reimbursement, and/or quality review.  I further authorize the release of my health information to physicians or other health care practitioners on staff who are involved in my health care now and in the future, and to other health care providers, entities, or institutions for the purpose of my continued care and treatment, including referrals.     REGISTRATION AUTHORIZATION  Form No. 02050 (Rev. 3/25/2024)    Page 1 of 3                        Medicare Patient's Certification and Authorization to Release Information and Payment Request:  I certify that the information given by me in applying for payment under Title XVIII of the Social Security Act is correct. I authorize any mcmullen of medical or other information about me to release to the Social SecurityAdministration, or its intermediaries or carriers, any information needed for this or a related Medicare claim. I request that payment of authorized benefits be made on my behalf.     Assignment of Insurance Benefits:   I hereby authorize any and all insurance companies, health plans, defined   benefit plans, health insurers or any entity that is or may be responsible for payment of my medical expenses to pay all hospital and medical benefits now due, and to become due and payable to me under any hospital benefits, sick benefits, injury benefits or any other benefit for services rendered to me, including Major Medical Benefits, direct to [facility] and all independently contracted physicians. I assign any and all rights that I may have against any and all insurance companies, health plans, defined benefit plans, health insurers or any entity that is or may be responsible for payment of my medical expenses, including, but not limited to any right to appeal a denial of a claim, any right to bring any action, lawsuit, administrative proceeding, or other cause of action on my behalf. I specifically assign my right to pursue litigation against any and all insurance companies, health plans, defined benefit plans, health insurers or any entity that is or may be responsible for payment of my medical expenses based upon a refusal to pay charges.            E. Valuables: It is understood and agreed that [facility] is not liable for the damage to or loss of any money, jewelry,   documents, dentures, eye glasses, hearing aids, prosthetics, or other property of value.     F. Computer Equipment: I understand and agree that  should I choose to use computer equipment owned by [facility] or if I choose to access the Internet via [facility]s network, I do so at my own risk. [facility] is not responsible for any damage to my computer equipment or to any damages of any type that might arise from my loss of equipment or data.     G. Acceptance of Financial Responsibility:  I agree that in consideration of the services and   supplies that have been   or will be furnished to the patient, I am hereby obligated to pay all charges made for or on the account of the patient according to the standard rates (in effect at the time the services and supplies are delivered) established by [facility], including its Patient Financial Assistance Policy to the extent it is applicable. I understand that I am responsible for all charges, or portions thereof, not covered by insurance or other sources. Patient refunds will be distributed only after balances at all [facility] facilities are paid.     H. Communication Authorization:  I hereby authorize [facility] and its representatives, along with any billing service   or  who may work on their behalf, to contact me on   my cell phone and/or home phone using pre- recorded messages, artificial voice messages, automatic telephone dialing devices or other computer assisted technology, or by electronic      mail, text messaging, or by any other form of electronic communication. This includes, but is not limited to, appointment reminders, yearly physical exam reminders, preventive care reminders, patient campaigns, welcome calls, and calls about account balances on my account or any account on which I am listed as a guarantor. I understand I have the right to opt out of these communications at any time.      Relationship  Between  Facility and  Provider:      I understand that some, but not all, providers furnishing services to the patient are not employees or agents of [facility]. The patient is under the care and  supervision of his/her attending physician, and it is the responsibility of the facility and its nursing staff to carry out the instructions of such physicians. It is the responsibility of the patient's physician/designee to obtain the patient's informed consent, when required, for medical or surgical treatment, special diagnostic or therapeutic procedures, or hospital services rendered for the patient under the special instructions of the physician/designee.           REGISTRATION AUTHORIZATION  Form No. 45087 (Rev. 3/25/2024)    Page 2 of 3                       Immunizations: [facility] shares immunization information with state sponsored health departments to help you and your doctor keep track of your immunization records. By signing, you consent to have this information shared with the health department in your state:                                [place] - LINKS ([place] Immunization Network for Kids Statewide)                                [place] - MIIX ([place] Immunization Information eXchange)                                [place] - ImmPRINT (Immunization Patient Registry with Integrated Technology)     TERM: This authorization is valid for this and subsequent care/treatment I receive at [facility] and will remain valid unless/until revoked in writing by me.     [facility]: As used in this document, [facility] means all [facility] owned and managed facilities, including, but not limited to, all health centers, surgery centers, clinics, urgent care centers, and hospitals.         [facility] complies with applicable Federal civil rights laws and does not discriminate on the basis of race, color, national origin, age, disability, or sex.  ATENCIÓN: si habla español, tiene a burton disposición servicios gratuitos de asistencia lingüística. Di moreno [phone].  CHÚ Ý: N?u b?n nói Ti?ng Vi?t, có các d?ch v? h? tr? ngôn ng? mi?n phí dành cho b?n. G?i s? [phone].         REGISTRATION AUTHORIZATION  Form No. 05516 (Rev. 3/25/2024)   Page 3 of 3

## 2025-04-14 NOTE — PROGRESS NOTES
Due to increased dizziness the Pt has been experiencing, orthostatic vital signs obtained and documented. BP and Pulse taken after the Pt was lying down for 5 minutes, then after sitting, and after standing. Pt reported dizziness when moving from a sitting to a standing position.

## 2025-04-14 NOTE — PROGRESS NOTES
BEATRICE PARKS, HAYLEY   [facility]  [address] [address]  [place]  [address]        PATIENT NAME: Bruno Levy  : 1970  DATE: 25  MRN: [id number]        Reason for Visit / Chief Complaint: Hypertension (Pt is a 53 yo male who presents to the clinic for a 3 month follow up on HTN.), Diabetes (3 month follow up on T2DM. ), Hyperlipidemia (3 month follow up on Hyperlipidemia.), Health Maintenance (Care Gaps Discussed: /Foot Exam - Appt with Dr. Radhames Sin today/Diabetic Eye Exam - Done in 2025 [facility]/Patient declined HIV Screening, LDCT Lung Screening, Colorectal Cancer Screening, Pneumococcal Vaccine, Shingles Vaccine, COVID-19 Vaccine. Pt notified the Prevnar, Shingles, and COVID vaccines are available at his pharmacy. //), and Dizziness (Pt c/o increased dizziness when changing positions. )       Update PCP  Update Chief Complaint         History of Present Illness / Problem Focused Workflow     Bruno Levy presents to the clinic with Hypertension (Pt is a 53 yo male who presents to the clinic for a 3 month follow up on HTN.), Diabetes (3 month follow up on T2DM. ), Hyperlipidemia (3 month follow up on Hyperlipidemia.), Health Maintenance (Care Gaps Discussed: /Foot Exam - Appt with Dr. Radhames Sin today/Diabetic Eye Exam - Done in 2025 [facility]/Patient declined HIV Screening, LDCT Lung Screening, Colorectal Cancer Screening, Pneumococcal Vaccine, Shingles Vaccine, COVID-19 Vaccine. Pt notified the Prevnar, Shingles, and COVID vaccines are available at his pharmacy. //), and Dizziness (Pt c/o increased dizziness when changing positions. )         Review of Systems     Review of Systems   Constitutional:  Negative for chills, fatigue and fever.   HENT:  Negative for nasal congestion, ear discharge, ear pain, rhinorrhea, sinus pressure/congestion and sore throat.    Respiratory:  Negative for cough, chest tightness, shortness of breath, wheezing and stridor.     Cardiovascular:  Negative for palpitations and claudication.   Gastrointestinal:  Negative for abdominal pain, constipation, diarrhea, nausea, vomiting and reflux.   Genitourinary:  Negative for dysuria, flank pain, frequency, hematuria and urgency.   Musculoskeletal:  Negative for myalgias.   Neurological:  Negative for dizziness, weakness, light-headedness and headaches.   Psychiatric/Behavioral:  Negative for suicidal ideas.         Medical / Social / Family History     Past Medical History:   Diagnosis Date    Depression     Diabetes mellitus, type 2     History of methicillin resistant staphylococcus aureus (MRSA)     Hyperlipidemia     Hypertension     Neuropathy     Osteomyelitis        Past Surgical History:   Procedure Laterality Date    FRACTURE SURGERY  1976    arm    LUMBAR FUSION      NASAL SEPTUM SURGERY      SPINAL CORD STIMULATOR IMPLANT      SPINAL CORD STIMULATOR REMOVAL      TOE AMPUTATION      rt foot great to and 5th toe;lt foot great,4th and 5th toes.       Social History    reports that he quit smoking about 3 years ago. His smoking use included vaping with nicotine and cigarettes. He started smoking about 38 years ago. He has a 35 pack-year smoking history. He has been exposed to tobacco smoke. He has never used smokeless tobacco. He reports that he does not currently use alcohol. He reports current drug use. Drug: Oxycodone.    Family History  's family history includes Bladder Cancer in his father; COPD in his mother; Cancer in his father; Hypertension in his brother; Lung cancer in his father.    Medications and Allergies     Medications  Outpatient Medications Marked as Taking for the 4/14/25 encounter (Office Visit) with Franklyn Acosta FNP   Medication Sig Dispense Refill    baclofen (LIORESAL) 20 MG tablet Take 20 mg by mouth every 6 (six) hours as needed.      blood sugar diagnostic Strp 1 strip by Misc.(Non-Drug; Combo Route) route once daily. 100 each 1    blood-glucose  "meter (TRUE METRIX AIR GLUCOSE METER) Misc 1 each by Other route 4 (four) times daily before meals and nightly. 1 each 0    fluticasone propionate (FLONASE) 50 mcg/actuation nasal spray 1 spray (50 mcg total) by Each Nostril route once daily. 16 g 0    gabapentin (NEURONTIN) 600 MG tablet 1.5 tablets every 6 hours      lancets (TRUEPLUS LANCETS) 33 gauge Misc 1 lancet  by Other route before meals, at bedtime and at 0200. 100 each 0    oxyCODONE-acetaminophen (PERCOCET) 7.5-325 mg per tablet Take 1 tablet by mouth every 8 (eight) hours as needed.      [DISCONTINUED] amLODIPine (NORVASC) 5 MG tablet Take 2 tablets (10 mg total) by mouth once daily. 180 tablet 1    [DISCONTINUED] atorvastatin (LIPITOR) 40 MG tablet Take 1 tablet (40 mg total) by mouth once daily. 90 tablet 1    [DISCONTINUED] glimepiride (AMARYL) 2 MG tablet Take 1 tablet (2 mg total) by mouth before breakfast. 90 tablet 0    [DISCONTINUED] meclizine (ANTIVERT) 25 mg tablet Take 1 tablet (25 mg total) by mouth 3 (three) times daily as needed for Dizziness. 90 tablet 0    [DISCONTINUED] metFORMIN (GLUCOPHAGE-XR) 500 MG ER 24hr tablet TAKE 2 TABLETS TWICE DAILY WITH MEALS 360 tablet 1    [DISCONTINUED] traZODone (DESYREL) 100 MG tablet Take 1 tablet (100 mg total) by mouth every evening. 90 tablet 0       Allergies  Review of patient's allergies indicates:   Allergen Reactions    Levofloxacin (bulk) Other (See Comments)     Worsened muscle spasms       Physical Examination   /80 (BP Location: Left arm, Patient Position: Standing) Comment (Patient Position): Dizziness experienced when standing.  Pulse 86   Temp 97.9 °F (36.6 °C) (Oral)   Resp 17   Ht 5' 10" (1.778 m)   Wt 94.3 kg (208 lb)   SpO2 98%   BMI 29.84 kg/m²    Physical Exam  Vitals and nursing note reviewed.   Constitutional:       General: He is awake.      Appearance: Normal appearance.   HENT:      Head: Normocephalic.      Right Ear: Tympanic membrane, ear canal and external ear " normal.      Left Ear: Tympanic membrane, ear canal and external ear normal.      Nose: Nose normal.      Mouth/Throat:      Lips: Pink.      Mouth: Mucous membranes are moist.      Pharynx: Oropharynx is clear. Uvula midline.   Cardiovascular:      Rate and Rhythm: Normal rate and regular rhythm.      Heart sounds: Normal heart sounds, S1 normal and S2 normal.   Pulmonary:      Effort: Pulmonary effort is normal. No respiratory distress.      Breath sounds: Normal breath sounds. No decreased breath sounds, wheezing, rhonchi or rales.   Abdominal:      General: Bowel sounds are normal.      Palpations: Abdomen is soft.      Tenderness: There is no abdominal tenderness.   Musculoskeletal:      Cervical back: Normal range of motion.   Skin:     General: Skin is warm.      Capillary Refill: Capillary refill takes less than 2 seconds.   Neurological:      Mental Status: He is alert and oriented to person, place, and time.   Psychiatric:         Thought Content: Thought content does not include homicidal or suicidal ideation. Thought content does not include homicidal or suicidal plan.          Assessment and Plan (including Health Maintenance)      Problem List  Smart Sets  Document Outside HM   :    Plan: Follow up 3 months        Health Maintenance Due   Topic Date Due    HIV Screening  Never done    Colorectal Cancer Screening  Never done    Pneumococcal Vaccines (Age 50+) (2 of 2 - PCV) 08/28/2016    LDCT Lung Screen  06/09/2020    Shingles Vaccine (1 of 2) Never done    Foot Exam  03/17/2024    COVID-19 Vaccine (1 - 2024-25 season) Never done    PROSTATE-SPECIFIC ANTIGEN  10/18/2024    Diabetic Eye Exam  01/22/2025    Diabetes Urine Screening  04/23/2025       Problem List Items Addressed This Visit       Type 2 diabetes mellitus with neurologic complication, without long-term current use of insulin - Primary    Current Assessment & Plan    Goal is less than 7. Continue current meds and low carb/no concentrated  "sweets diet with increased exercise as tolerated. Will follow up with lab results. Patient to follow up in 3 months or as needed.          Relevant Medications    glimepiride (AMARYL) 2 MG tablet    metFORMIN (GLUCOPHAGE-XR) 500 MG ER 24hr tablet    Other Relevant Orders    Hemoglobin A1C    Microalbumin/Creatinine Ratio, Urine    Essential hypertension    Current Assessment & Plan   BP controlled today. Monitor BP daily and keep BP daily log to bring to next visit. Exercise at least 5 times a week. Keep scheduled appts. RTC sooner if BP is staying >140/90. Dash diet.    DASH- includes foods rich in K+, calcium and Magnesium.The diet limits foods high in sodium, saturated fats and added sugars. This is a flexible balanced eating plan that helps create heart healthy eating style for life. Diet is rich in vegetable, whole grains and fruits. It includes fat free or low fat dairy products, fish, poultry, nuts. Chose foods high in K+, calcium, magnesium, fiber, protein, and low in saturated fats and sodium.          Relevant Medications    amLODIPine (NORVASC) 5 MG tablet    Hyperlipidemia    Current Assessment & Plan   Lipid panel obtained at today's visit. Goal LDL is less than 70. Continue current meds and low fat/low cholesterol diet with increased exercise as tolerated. Will follow up with labs. Patient to follow up in 3 months or as needed    A few changes in your diet can reduce cholesterol and improve your heart health. Saturated fats, found primarily in red meat and full-fat dairy products, raise your total cholesterol. Decreasing your consumption of saturated fats can reduce your low-density lipoprotein (LDL) cholesterol. rans fats, sometimes listed on food labels as "partially hydrogenated vegetable oil," are often used in margarines and store-bought cookies, crackers and cakes.     Trans fats raise overall cholesterol levels. Omega-3 fatty acids don't affect LDL cholesterol. But they have other heart-healthy " benefits, including reducing blood pressure. Foods with omega-3 fatty acids include salmon, mackerel, herring, walnuts and flaxseeds.Soluble fiber can reduce the absorption of cholesterol into your bloodstream. Soluble fiber is found in such foods as oatmeal, kidney beans, [place] sprouts, apples and pears.  at least 30 minutes of exercise five times a week or vigorous aerobic activity for 20 minutes three times a week if tolerated.           Relevant Medications    atorvastatin (LIPITOR) 40 MG tablet    Other Relevant Orders    Lipid Panel    Vertigo    Current Assessment & Plan   Symptoms controlled well. Continue current medication regimen         Relevant Medications    meclizine (ANTIVERT) 25 mg tablet    Primary insomnia    Current Assessment & Plan   Symptoms controlled well. Continue current medication regimen.      Reviewed treatment plan and medication side effects/risk/benefits/directions on taking medications. Patient was instructed to take medication on a night that they do not have to get up and drive, go to work, etc. to monitor for morning drowsiness. Patient was instructed to return to clinic for follow-up on medication to determine effectiveness. Patient verbalized understanding of treatment plan and denies any questions.         Relevant Medications    traZODone (DESYREL) 100 MG tablet    Encounter for screening for malignant neoplasm of prostate    Relevant Orders    PSA, Screening       Health Maintenance Topics with due status: Not Due       Topic Last Completion Date    TETANUS VACCINE 08/04/2023    Lipid Panel 01/14/2025    Hemoglobin A1c 01/14/2025    Low Dose Statin 04/14/2025    RSV Vaccine (Age 60+ and Pregnant patients) Not Due       Future Appointments   Date Time Provider Department Center   4/21/2025  2:00 PM Rock Rehman MD [facility]   7/14/2025  8:00 AM Franklyn Acosta FNP [facility] [place]   11/13/2025  1:00 PM AWV NURSE ROYA [facility] [place]  [facility]            Signature:      HAYLEY RIOS   [address]  [place]  [address]       Date of encounter: 4/14/25

## 2025-04-15 ENCOUNTER — PATIENT OUTREACH (OUTPATIENT)
Facility: HOSPITAL | Age: 55
End: 2025-04-15
Payer: MEDICARE

## 2025-04-21 ENCOUNTER — OFFICE VISIT (OUTPATIENT)
Dept: ORTHOPEDICS | Facility: CLINIC | Age: 55
End: 2025-04-21
Payer: MEDICARE

## 2025-04-21 DIAGNOSIS — G56.03 BILATERAL CARPAL TUNNEL SYNDROME: Primary | ICD-10-CM

## 2025-04-21 PROCEDURE — 99214 OFFICE O/P EST MOD 30 MIN: CPT | Mod: S$PBB,,, | Performed by: ORTHOPAEDIC SURGERY

## 2025-04-21 PROCEDURE — 1159F MED LIST DOCD IN RCRD: CPT | Mod: CPTII,,, | Performed by: ORTHOPAEDIC SURGERY

## 2025-04-21 PROCEDURE — 3044F HG A1C LEVEL LT 7.0%: CPT | Mod: CPTII,,, | Performed by: ORTHOPAEDIC SURGERY

## 2025-04-21 PROCEDURE — 99999 PR PBB SHADOW E&M-EST. PATIENT-LVL II: CPT | Mod: PBBFAC,,, | Performed by: ORTHOPAEDIC SURGERY

## 2025-04-21 PROCEDURE — 3066F NEPHROPATHY DOC TX: CPT | Mod: CPTII,,, | Performed by: ORTHOPAEDIC SURGERY

## 2025-04-21 PROCEDURE — 99212 OFFICE O/P EST SF 10 MIN: CPT | Mod: PBBFAC | Performed by: ORTHOPAEDIC SURGERY

## 2025-04-21 PROCEDURE — 1160F RVW MEDS BY RX/DR IN RCRD: CPT | Mod: CPTII,,, | Performed by: ORTHOPAEDIC SURGERY

## 2025-04-21 PROCEDURE — 3061F NEG MICROALBUMINURIA REV: CPT | Mod: CPTII,,, | Performed by: ORTHOPAEDIC SURGERY

## 2025-04-21 NOTE — PROGRESS NOTES
CLINIC NOTE       Chief Complaint   Patient presents with    Left Wrist - Follow-up    Right Wrist - Follow-up        Bruno Levy is a 54 y.o. male seen today for recheck of both wrist.  He underwent EMG/nerve conduction studies both upper extremities performed by Dr. Jayson alonso 03/24/2025.  The nerve studies were consistent with a bilateral carpal tunnel syndrome in both affecting the sensory and motor components.  He is left-hand is more symptomatic than the right.  We have discussed the option for carpal tunnel surgical release.  He had a prior bilateral carpal tunnel surgical release performed in 2012 in [place] initially with relief of symptoms.  We have discussed the option for general versus beer block anesthesia.  The potential benefits and risks of surgery outlined      Past Medical History:   Diagnosis Date    Depression     Diabetes mellitus, type 2     History of methicillin resistant staphylococcus aureus (MRSA)     Hyperlipidemia     Hypertension     Neuropathy     Osteomyelitis      Family History   Problem Relation Name Age of Onset    COPD Mother Rica Levy     Lung cancer Father Ceferino Levy     Bladder Cancer Father Ceferino Levy     Cancer Father Ceferino Levy     Hypertension Brother Mitchell Levy      Medications Ordered Prior to Encounter[1]    ROS     There were no vitals filed for this visit.    Past Surgical History:   Procedure Laterality Date    FRACTURE SURGERY  1976    arm    LUMBAR FUSION      NASAL SEPTUM SURGERY      SPINAL CORD STIMULATOR IMPLANT      SPINAL CORD STIMULATOR REMOVAL      TOE AMPUTATION      rt foot great to and 5th toe;lt foot great,4th and 5th toes.        Review of patient's allergies indicates:   Allergen Reactions    Levofloxacin (bulk) Other (See Comments)     Worsened muscle spasms        Ortho Exam     Radiographic Examination:    Technique:    Findings:    Impression:   See Above    Assessment and Plan  Patient Active Problem List    Diagnosis Date  Noted    Seasonal allergies 01/14/2025    Bilateral wrist pain 01/14/2025    Acute maxillary sinusitis 01/06/2025    Encounter for subsequent annual wellness visit (AWV) in Medicare patient 11/14/2024    BMI 29.0-29.9,adult 11/14/2024    Abnormality of gait and mobility 11/14/2024    Encounter for screening for malignant neoplasm of prostate 11/14/2024    Primary insomnia 04/22/2024    Disorder of skin and subcutaneous tissue 04/22/2024    Peripheral vascular disease, unspecified 04/22/2024    Acquired absence of other left toe(s) 04/22/2024    Vertigo 02/02/2024    Positive depression screening 02/02/2024    Encounter for hepatitis C screening test for low risk patient 02/02/2024    Rash 11/24/2023    Puncture wound of finger of right hand 09/06/2023    Puncture wound of right hand without foreign body 08/16/2023    Folliculitis 05/19/2023    Fever 05/19/2023    Non-pressure chronic ulcer of left heel and midfoot with bone involvement without evidence of necrosis 03/17/2023    Vitamin B12 deficiency neuropathy 03/17/2023    Depression, recurrent     Neuropathy     COVID 12/19/2022    Hyperlipidemia     Acute otitis externa of right ear 10/20/2021    Essential hypertension 08/08/2021    Type 2 diabetes mellitus with neurologic complication, without long-term current use of insulin 08/02/2021    Lumbar disc disease 08/02/2021    Impression: Bilateral carpal tunnel syndrome left greater than right   Plan:  Left carpal tunnel surgical release outpatient general versus beer block anesthesia.      Rock Rehman M.D.                   [1]   Current Outpatient Medications on File Prior to Visit   Medication Sig Dispense Refill    amLODIPine (NORVASC) 5 MG tablet Take 2 tablets (10 mg total) by mouth once daily. 180 tablet 1    atorvastatin (LIPITOR) 40 MG tablet Take 1 tablet (40 mg total) by mouth once daily. 90 tablet 1    baclofen (LIORESAL) 20 MG tablet Take 20 mg by mouth every 6 (six) hours as needed.      blood  sugar diagnostic Strp 1 strip by Misc.(Non-Drug; Combo Route) route once daily. 100 each 1    blood-glucose meter (TRUE METRIX AIR GLUCOSE METER) Mis 1 each by Other route 4 (four) times daily before meals and nightly. 1 each 0    fluticasone propionate (FLONASE) 50 mcg/actuation nasal spray 1 spray (50 mcg total) by Each Nostril route once daily. 16 g 0    gabapentin (NEURONTIN) 600 MG tablet 1.5 tablets every 6 hours      glimepiride (AMARYL) 2 MG tablet Take 1 tablet (2 mg total) by mouth before breakfast. 90 tablet 0    lancets (TRUEPLUS LANCETS) 33 gauge [facility] 1 lancet  by Other route before meals, at bedtime and at 0200. 100 each 0    meclizine (ANTIVERT) 25 mg tablet Take 1 tablet (25 mg total) by mouth 3 (three) times daily as needed for Dizziness. 90 tablet 0    metFORMIN (GLUCOPHAGE-XR) 500 MG ER 24hr tablet TAKE 2 TABLETS TWICE DAILY WITH MEALS 360 tablet 1    oxyCODONE-acetaminophen (PERCOCET) 7.5-325 mg per tablet Take 1 tablet by mouth every 8 (eight) hours as needed.      traZODone (DESYREL) 100 MG tablet Take 1 tablet (100 mg total) by mouth every evening. 90 tablet 0     No current facility-administered medications on file prior to visit.

## 2025-04-22 ENCOUNTER — PATIENT OUTREACH (OUTPATIENT)
Facility: HOSPITAL | Age: 55
End: 2025-04-22
Payer: MEDICARE

## 2025-05-02 ENCOUNTER — APPOINTMENT (OUTPATIENT)
Dept: RADIOLOGY | Facility: CLINIC | Age: 55
End: 2025-05-02
Payer: MEDICARE

## 2025-05-02 ENCOUNTER — OFFICE VISIT (OUTPATIENT)
Dept: FAMILY MEDICINE | Facility: CLINIC | Age: 55
End: 2025-05-02
Payer: MEDICARE

## 2025-05-02 ENCOUNTER — TELEPHONE (OUTPATIENT)
Dept: FAMILY MEDICINE | Facility: CLINIC | Age: 55
End: 2025-05-02
Payer: MEDICARE

## 2025-05-02 ENCOUNTER — RESULTS FOLLOW-UP (OUTPATIENT)
Dept: FAMILY MEDICINE | Facility: CLINIC | Age: 55
End: 2025-05-02

## 2025-05-02 VITALS
SYSTOLIC BLOOD PRESSURE: 128 MMHG | RESPIRATION RATE: 16 BRPM | HEIGHT: 70 IN | TEMPERATURE: 98 F | BODY MASS INDEX: 29.2 KG/M2 | HEART RATE: 85 BPM | DIASTOLIC BLOOD PRESSURE: 64 MMHG | WEIGHT: 204 LBS | OXYGEN SATURATION: 96 %

## 2025-05-02 DIAGNOSIS — M25.511 ACUTE PAIN OF RIGHT SHOULDER: Primary | ICD-10-CM

## 2025-05-02 DIAGNOSIS — M25.511 ACUTE PAIN OF RIGHT SHOULDER: ICD-10-CM

## 2025-05-02 DIAGNOSIS — M79.89 ARM SWELLING: ICD-10-CM

## 2025-05-02 PROCEDURE — 96372 THER/PROPH/DIAG INJ SC/IM: CPT | Mod: ,,,

## 2025-05-02 PROCEDURE — 3066F NEPHROPATHY DOC TX: CPT | Mod: ,,,

## 2025-05-02 PROCEDURE — 3044F HG A1C LEVEL LT 7.0%: CPT | Mod: ,,,

## 2025-05-02 PROCEDURE — 2023F DILAT RTA XM W/O RTNOPTHY: CPT | Mod: ,,,

## 2025-05-02 PROCEDURE — 99213 OFFICE O/P EST LOW 20 MIN: CPT | Mod: 25,,,

## 2025-05-02 PROCEDURE — 3074F SYST BP LT 130 MM HG: CPT | Mod: ,,,

## 2025-05-02 PROCEDURE — 3008F BODY MASS INDEX DOCD: CPT | Mod: ,,,

## 2025-05-02 PROCEDURE — 3078F DIAST BP <80 MM HG: CPT | Mod: ,,,

## 2025-05-02 PROCEDURE — 73030 X-RAY EXAM OF SHOULDER: CPT | Mod: 26,RT,, | Performed by: RADIOLOGY

## 2025-05-02 PROCEDURE — 73030 X-RAY EXAM OF SHOULDER: CPT | Mod: TC,RHCUB,FY,RT

## 2025-05-02 PROCEDURE — 3061F NEG MICROALBUMINURIA REV: CPT | Mod: ,,,

## 2025-05-02 RX ORDER — KETOROLAC TROMETHAMINE 30 MG/ML
30 INJECTION, SOLUTION INTRAMUSCULAR; INTRAVENOUS
Status: COMPLETED | OUTPATIENT
Start: 2025-05-02 | End: 2025-05-02

## 2025-05-02 RX ORDER — KETOROLAC TROMETHAMINE 10 MG/1
10 TABLET, FILM COATED ORAL EVERY 6 HOURS
Qty: 20 TABLET | Refills: 0 | Status: SHIPPED | OUTPATIENT
Start: 2025-05-02 | End: 2025-05-07

## 2025-05-02 RX ADMIN — KETOROLAC TROMETHAMINE 30 MG: 30 INJECTION, SOLUTION INTRAMUSCULAR; INTRAVENOUS at 11:05

## 2025-05-02 NOTE — TELEPHONE ENCOUNTER
----- Message from HAYELY Naqvi sent at 5/2/2025  4:30 PM CDT -----  Normal right shoulder xray. We can go on and refer to orthopedics for further evaluation if pt wants?  ----- Message -----  From: Ina, Rad Results In  Sent: 5/2/2025   3:39 PM CDT  To: HAYLEY Naqvi

## 2025-05-02 NOTE — PROGRESS NOTES
Normal right shoulder xray. We can go on and refer to orthopedics for further evaluation if pt wants?

## 2025-05-05 NOTE — TELEPHONE ENCOUNTER
Patient return to clinic in regards to XRAY results. Informed patient xray was normal and we could refer patient to orthopedic. Patient states at this time everything was better, and shoulder was not hurting. Patient had no further questions at this time.

## 2025-05-08 DIAGNOSIS — Z01.818 ENCOUNTER FOR OTHER PREPROCEDURAL EXAMINATION: Primary | ICD-10-CM

## 2025-05-13 ENCOUNTER — PATIENT MESSAGE (OUTPATIENT)
Dept: FAMILY MEDICINE | Facility: CLINIC | Age: 55
End: 2025-05-13
Payer: MEDICARE

## 2025-05-13 NOTE — TELEPHONE ENCOUNTER
Lets make an appointment for Mr Levy this week as soon as possible. We can reassess and have documentation of the arm and shoulder not really improving and that way we can order MRI for further evaluation.

## 2025-05-13 NOTE — TELEPHONE ENCOUNTER
Contacted patient and scheduled an appointment for closest availability: tomorrow 5/13/2025 at 10AM. Confirmed with patient. Patient verbalized acceptance of scheduled appointment.

## 2025-05-14 ENCOUNTER — OFFICE VISIT (OUTPATIENT)
Dept: FAMILY MEDICINE | Facility: CLINIC | Age: 55
End: 2025-05-14
Payer: MEDICARE

## 2025-05-14 VITALS
TEMPERATURE: 99 F | WEIGHT: 202.63 LBS | HEART RATE: 81 BPM | RESPIRATION RATE: 17 BRPM | HEIGHT: 70 IN | DIASTOLIC BLOOD PRESSURE: 70 MMHG | BODY MASS INDEX: 29.01 KG/M2 | SYSTOLIC BLOOD PRESSURE: 116 MMHG | OXYGEN SATURATION: 99 %

## 2025-05-14 DIAGNOSIS — M25.511 ACUTE PAIN OF RIGHT SHOULDER: Primary | ICD-10-CM

## 2025-05-14 PROCEDURE — 96372 THER/PROPH/DIAG INJ SC/IM: CPT | Mod: ,,,

## 2025-05-14 PROCEDURE — 99213 OFFICE O/P EST LOW 20 MIN: CPT | Mod: 25,,,

## 2025-05-14 PROCEDURE — 3061F NEG MICROALBUMINURIA REV: CPT | Mod: ,,,

## 2025-05-14 PROCEDURE — 1160F RVW MEDS BY RX/DR IN RCRD: CPT | Mod: ,,,

## 2025-05-14 PROCEDURE — 3008F BODY MASS INDEX DOCD: CPT | Mod: ,,,

## 2025-05-14 PROCEDURE — 3044F HG A1C LEVEL LT 7.0%: CPT | Mod: ,,,

## 2025-05-14 PROCEDURE — 3074F SYST BP LT 130 MM HG: CPT | Mod: ,,,

## 2025-05-14 PROCEDURE — 3078F DIAST BP <80 MM HG: CPT | Mod: ,,,

## 2025-05-14 PROCEDURE — 1159F MED LIST DOCD IN RCRD: CPT | Mod: ,,,

## 2025-05-14 PROCEDURE — 3066F NEPHROPATHY DOC TX: CPT | Mod: ,,,

## 2025-05-14 PROCEDURE — 2023F DILAT RTA XM W/O RTNOPTHY: CPT | Mod: ,,,

## 2025-05-14 RX ORDER — KETOROLAC TROMETHAMINE 30 MG/ML
30 INJECTION, SOLUTION INTRAMUSCULAR; INTRAVENOUS
Status: COMPLETED | OUTPATIENT
Start: 2025-05-14 | End: 2025-05-14

## 2025-05-14 RX ORDER — DICLOFENAC SODIUM 50 MG/1
50 TABLET, DELAYED RELEASE ORAL 2 TIMES DAILY
Qty: 60 TABLET | Refills: 0 | Status: SHIPPED | OUTPATIENT
Start: 2025-05-14

## 2025-05-14 RX ADMIN — KETOROLAC TROMETHAMINE 30 MG: 30 INJECTION, SOLUTION INTRAMUSCULAR; INTRAVENOUS at 11:05

## 2025-05-14 NOTE — PROGRESS NOTES
BEATRICE PARKS, HAYLEY   [facility]  [address]  [place]  [address]        PATIENT NAME: Bruno Levy  : 1970  DATE: 25  MRN: [id number]        Reason for Visit / Chief Complaint: Shoulder Pain (Diagnosed with acute pain of right shoulder 2025. Patient returned for a follow up visit due to no improvement with treatment plan. He wore the sling/brace x1 week and his shoulder felt some better. Now it's hurting worse again and right arm from shoulder to tips of fingers swell. He has been wrapping upper arm and shoulder with ace bandage and using ice 3-4 times daily with little to no relief.) and Health Maintenance (HIV Screening Never done/Colorectal Cancer Screening Never done/Pneumococcal Vaccines (Age 50+)(2 of 2 - PCV) due on 2016/LDCT Lung Screen due on 2020/Shingles Vaccine(1 of 2) Never done/COVID-19 Vaccine( season) Never done/Patient does not want to discuss care gaps until his pain level is better)       Update PCP  Update Chief Complaint         History of Present Illness / Problem Focused Workflow     Bruno Levy presents to the clinic with Shoulder Pain (Diagnosed with acute pain of right shoulder 2025. Patient returned for a follow up visit due to no improvement with treatment plan. He wore the sling/brace x1 week and his shoulder felt some better. Now it's hurting worse again and right arm from shoulder to tips of fingers swell. He has been wrapping upper arm and shoulder with ace bandage and using ice 3-4 times daily with little to no relief.) and Health Maintenance (HIV Screening Never done/Colorectal Cancer Screening Never done/Pneumococcal Vaccines (Age 50+)(2 of 2 - PCV) due on 2016/LDCT Lung Screen due on 2020/Shingles Vaccine(1 of 2) Never done/COVID-19 Vaccine( season) Never done/Patient does not want to discuss care gaps until his pain level is better)     Shoulder Pain   The pain is present in the right  shoulder and right arm. The current episode started 1 to 4 weeks ago. There has been a history of trauma (moving house and lifitng furniture and boxes). The problem has been gradually worsening. The quality of the pain is described as aching and dull. The pain is at a severity of 7/10. The pain is moderate. Associated symptoms include a limited range of motion. Pertinent negatives include no fever, headaches, numbness or tingling. The symptoms are aggravated by activity. He has tried NSAIDS, heat and rest for the symptoms. The treatment provided no relief. His past medical history is significant for diabetes.       Review of Systems     Review of Systems   Constitutional:  Negative for chills, fatigue and fever.   HENT:  Negative for nasal congestion, ear discharge, ear pain, rhinorrhea, sinus pressure/congestion and sore throat.    Respiratory:  Negative for cough, chest tightness, shortness of breath, wheezing and stridor.    Cardiovascular:  Negative for palpitations and claudication.   Gastrointestinal:  Negative for abdominal pain, constipation, diarrhea, nausea, vomiting and reflux.   Genitourinary:  Negative for dysuria, flank pain, frequency, hematuria and urgency.   Musculoskeletal:  Negative for myalgias.        Right shoulder and right arm pain   Neurological:  Negative for dizziness, tingling, weakness, light-headedness, numbness and headaches.   Psychiatric/Behavioral:  Negative for suicidal ideas.         Medical / Social / Family History     Past Medical History:   Diagnosis Date    Depression     Diabetes mellitus, type 2     History of methicillin resistant staphylococcus aureus (MRSA)     Hyperlipidemia     Hypertension     Neuropathy     Osteomyelitis        Past Surgical History:   Procedure Laterality Date    FRACTURE SURGERY  1976    arm    LUMBAR FUSION      NASAL SEPTUM SURGERY      SPINAL CORD STIMULATOR IMPLANT      SPINAL CORD STIMULATOR REMOVAL      SPINE SURGERY  2017    TOE AMPUTATION       rt foot great to and 5th toe;lt foot great,4th and 5th toes.       Social History    reports that he quit smoking about 3 years ago. His smoking use included vaping with nicotine and cigarettes. He started smoking about 4 years ago. He has a 1 pack-year smoking history. He has been exposed to tobacco smoke. He has never used smokeless tobacco. He reports that he does not currently use alcohol. He reports current drug use. Drug: Oxycodone.    Family History  's family history includes Alcohol abuse in his maternal grandmother; Arthritis in his father; Bladder Cancer in his father; COPD in his mother; Cancer in his father; Drug abuse in his brother; Hypertension in his brother; Learning disabilities in his daughter; Lung cancer in his father.    Medications and Allergies     Medications  Outpatient Medications Marked as Taking for the 5/14/25 encounter (Office Visit) with Franklyn Acosta FNP   Medication Sig Dispense Refill    amLODIPine (NORVASC) 5 MG tablet Take 2 tablets (10 mg total) by mouth once daily. 180 tablet 1    atorvastatin (LIPITOR) 40 MG tablet Take 1 tablet (40 mg total) by mouth once daily. 90 tablet 1    baclofen (LIORESAL) 20 MG tablet Take 20 mg by mouth every 6 (six) hours as needed.      blood sugar diagnostic Strp 1 strip by Misc.(Non-Drug; Combo Route) route once daily. 100 each 1    blood-glucose meter (TRUE METRIX AIR GLUCOSE METER) Misc 1 each by Other route 4 (four) times daily before meals and nightly. 1 each 0    fluticasone propionate (FLONASE) 50 mcg/actuation nasal spray 1 spray (50 mcg total) by Each Nostril route once daily. 16 g 0    gabapentin (NEURONTIN) 600 MG tablet 1.5 tablets every 6 hours      glimepiride (AMARYL) 2 MG tablet Take 1 tablet (2 mg total) by mouth before breakfast. 90 tablet 0    lancets (TRUEPLUS LANCETS) 33 gauge Misc 1 lancet  by Other route before meals, at bedtime and at 0200. 100 each 0    meclizine (ANTIVERT) 25 mg tablet Take 1 tablet (25 mg  "total) by mouth 3 (three) times daily as needed for Dizziness. 90 tablet 0    metFORMIN (GLUCOPHAGE-XR) 500 MG ER 24hr tablet TAKE 2 TABLETS TWICE DAILY WITH MEALS 360 tablet 1    oxyCODONE-acetaminophen (PERCOCET) 7.5-325 mg per tablet Take 1 tablet by mouth every 8 (eight) hours as needed.      traZODone (DESYREL) 100 MG tablet Take 1 tablet (100 mg total) by mouth every evening. 90 tablet 0     Current Facility-Administered Medications for the 5/14/25 encounter (Office Visit) with Franklyn Acosta FNP   Medication Dose Route Frequency Provider Last Rate Last Admin    [COMPLETED] ketorolac injection 30 mg  30 mg Intramuscular 1 time in Clinic/HOD Franklyn Acosta FNP   30 mg at 05/14/25 1107       Allergies  Review of patient's allergies indicates:   Allergen Reactions    Levofloxacin (bulk) Other (See Comments)     Worsened muscle spasms       Physical Examination   /70 (BP Location: Left arm, Patient Position: Sitting)   Pulse 81   Temp 98.5 °F (36.9 °C) (Oral)   Resp 17   Ht 5' 10" (1.778 m)   Wt 91.9 kg (202 lb 9.6 oz)   SpO2 99%   BMI 29.07 kg/m²    Physical Exam  Vitals and nursing note reviewed.   Constitutional:       General: He is awake.      Appearance: Normal appearance.   HENT:      Head: Normocephalic.      Right Ear: Tympanic membrane, ear canal and external ear normal.      Left Ear: Tympanic membrane, ear canal and external ear normal.      Nose: Nose normal.      Mouth/Throat:      Lips: Pink.      Mouth: Mucous membranes are moist.      Pharynx: Oropharynx is clear. Uvula midline.   Cardiovascular:      Rate and Rhythm: Normal rate and regular rhythm.      Heart sounds: Normal heart sounds, S1 normal and S2 normal.   Pulmonary:      Effort: Pulmonary effort is normal. No respiratory distress.      Breath sounds: Normal breath sounds. No decreased breath sounds, wheezing, rhonchi or rales.   Abdominal:      General: Bowel sounds are normal.      Palpations: Abdomen is soft.      " Tenderness: There is no abdominal tenderness.   Musculoskeletal:      Right shoulder: Tenderness present. Decreased range of motion.      Right upper arm: Tenderness present.      Cervical back: Normal range of motion.   Skin:     General: Skin is warm.      Capillary Refill: Capillary refill takes less than 2 seconds.   Neurological:      Mental Status: He is alert and oriented to person, place, and time.   Psychiatric:         Thought Content: Thought content does not include homicidal or suicidal ideation. Thought content does not include homicidal or suicidal plan.          Assessment and Plan (including Health Maintenance)      Problem List  Smart Sets  Document Outside          Health Maintenance Due   Topic Date Due    HIV Screening  Never done    Colorectal Cancer Screening  Never done    Pneumococcal Vaccines (Age 50+) (2 of 2 - PCV) 08/28/2016    Shingles Vaccine (1 of 2) Never done    COVID-19 Vaccine (1 - 2024-25 season) Never done       Problem List Items Addressed This Visit       Acute pain of right shoulder - Primary    Current Assessment & Plan   Pt states sling and medication did improve slightly right shoulder pain but returned after finished medication. Pt still having pain and edema right shoulder and right arm. This started when he started moving his house about a month ago. Pain and edema not really improving. Toradol IM today. Diclofenac RX. Will order shoulder MRI for further evaluation.          Relevant Medications    ketorolac injection 30 mg (Completed)    diclofenac (VOLTAREN) 50 MG EC tablet    Other Relevant Orders    MRI Shoulder Without Contrast Right       Health Maintenance Topics with due status: Not Due       Topic Last Completion Date    TETANUS VACCINE 08/04/2023    Diabetic Eye Exam 01/27/2025    PROSTATE-SPECIFIC ANTIGEN 04/14/2025    Diabetes Urine Screening 04/14/2025    Foot Exam 04/14/2025    Lipid Panel 04/14/2025    Hemoglobin A1c 04/14/2025    Low Dose Statin  05/14/2025    RSV Vaccine (Age 60+ and Pregnant patients) Not Due       Future Appointments   Date Time Provider Department Center   7/14/2025  8:00 AM Franklyn Acosta FNP [facility]   11/13/2025  1:00 PM AWV NURSE [facility] [place] [facility]            Signature:      HAYLEY RIOS [address] [place]  [address]       Date of encounter: 5/14/25

## 2025-05-14 NOTE — ASSESSMENT & PLAN NOTE
Pt states sling and medication did improve slightly right shoulder pain but returned after finished medication. Pt still having pain and edema right shoulder and right arm. This started when he started moving his house about a month ago. Pain and edema not really improving. Toradol IM today. Diclofenac RX. Will order shoulder MRI for further evaluation.

## 2025-05-17 PROBLEM — J01.00 ACUTE MAXILLARY SINUSITIS: Status: RESOLVED | Noted: 2025-01-06 | Resolved: 2025-05-17

## 2025-05-17 PROBLEM — H60.501 ACUTE OTITIS EXTERNA OF RIGHT EAR: Status: RESOLVED | Noted: 2021-10-20 | Resolved: 2025-05-17

## 2025-05-18 ENCOUNTER — PATIENT MESSAGE (OUTPATIENT)
Dept: FAMILY MEDICINE | Facility: CLINIC | Age: 55
End: 2025-05-18
Payer: MEDICARE

## 2025-05-18 NOTE — ASSESSMENT & PLAN NOTE
Toradol IM today. Xray right shoulder today and will call with xray results and any new orders. Toradol RX today. Medication instructions and sling given with understanding voiced. RTC with worsening, new or persistent symptoms

## 2025-05-18 NOTE — PROGRESS NOTES
BEATRICE PARKS, HAYLEY   [facility]  [address]  [place]  [address]        PATIENT NAME: Bruno Levy  : 1970  DATE: 25  MRN: [id number]        Reason for Visit / Chief Complaint: Arm Pain (Patient is a 54 year old male who presents to the clinic related to right arm/shoulder pain since Tuesday.  Swelling noted to right arm, patient reports pain radiating from left collar bone to left elbow.  Patient has been moving heavy furniture x 2 weeks,  mostly by himself.  Using Icy Hot and icing it. Hurts to lift or bend arm. ) and Health Maintenance (HIV Screening Never done/Colorectal Cancer Screening Never done/Pneumococcal Vaccines (Age 50+)(2 of 2 - PCV) due on 2016/LDCT Lung Screen due on 2020/Shingles Vaccine(1 of 2) Never done/COVID-19 Vaccine( season) Never done /Patient declines all care gaps at this time. //)       Update PCP  Update Chief Complaint         History of Present Illness / Problem Focused Workflow     Bruno Levy presents to the clinic with Arm Pain (Patient is a 54 year old male who presents to the clinic related to right arm/shoulder pain since Tuesday.  Swelling noted to right arm, patient reports pain radiating from left collar bone to left elbow.  Patient has been moving heavy furniture x 2 weeks,  mostly by himself.  Using Icy Hot and icing it. Hurts to lift or bend arm. ) and Health Maintenance (HIV Screening Never done/Colorectal Cancer Screening Never done/Pneumococcal Vaccines (Age 50+)(2 of 2 - PCV) due on 2016/LDCT Lung Screen due on 2020/Shingles Vaccine(1 of 2) Never done/COVID-19 Vaccine( season) Never done /Patient declines all care gaps at this time. //)     55 y/o male presents to clinic with complaints of right shoulder/arm pain that started about 2 weeks ago. He denies any known injury but states he has been moving and picking up boxes and furniture. Rates pain 6/10 today        Review of Systems      Review of Systems   Constitutional:  Negative for chills, fatigue and fever.   HENT:  Negative for nasal congestion, ear discharge, ear pain, rhinorrhea, sinus pressure/congestion and sore throat.    Respiratory:  Negative for cough, chest tightness, shortness of breath, wheezing and stridor.    Cardiovascular:  Negative for palpitations and claudication.   Gastrointestinal:  Negative for abdominal pain, constipation, diarrhea, nausea, vomiting and reflux.   Genitourinary:  Negative for dysuria, flank pain, frequency, hematuria and urgency.   Musculoskeletal:  Negative for myalgias.        Right shoulder right arm pain   Neurological:  Negative for dizziness, weakness, light-headedness and headaches.   Psychiatric/Behavioral:  Negative for suicidal ideas.         Medical / Social / Family History     Past Medical History:   Diagnosis Date    Depression     Diabetes mellitus, type 2     History of methicillin resistant staphylococcus aureus (MRSA)     Hyperlipidemia     Hypertension     Neuropathy     Osteomyelitis        Past Surgical History:   Procedure Laterality Date    FRACTURE SURGERY  1976    arm    LUMBAR FUSION      NASAL SEPTUM SURGERY      SPINAL CORD STIMULATOR IMPLANT      SPINAL CORD STIMULATOR REMOVAL      SPINE SURGERY  2017    TOE AMPUTATION      rt foot great to and 5th toe;lt foot great,4th and 5th toes.       Social History    reports that he quit smoking about 3 years ago. His smoking use included vaping with nicotine and cigarettes. He started smoking about 4 years ago. He has a 1 pack-year smoking history. He has been exposed to tobacco smoke. He has never used smokeless tobacco. He reports that he does not currently use alcohol. He reports current drug use. Drug: Oxycodone.    Family History  's family history includes Alcohol abuse in his maternal grandmother; Arthritis in his father; Bladder Cancer in his father; COPD in his mother; Cancer in his father; Drug abuse in his brother;  Hypertension in his brother; Learning disabilities in his daughter; Lung cancer in his father.    Medications and Allergies     Medications  Outpatient Medications Marked as Taking for the 5/2/25 encounter (Office Visit) with Franklyn Acosta FNP   Medication Sig Dispense Refill    amLODIPine (NORVASC) 5 MG tablet Take 2 tablets (10 mg total) by mouth once daily. 180 tablet 1    atorvastatin (LIPITOR) 40 MG tablet Take 1 tablet (40 mg total) by mouth once daily. 90 tablet 1    baclofen (LIORESAL) 20 MG tablet Take 20 mg by mouth every 6 (six) hours as needed.      blood sugar diagnostic Strp 1 strip by Misc.(Non-Drug; Combo Route) route once daily. 100 each 1    blood-glucose meter (TRUE METRIX AIR GLUCOSE METER) Misc 1 each by Other route 4 (four) times daily before meals and nightly. 1 each 0    fluticasone propionate (FLONASE) 50 mcg/actuation nasal spray 1 spray (50 mcg total) by Each Nostril route once daily. 16 g 0    gabapentin (NEURONTIN) 600 MG tablet 1.5 tablets every 6 hours      glimepiride (AMARYL) 2 MG tablet Take 1 tablet (2 mg total) by mouth before breakfast. 90 tablet 0    lancets (TRUEPLUS LANCETS) 33 gauge Misc 1 lancet  by Other route before meals, at bedtime and at 0200. 100 each 0    meclizine (ANTIVERT) 25 mg tablet Take 1 tablet (25 mg total) by mouth 3 (three) times daily as needed for Dizziness. 90 tablet 0    metFORMIN (GLUCOPHAGE-XR) 500 MG ER 24hr tablet TAKE 2 TABLETS TWICE DAILY WITH MEALS 360 tablet 1    oxyCODONE-acetaminophen (PERCOCET) 7.5-325 mg per tablet Take 1 tablet by mouth every 8 (eight) hours as needed.      traZODone (DESYREL) 100 MG tablet Take 1 tablet (100 mg total) by mouth every evening. 90 tablet 0       Allergies  Review of patient's allergies indicates:   Allergen Reactions    Levofloxacin (bulk) Other (See Comments)     Worsened muscle spasms       Physical Examination   /64 (BP Location: Left arm, Patient Position: Sitting)   Pulse 85   Temp 98.4 °F  "(36.9 °C) (Oral)   Resp 16   Ht 5' 10" (1.778 m)   Wt 92.5 kg (204 lb)   SpO2 96%   BMI 29.27 kg/m²    Physical Exam  Vitals and nursing note reviewed.   Constitutional:       General: He is awake.      Appearance: Normal appearance.   HENT:      Head: Normocephalic.      Right Ear: Tympanic membrane, ear canal and external ear normal.      Left Ear: Tympanic membrane, ear canal and external ear normal.      Nose: Nose normal.      Mouth/Throat:      Lips: Pink.      Mouth: Mucous membranes are moist.      Pharynx: Oropharynx is clear. Uvula midline.   Cardiovascular:      Rate and Rhythm: Normal rate and regular rhythm.      Heart sounds: Normal heart sounds, S1 normal and S2 normal.   Pulmonary:      Effort: Pulmonary effort is normal. No respiratory distress.      Breath sounds: Normal breath sounds. No decreased breath sounds, wheezing, rhonchi or rales.   Abdominal:      General: Bowel sounds are normal.      Palpations: Abdomen is soft.      Tenderness: There is no abdominal tenderness.   Musculoskeletal:      Right shoulder: Tenderness present. Decreased range of motion.      Right upper arm: Tenderness present.      Right hand: Tenderness present. No lacerations or bony tenderness. Normal range of motion. Normal strength. Normal sensation.      Cervical back: Normal range of motion.   Skin:     General: Skin is warm.      Capillary Refill: Capillary refill takes less than 2 seconds.   Neurological:      Mental Status: He is alert and oriented to person, place, and time.   Psychiatric:         Thought Content: Thought content does not include homicidal or suicidal ideation. Thought content does not include homicidal or suicidal plan.          Assessment and Plan (including Health Maintenance)      Problem List  Smart Sets  Document Outside HM   :    Plan: Follow up with worsening, new or persistent symptoms        Health Maintenance Due   Topic Date Due    HIV Screening  Never done    Colorectal Cancer " Screening  Never done    Pneumococcal Vaccines (Age 50+) (2 of 2 - PCV) 08/28/2016    Shingles Vaccine (1 of 2) Never done    COVID-19 Vaccine (1 - 2024-25 season) Never done       Problem List Items Addressed This Visit       Acute pain of right shoulder - Primary    Current Assessment & Plan   Toradol IM today. Xray right shoulder today and will call with xray results and any new orders. Toradol RX today. Medication instructions and sling given with understanding voiced. RTC with worsening, new or persistent symptoms         Relevant Orders    X-ray Shoulder 2 or More Views Right (Completed)     Other Visit Diagnoses         Arm swelling        Relevant Orders    X-ray Shoulder 2 or More Views Right (Completed)            Health Maintenance Topics with due status: Not Due       Topic Last Completion Date    TETANUS VACCINE 08/04/2023    Diabetic Eye Exam 01/27/2025    PROSTATE-SPECIFIC ANTIGEN 04/14/2025    Diabetes Urine Screening 04/14/2025    Foot Exam 04/14/2025    Lipid Panel 04/14/2025    Hemoglobin A1c 04/14/2025    Low Dose Statin 05/14/2025    RSV Vaccine (Age 60+ and Pregnant patients) Not Due       Future Appointments   Date Time Provider Department Center   7/14/2025  8:00 AM Franklyn Acosta FNP [facility]   11/13/2025  1:00 PM AWV NURSE [facility]            Signature:      [facility]  [address] [place] [address] [address]  [place]  [address]       Date of encounter: 5/2/25

## 2025-05-21 PROBLEM — G56.02 CARPAL TUNNEL SYNDROME OF LEFT WRIST: Status: ACTIVE | Noted: 2025-04-21

## 2025-05-22 ENCOUNTER — HOSPITAL ENCOUNTER (OUTPATIENT)
Facility: HOSPITAL | Age: 55
Discharge: HOME | End: 2025-05-22
Attending: ORTHOPAEDIC SURGERY | Admitting: ORTHOPAEDIC SURGERY
Payer: MEDICARE

## 2025-05-22 ENCOUNTER — ANESTHESIA EVENT (OUTPATIENT)
Dept: SURGERY | Facility: HOSPITAL | Age: 55
End: 2025-05-22
Payer: MEDICARE

## 2025-05-22 ENCOUNTER — ANESTHESIA (OUTPATIENT)
Dept: SURGERY | Facility: HOSPITAL | Age: 55
End: 2025-05-22
Payer: MEDICARE

## 2025-05-22 VITALS
HEART RATE: 75 BPM | TEMPERATURE: 98 F | HEIGHT: 70 IN | OXYGEN SATURATION: 95 % | RESPIRATION RATE: 16 BRPM | WEIGHT: 205 LBS | BODY MASS INDEX: 29.35 KG/M2 | SYSTOLIC BLOOD PRESSURE: 118 MMHG | DIASTOLIC BLOOD PRESSURE: 76 MMHG

## 2025-05-22 DIAGNOSIS — G56.02 CARPAL TUNNEL SYNDROME OF LEFT WRIST: Primary | ICD-10-CM

## 2025-05-22 LAB
GLUCOSE SERPL-MCNC: 116 MG/DL (ref 70–105)
GLUCOSE SERPL-MCNC: 129 MG/DL (ref 70–105)

## 2025-05-22 PROCEDURE — 63600175 PHARM REV CODE 636 W HCPCS: Performed by: ORTHOPAEDIC SURGERY

## 2025-05-22 PROCEDURE — 64721 CARPAL TUNNEL SURGERY: CPT | Mod: LT,,, | Performed by: ORTHOPAEDIC SURGERY

## 2025-05-22 PROCEDURE — 37000008 HC ANESTHESIA 1ST 15 MINUTES: Performed by: ORTHOPAEDIC SURGERY

## 2025-05-22 PROCEDURE — 71000033 HC RECOVERY, INTIAL HOUR: Performed by: ORTHOPAEDIC SURGERY

## 2025-05-22 PROCEDURE — 25000003 PHARM REV CODE 250: Performed by: ORTHOPAEDIC SURGERY

## 2025-05-22 PROCEDURE — 71000015 HC POSTOP RECOV 1ST HR: Performed by: ORTHOPAEDIC SURGERY

## 2025-05-22 PROCEDURE — 36000707: Performed by: ORTHOPAEDIC SURGERY

## 2025-05-22 PROCEDURE — 36000706: Performed by: ORTHOPAEDIC SURGERY

## 2025-05-22 PROCEDURE — 27000177 HC AIRWAY, LARYNGEAL MASK: Performed by: ANESTHESIOLOGY

## 2025-05-22 PROCEDURE — D9220A PRA ANESTHESIA: Mod: CRNA,,, | Performed by: NURSE ANESTHETIST, CERTIFIED REGISTERED

## 2025-05-22 PROCEDURE — D9220A PRA ANESTHESIA: Mod: ANES,,, | Performed by: ANESTHESIOLOGY

## 2025-05-22 PROCEDURE — 82962 GLUCOSE BLOOD TEST: CPT

## 2025-05-22 PROCEDURE — 37000009 HC ANESTHESIA EA ADD 15 MINS: Performed by: ORTHOPAEDIC SURGERY

## 2025-05-22 PROCEDURE — 63600175 PHARM REV CODE 636 W HCPCS: Performed by: NURSE ANESTHETIST, CERTIFIED REGISTERED

## 2025-05-22 PROCEDURE — 27000716 HC OXISENSOR PROBE, ANY SIZE: Performed by: ANESTHESIOLOGY

## 2025-05-22 PROCEDURE — 27000655: Performed by: ANESTHESIOLOGY

## 2025-05-22 RX ORDER — LIDOCAINE HYDROCHLORIDE 20 MG/ML
INJECTION, SOLUTION EPIDURAL; INFILTRATION; INTRACAUDAL; PERINEURAL
Status: DISCONTINUED | OUTPATIENT
Start: 2025-05-22 | End: 2025-05-22

## 2025-05-22 RX ORDER — GLYCOPYRROLATE 0.2 MG/ML
INJECTION INTRAMUSCULAR; INTRAVENOUS
Status: DISCONTINUED | OUTPATIENT
Start: 2025-05-22 | End: 2025-05-22

## 2025-05-22 RX ORDER — SODIUM CHLORIDE 9 MG/ML
INJECTION, SOLUTION INTRAVENOUS CONTINUOUS
Status: DISCONTINUED | OUTPATIENT
Start: 2025-05-22 | End: 2025-05-22 | Stop reason: HOSPADM

## 2025-05-22 RX ORDER — CEFAZOLIN 2 G/1
2 INJECTION, POWDER, FOR SOLUTION INTRAMUSCULAR; INTRAVENOUS
Status: DISCONTINUED | OUTPATIENT
Start: 2025-05-22 | End: 2025-05-22 | Stop reason: HOSPADM

## 2025-05-22 RX ORDER — PROMETHAZINE HYDROCHLORIDE 25 MG/1
25 TABLET ORAL EVERY 6 HOURS PRN
Status: DISCONTINUED | OUTPATIENT
Start: 2025-05-22 | End: 2025-05-22 | Stop reason: HOSPADM

## 2025-05-22 RX ORDER — BUPIVACAINE HYDROCHLORIDE 2.5 MG/ML
INJECTION, SOLUTION EPIDURAL; INFILTRATION; INTRACAUDAL; PERINEURAL
Status: DISCONTINUED | OUTPATIENT
Start: 2025-05-22 | End: 2025-05-22 | Stop reason: HOSPADM

## 2025-05-22 RX ORDER — ACETAMINOPHEN 500 MG
1000 TABLET ORAL EVERY 6 HOURS PRN
Status: DISCONTINUED | OUTPATIENT
Start: 2025-05-22 | End: 2025-05-22 | Stop reason: HOSPADM

## 2025-05-22 RX ORDER — MEPERIDINE HYDROCHLORIDE 25 MG/ML
25 INJECTION INTRAMUSCULAR; INTRAVENOUS; SUBCUTANEOUS EVERY 10 MIN PRN
Status: DISCONTINUED | OUTPATIENT
Start: 2025-05-22 | End: 2025-05-22 | Stop reason: HOSPADM

## 2025-05-22 RX ORDER — HYDROCODONE BITARTRATE AND ACETAMINOPHEN 5; 325 MG/1; MG/1
1 TABLET ORAL EVERY 4 HOURS PRN
Status: DISCONTINUED | OUTPATIENT
Start: 2025-05-22 | End: 2025-05-22 | Stop reason: HOSPADM

## 2025-05-22 RX ORDER — ONDANSETRON HYDROCHLORIDE 2 MG/ML
4 INJECTION, SOLUTION INTRAVENOUS DAILY PRN
Status: DISCONTINUED | OUTPATIENT
Start: 2025-05-22 | End: 2025-05-22 | Stop reason: HOSPADM

## 2025-05-22 RX ORDER — PROPOFOL 10 MG/ML
VIAL (ML) INTRAVENOUS
Status: DISCONTINUED | OUTPATIENT
Start: 2025-05-22 | End: 2025-05-22

## 2025-05-22 RX ORDER — ONDANSETRON 4 MG/1
8 TABLET, ORALLY DISINTEGRATING ORAL EVERY 8 HOURS PRN
Status: DISCONTINUED | OUTPATIENT
Start: 2025-05-22 | End: 2025-05-22 | Stop reason: HOSPADM

## 2025-05-22 RX ORDER — HYDROMORPHONE HYDROCHLORIDE 2 MG/ML
0.5 INJECTION, SOLUTION INTRAMUSCULAR; INTRAVENOUS; SUBCUTANEOUS EVERY 5 MIN PRN
Status: DISCONTINUED | OUTPATIENT
Start: 2025-05-22 | End: 2025-05-22 | Stop reason: HOSPADM

## 2025-05-22 RX ORDER — DIPHENHYDRAMINE HYDROCHLORIDE 50 MG/ML
25 INJECTION, SOLUTION INTRAMUSCULAR; INTRAVENOUS EVERY 6 HOURS PRN
Status: DISCONTINUED | OUTPATIENT
Start: 2025-05-22 | End: 2025-05-22 | Stop reason: HOSPADM

## 2025-05-22 RX ORDER — ONDANSETRON HYDROCHLORIDE 2 MG/ML
INJECTION, SOLUTION INTRAVENOUS
Status: DISCONTINUED | OUTPATIENT
Start: 2025-05-22 | End: 2025-05-22

## 2025-05-22 RX ORDER — FENTANYL CITRATE 50 UG/ML
INJECTION, SOLUTION INTRAMUSCULAR; INTRAVENOUS
Status: DISCONTINUED | OUTPATIENT
Start: 2025-05-22 | End: 2025-05-22

## 2025-05-22 RX ORDER — HYDROCODONE BITARTRATE AND ACETAMINOPHEN 10; 325 MG/1; MG/1
1 TABLET ORAL EVERY 4 HOURS PRN
Status: DISCONTINUED | OUTPATIENT
Start: 2025-05-22 | End: 2025-05-22 | Stop reason: HOSPADM

## 2025-05-22 RX ORDER — MORPHINE SULFATE 10 MG/ML
4 INJECTION INTRAMUSCULAR; INTRAVENOUS; SUBCUTANEOUS EVERY 5 MIN PRN
Status: DISCONTINUED | OUTPATIENT
Start: 2025-05-22 | End: 2025-05-22 | Stop reason: HOSPADM

## 2025-05-22 RX ORDER — HYDROCODONE BITARTRATE AND ACETAMINOPHEN 5; 325 MG/1; MG/1
1 TABLET ORAL EVERY 6 HOURS PRN
Qty: 28 TABLET | Refills: 0 | Status: SHIPPED | OUTPATIENT
Start: 2025-05-22 | End: 2025-05-29

## 2025-05-22 RX ORDER — CEFAZOLIN SODIUM 1 G/3ML
INJECTION, POWDER, FOR SOLUTION INTRAMUSCULAR; INTRAVENOUS
Status: DISCONTINUED | OUTPATIENT
Start: 2025-05-22 | End: 2025-05-22

## 2025-05-22 RX ORDER — GLUCAGON 1 MG
1 KIT INJECTION
Status: DISCONTINUED | OUTPATIENT
Start: 2025-05-22 | End: 2025-05-22 | Stop reason: HOSPADM

## 2025-05-22 RX ADMIN — LIDOCAINE HYDROCHLORIDE 50 MG: 20 INJECTION, SOLUTION INTRAVENOUS at 08:05

## 2025-05-22 RX ADMIN — ONDANSETRON 4 MG: 2 INJECTION INTRAMUSCULAR; INTRAVENOUS at 09:05

## 2025-05-22 RX ADMIN — FENTANYL CITRATE 50 MCG: 50 INJECTION, SOLUTION INTRAMUSCULAR; INTRAVENOUS at 08:05

## 2025-05-22 RX ADMIN — PROPOFOL 180 MG: 10 INJECTION, EMULSION INTRAVENOUS at 08:05

## 2025-05-22 RX ADMIN — SODIUM CHLORIDE: 9 INJECTION, SOLUTION INTRAVENOUS at 08:05

## 2025-05-22 RX ADMIN — GLYCOPYRROLATE 0.4 MG: 0.2 INJECTION INTRAMUSCULAR; INTRAVENOUS at 09:05

## 2025-05-22 RX ADMIN — CEFAZOLIN 2 G: 1 INJECTION, POWDER, FOR SOLUTION INTRAMUSCULAR; INTRAVENOUS; PARENTERAL at 09:05

## 2025-05-22 RX ADMIN — SODIUM CHLORIDE: 9 INJECTION, SOLUTION INTRAVENOUS at 06:05

## 2025-05-22 RX ADMIN — FENTANYL CITRATE 100 MCG: 50 INJECTION, SOLUTION INTRAMUSCULAR; INTRAVENOUS at 08:05

## 2025-05-22 NOTE — OP NOTE
[facility] - Orthopedic Periop Services  Surgery Department  Operative Note    SUMMARY     Date of Procedure: 5/22/2025     Procedure: Procedure(s) (LRB):  RELEASE, CARPAL TUNNEL (Left)     Surgeons and Role:     * Rock Rehman MD - Primary    Assisting Surgeon: None    Pre-Operative Diagnosis: Bilateral carpal tunnel syndrome [G56.03]    Post-Operative Diagnosis: Post-Op Diagnosis Codes:     * Bilateral carpal tunnel syndrome [G56.03]    Anesthesia: General    Technical Procedures Used:  Left carpal tunnel surgical release             DEPARTMENT OF ORTHOPEDIC SURGERY                OPERATIVE REPORT     NAME:  Bruno Levy  MRN: [id number]               DATE OF SURGERY:  05/22/2025       PREOPERATIVE DIAGNOSIS:  left carpal tunnel syndrome    POSTOPERATIVE DIAGNOSIS:  left carpal tunnel syndrome    ANESTHESIA:  Alpesh block    PROCEDURE:  leftcarpal tunnel surgical release    PROCEDURE IN DETAIL:  The patient was taken to the operating room and placed in the supine position.  After an adequate level of regional alpesh block anesthesia had been achieved (SeeAnesthesia Note). The patients left upper extremity was scrubbed with Betadine and prepped in sterile fashion.  The operation was begun by making a linear skin incision from mid palm to the distal volar wrist crease.  The incision was carried carefully through the subcutaneous layers.  Now the transverse palmar fascia and volar carpal ligament were incised in line with the skin incision. A freer elevator was placed between the overlying fascia and underlying carpal canal contents.  Inspection revealed flexor tenosynovitis.  Inspection of the median nerve revealed constrictions beneath the volar carpal ligament.  A careful external neurolysis was performed using Loupe magnification.  A limited tenosynovectomy was performed.  Skin margins were infiltrated with ½% Marcaine without Epinephrine.   The tourniquet was let down and hemostasis was achieved with  the bipolar electrocautery.  The wound was irrigated copiously with antibiotic solution and the skin margins were reapproximated with running 5-0 Nylon suture.  The wound was dressed sterilely.  The patient was taken to the recovery room in satisfactory condition.  Estimated blood loss:  Minimal.  The patient received one gram of Ancef antibiotic intravenously prior to the procedure. Tourniquet time:  9 minute    Rock Rehman       Description of the Findings of the Procedure:  Flexor tenosynovitis/carpal tunnel syndrome left wrist    Significant Surgical Tasks Conducted by the Assistant(s), if Applicable:  None    Complications: No    Estimated Blood Loss (EBL): * No values recorded between 5/22/2025  8:48 AM and 5/22/2025  9:42 AM *           Implants: * No implants in log *    Specimens:   Specimen (24h ago, onward)      None                    Condition: Good    Disposition: PACU - hemodynamically stable.    Attestation: I was present and scrubbed for the entire procedure.

## 2025-05-22 NOTE — BRIEF OP NOTE
[facility] - Orthopedic Periop Services  Brief Operative Note    Surgery Date: 5/22/2025     Surgeons and Role:     * Rock Rehman MD - Primary    Assisting Surgeon: None    Pre-op Diagnosis:  Bilateral carpal tunnel syndrome [G56.03]    Post-op Diagnosis:  Post-Op Diagnosis Codes:     * Bilateral carpal tunnel syndrome [G56.03]    Procedure(s) (LRB):  RELEASE, CARPAL TUNNEL (Left)    Anesthesia: General    Description of the findings of the procedure(s): See Op Note     Estimated Blood Loss: * No values recorded between 5/22/2025  8:48 AM and 5/22/2025  9:42 AM *3cc         Specimens:   Specimen (24h ago, onward)      None              Discharge Note    OUTCOME: Patient tolerated treatment/procedure well without complication and is now ready for discharge.    DISPOSITION: Home or Self Care    FINAL DIAGNOSIS:  Carpal tunnel syndrome of left wrist    FOLLOWUP: In clinic    DISCHARGE INSTRUCTIONS:    Discharge Procedure Orders   Diet general     Keep surgical extremity elevated     Ice to affected area   Order Comments: using barrier between ice and skin (specify duration&frequency)     Call MD for:  temperature >100.4     Call MD for:  persistent nausea and vomiting     Call MD for:  severe uncontrolled pain     Call MD for:  difficulty breathing, headache or visual disturbances     Call MD for:  redness, tenderness, or signs of infection (pain, swelling, redness, odor or green/yellow discharge around incision site)     Call MD for:  hives     Call MD for:  persistent dizziness or light-headedness     Call MD for:  extreme fatigue     Leave dressing on - Keep it clean, dry, and intact until clinic visit     Activity as tolerated     Shower on day dressing removed (No bath)     Weight bearing as tolerated

## 2025-05-22 NOTE — H&P
[facility] - Orthopedic Periop Services  Orthopedics  H&P    Patient Name: Bruno Levy  MRN: [id number]  Admission Date: (Not on file)  Primary Care Provider: Franklyn Acosta FNP    Patient information was obtained from patient and ER records.     Subjective:     Principal Problem:Carpal tunnel syndrome of left wrist    Chief Complaint: No chief complaint on file.       HPI: She was complaint:  Wrist pain/finger numbness knee right history:Bruno Levy is a 54 y.o. male seen knee UCL was more like tibia fracture risks, of light see about his right ear but it went all the way like it was not quite to this length in his splint like it was in he had locking caps he had open a little bit right knee are in [place] like all the way down to lack right there a little triangle he would like right there gums lot mass think that if he is well he underwent lock alignment finger but that is going to be a Salter-Li 2 fracture of the growth plate right-sided with a bio open right air vessel cannula with a running full but out of everything has a fracture but and it went like Lyndsay and let me know I would like I have answered remain volar transverse of the native vessel seen over-the-counter triangle Pace it was little bit jumped right hand whatever loculated exam Salter-Li 2 fracture of the fibula was like that ligament but it went right back but he had a really go into the growth plate but they do not distress not disrupt he is not displaying outlined yet with the echo low fracture locked basilar set operating it looked as real of the fracture remain operating exam probably like backing seen fracture of the recalls that we will found like diuretic hearing gas, sulfas lot in her lag interesting that has a oblique locking knee Apley I guess it could be S launch christy asking locked distally [place] slots ligament Adaptic could you have an impact that has more thinking like if she has stepped off 1 of the safely of the  nearly used to try to step-off elbow more member a scare with the death and she would try a knee gradually warrant any gravity from the back that has dental depth perception of the knee things to often knee pain unlike what she has step-off of right under the dashboard I was thinking at [place] grab him and swing and play ambulate you generally swing a mother hand not mother being in her that is planning that has now-guard are for recheck of both wrist.  He underwent EMG/nerve conduction studies both upper extremities performed by Dr. Jayson alonso 03/24/2025.  The nerve studies were consistent with a bilateral carpal tunnel syndrome in both affecting the sensory and motor components.  He is left-hand is more symptomatic than the right.  We have discussed the option for carpal tunnel surgical release.  He had a prior bilateral carpal tunnel surgical release performed in 2012 in [place] initially with relief of symptoms.  We have discussed the option for general versus beer block anesthesia.  The potential benefits and risks of surgery outlined.  Impression:  Left carpal tunnel syndrome   Plan:  Carpal tunnel surgical release.  Outpatient general versus beer block anesthesia.  The potential benefits and risks of surgery outlined to include but not limited to bleeding, infection, damage to nerves, need for surgery from the patient is including even death.          Past Medical History:   Diagnosis Date    Depression     Diabetes mellitus, type 2     History of methicillin resistant staphylococcus aureus (MRSA)     Hyperlipidemia     Hypertension     Neuropathy     Osteomyelitis        Past Surgical History:   Procedure Laterality Date    FRACTURE SURGERY  1976    arm    LUMBAR FUSION      NASAL SEPTUM SURGERY      SPINAL CORD STIMULATOR IMPLANT      SPINAL CORD STIMULATOR REMOVAL      SPINE SURGERY  2017    TOE AMPUTATION      rt foot great to and 5th toe;lt foot great,4th and 5th toes.       Review of patient's  allergies indicates:   Allergen Reactions    Levofloxacin (bulk) Other (See Comments)     Worsened muscle spasms       No current facility-administered medications for this encounter.     Current Outpatient Medications   Medication Sig    amLODIPine (NORVASC) 5 MG tablet Take 2 tablets (10 mg total) by mouth once daily.    atorvastatin (LIPITOR) 40 MG tablet Take 1 tablet (40 mg total) by mouth once daily.    baclofen (LIORESAL) 20 MG tablet Take 20 mg by mouth every 6 (six) hours as needed.    blood sugar diagnostic Strp 1 strip by Misc.(Non-Drug; Combo Route) route once daily.    blood-glucose meter (TRUE METRIX AIR GLUCOSE METER) Misc 1 each by Other route 4 (four) times daily before meals and nightly.    diclofenac (VOLTAREN) 50 MG EC tablet Take 1 tablet (50 mg total) by mouth 2 (two) times daily.    fluticasone propionate (FLONASE) 50 mcg/actuation nasal spray 1 spray (50 mcg total) by Each Nostril route once daily.    gabapentin (NEURONTIN) 600 MG tablet 1.5 tablets every 6 hours    glimepiride (AMARYL) 2 MG tablet Take 1 tablet (2 mg total) by mouth before breakfast.    lancets (TRUEPLUS LANCETS) 33 gauge Misc 1 lancet  by Other route before meals, at bedtime and at 0200.    meclizine (ANTIVERT) 25 mg tablet Take 1 tablet (25 mg total) by mouth 3 (three) times daily as needed for Dizziness.    metFORMIN (GLUCOPHAGE-XR) 500 MG ER 24hr tablet TAKE 2 TABLETS TWICE DAILY WITH MEALS    oxyCODONE-acetaminophen (PERCOCET) 7.5-325 mg per tablet Take 1 tablet by mouth every 8 (eight) hours as needed.    traZODone (DESYREL) 100 MG tablet Take 1 tablet (100 mg total) by mouth every evening.     Family History       Problem Relation (Age of Onset)    Alcohol abuse Maternal Grandmother    Arthritis Father    Bladder Cancer Father    COPD Mother    Cancer Father    Drug abuse Brother    Hypertension Brother    Learning disabilities Daughter    Lung cancer Father          Tobacco Use    Smoking status: Former     Current  packs/day: 0.00     Average packs/day: 1 pack/day for 1 year (1.0 ttl pk-yrs)     Types: Vaping with nicotine, Cigarettes     Start date: 1/1/2021     Quit date: 2022     Years since quitting: 3.3     Passive exposure: Past    Smokeless tobacco: Never    Tobacco comments:     You can pry the vape out of my cold dead hands   Substance and Sexual Activity    Alcohol use: Not Currently     Comment: Can't afford to drink or I would    Drug use: Yes     Types: Oxycodone     Comment: Can't afford any of the good stuff    Sexual activity: Not Currently     Partners: Female     Comment: Not by choice     Review of Systems   Constitutional: Negative.     Objective:     Vital Signs (Most Recent):    Vital Signs (24h Range):              There is no height or weight on file to calculate BMI.    No intake or output data in the 24 hours ending 05/21/25 2126     General    Vitals reviewed.  Constitutional: He is oriented to person, place, and time. He appears well-developed and well-nourished.   HENT:   Head: Normocephalic and atraumatic.   Eyes: EOM are normal. Pupils are equal, round, and reactive to light.   Neck: Neck supple.   Cardiovascular:  Normal rate, regular rhythm and normal heart sounds.            Pulmonary/Chest: Effort normal and breath sounds normal.   Abdominal: Soft. Bowel sounds are normal.   Neurological: He is alert and oriented to person, place, and time.   Psychiatric: He has a normal mood and affect. His behavior is normal.             Right Hand/Wrist Exam     Other     Neuorologic Exam    Median Distribution: abnormal  Ulnar Distribution: normal  Radial Distribution: normal      Left Hand/Wrist Exam     Tests   Phalens sign: positive  Tinel's sign (median nerve): positive  Carpal Tunnel Compression Test: positive        Left Elbow Exam     Other   Sensation: normal        Vascular Exam     Right Pulses      Radial:                    2+      Capillary Refill  Right Hand: normal capillary refill            Significant Labs: All pertinent labs within the past 24 hours have been reviewed.    Significant Imaging: I have reviewed all pertinent imaging results/findings.  Assessment/Plan:     No notes have been filed under this hospital service.  Service: Orthopedic Surgery      Rock Rehman MD  Orthopedics  [facility] - Orthopedic Periop Services

## 2025-05-22 NOTE — OR NURSING
0937- Pt arrived in pacu. Pt sedated. Vss, respirations even et unlabored. Pt has dressing on left hand. Pt fingers warm to touch with good capillary refill. No signs of pain or distress. Dressing c/d/I. No signs of bleeding or drainage.     0950- Pt more awake. Vss, respirations even et unlabored. Pt denies pain at this time. Dressing c/d/I. No signs of pain or distress.    1015- Pt more awake. Responds appropriately to verbal cues. Vss, respirations even et unlabored. Pt denies pain.. Pt ready for discharge per pacu criteria. Pt transported to [facility] by stretcher.     1020- Report given to Shirlene Lyons RN. VS: 114/76, hr 75, rr 14, O2 96%. Family at bedside.

## 2025-05-22 NOTE — SUBJECTIVE & OBJECTIVE
Past Medical History:   Diagnosis Date    Depression     Diabetes mellitus, type 2     History of methicillin resistant staphylococcus aureus (MRSA)     Hyperlipidemia     Hypertension     Neuropathy     Osteomyelitis        Past Surgical History:   Procedure Laterality Date    FRACTURE SURGERY  1976    arm    LUMBAR FUSION      NASAL SEPTUM SURGERY      SPINAL CORD STIMULATOR IMPLANT      SPINAL CORD STIMULATOR REMOVAL      SPINE SURGERY  2017    TOE AMPUTATION      rt foot great to and 5th toe;lt foot great,4th and 5th toes.       Review of patient's allergies indicates:   Allergen Reactions    Levofloxacin (bulk) Other (See Comments)     Worsened muscle spasms       No current facility-administered medications for this encounter.     Current Outpatient Medications   Medication Sig    amLODIPine (NORVASC) 5 MG tablet Take 2 tablets (10 mg total) by mouth once daily.    atorvastatin (LIPITOR) 40 MG tablet Take 1 tablet (40 mg total) by mouth once daily.    baclofen (LIORESAL) 20 MG tablet Take 20 mg by mouth every 6 (six) hours as needed.    blood sugar diagnostic Strp 1 strip by Misc.(Non-Drug; Combo Route) route once daily.    blood-glucose meter (TRUE METRIX AIR GLUCOSE METER) Misc 1 each by Other route 4 (four) times daily before meals and nightly.    diclofenac (VOLTAREN) 50 MG EC tablet Take 1 tablet (50 mg total) by mouth 2 (two) times daily.    fluticasone propionate (FLONASE) 50 mcg/actuation nasal spray 1 spray (50 mcg total) by Each Nostril route once daily.    gabapentin (NEURONTIN) 600 MG tablet 1.5 tablets every 6 hours    glimepiride (AMARYL) 2 MG tablet Take 1 tablet (2 mg total) by mouth before breakfast.    lancets (TRUEPLUS LANCETS) 33 gauge Misc 1 lancet  by Other route before meals, at bedtime and at 0200.    meclizine (ANTIVERT) 25 mg tablet Take 1 tablet (25 mg total) by mouth 3 (three) times daily as needed for Dizziness.    metFORMIN (GLUCOPHAGE-XR) 500 MG ER 24hr tablet TAKE 2 TABLETS  TWICE DAILY WITH MEALS    oxyCODONE-acetaminophen (PERCOCET) 7.5-325 mg per tablet Take 1 tablet by mouth every 8 (eight) hours as needed.    traZODone (DESYREL) 100 MG tablet Take 1 tablet (100 mg total) by mouth every evening.     Family History       Problem Relation (Age of Onset)    Alcohol abuse Maternal Grandmother    Arthritis Father    Bladder Cancer Father    COPD Mother    Cancer Father    Drug abuse Brother    Hypertension Brother    Learning disabilities Daughter    Lung cancer Father          Tobacco Use    Smoking status: Former     Current packs/day: 0.00     Average packs/day: 1 pack/day for 1 year (1.0 ttl pk-yrs)     Types: Vaping with nicotine, Cigarettes     Start date: 1/1/2021     Quit date: 2022     Years since quitting: 3.3     Passive exposure: Past    Smokeless tobacco: Never    Tobacco comments:     You can pry the vape out of my cold dead hands   Substance and Sexual Activity    Alcohol use: Not Currently     Comment: Can't afford to drink or I would    Drug use: Yes     Types: Oxycodone     Comment: Can't afford any of the good stuff    Sexual activity: Not Currently     Partners: Female     Comment: Not by choice     Review of Systems   Constitutional: Negative.     Objective:     Vital Signs (Most Recent):    Vital Signs (24h Range):              There is no height or weight on file to calculate BMI.    No intake or output data in the 24 hours ending 05/21/25 2126     General    Vitals reviewed.  Constitutional: He is oriented to person, place, and time. He appears well-developed and well-nourished.   HENT:   Head: Normocephalic and atraumatic.   Eyes: EOM are normal. Pupils are equal, round, and reactive to light.   Neck: Neck supple.   Cardiovascular:  Normal rate, regular rhythm and normal heart sounds.            Pulmonary/Chest: Effort normal and breath sounds normal.   Abdominal: Soft. Bowel sounds are normal.   Neurological: He is alert and oriented to person, place, and time.    Psychiatric: He has a normal mood and affect. His behavior is normal.             Right Hand/Wrist Exam     Other     Neuorologic Exam    Median Distribution: abnormal  Ulnar Distribution: normal  Radial Distribution: normal      Left Hand/Wrist Exam     Tests   Phalens sign: positive  Tinel's sign (median nerve): positive  Carpal Tunnel Compression Test: positive        Left Elbow Exam     Other   Sensation: normal        Vascular Exam     Right Pulses      Radial:                    2+      Capillary Refill  Right Hand: normal capillary refill           Significant Labs: All pertinent labs within the past 24 hours have been reviewed.    Significant Imaging: I have reviewed all pertinent imaging results/findings.

## 2025-05-22 NOTE — DISCHARGE SUMMARY
[facility] - Orthopedic Periop Services  Discharge Note  Short Stay    Procedure(s) (LRB):  RELEASE, CARPAL TUNNEL (Left)      OUTCOME: Patient tolerated treatment/procedure well without complication and is now ready for discharge.    DISPOSITION: Home or Self Care    FINAL DIAGNOSIS:  Carpal tunnel syndrome of left wrist    FOLLOWUP: In clinic    DISCHARGE INSTRUCTIONS:    Discharge Procedure Orders   Diet general     Keep surgical extremity elevated     Ice to affected area   Order Comments: using barrier between ice and skin (specify duration&frequency)     Call MD for:  temperature >100.4     Call MD for:  persistent nausea and vomiting     Call MD for:  severe uncontrolled pain     Call MD for:  difficulty breathing, headache or visual disturbances     Call MD for:  redness, tenderness, or signs of infection (pain, swelling, redness, odor or green/yellow discharge around incision site)     Call MD for:  hives     Call MD for:  persistent dizziness or light-headedness     Call MD for:  extreme fatigue     Leave dressing on - Keep it clean, dry, and intact until clinic visit     Activity as tolerated     Shower on day dressing removed (No bath)     Weight bearing as tolerated        TIME SPENT ON DISCHARGE: 15 minutes

## 2025-05-22 NOTE — TRANSFER OF CARE
"Anesthesia Transfer of Care Note    Patient: Bruno Levy    Procedure(s) Performed: Procedure(s) (LRB):  RELEASE, CARPAL TUNNEL (Left)    Patient location: PACU    Transport from OR: Transported from OR on room air with adequate spontaneous ventilation    Post pain: adequate analgesia    Post assessment: no apparent anesthetic complications    Post vital signs: stable    Level of consciousness: responds to stimulation    Nausea/Vomiting: no nausea/vomiting    Complications: none    Transfer of care protocol was followed      Last vitals: Visit Vitals  BP 96/64 (BP Location: Right arm, Patient Position: Lying)   Pulse 70   Temp 36.4 °C (97.5 °F) (Oral)   Resp 10   Ht 5' 10" (1.778 m)   Wt 93 kg (205 lb)   SpO2 99%   BMI 29.41 kg/m²     "

## 2025-05-22 NOTE — HPI
She was complaint:  Wrist pain/finger numbness knee right history:Bruno Levy is a 54 y.o. male seen knee UCL was more like tibia fracture risks, of light see about his right ear but it went all the way like it was not quite to this length in his splint like it was in he had locking caps he had open a little bit right knee are in [place] like all the way down to lack right there a little triangle he would like right there gums lot mass think that if he is well he underwent lock alignment finger but that is going to be a Salter-Li 2 fracture of the growth plate right-sided with a bio open right air vessel cannula with a running full but out of everything has a fracture but and it went like Lyndsay and let me know I would like I have answered remain volar transverse of the native vessel seen over-the-counter triangle Pace it was little bit jumped right hand whatever loculated exam Salter-Li 2 fracture of the fibula was like that ligament but it went right back but he had a really go into the growth plate but they do not distress not disrupt he is not displaying outlined yet with the echo low fracture locked basilar set operating it looked as real of the fracture remain operating exam probably like backing seen fracture of the recalls that we will found like diuretic hearing gas, sulfas lot in her lag interesting that has a oblique locking knee Apley I guess it could be S launch christy asking locked distally [place] slots ligament Adaptic could you have an impact that has more thinking like if she has stepped off 1 of the safely of the nearly used to try to step-off elbow more member a scare with the death and she would try a knee gradually warrant any gravity from the back that has dental depth perception of the knee things to often knee pain unlike what she has step-off of right under the dashboard I was thinking at [place] grab him and swing and play ambulate you generally swing a mother hand not mother being in her  that is planning that has now-guard are for recheck of both wrist.  He underwent EMG/nerve conduction studies both upper extremities performed by Dr. Jayson alonso 03/24/2025.  The nerve studies were consistent with a bilateral carpal tunnel syndrome in both affecting the sensory and motor components.  He is left-hand is more symptomatic than the right.  We have discussed the option for carpal tunnel surgical release.  He had a prior bilateral carpal tunnel surgical release performed in 2012 in [place] initially with relief of symptoms.  We have discussed the option for general versus beer block anesthesia.  The potential benefits and risks of surgery outlined.  Impression:  Left carpal tunnel syndrome   Plan:  Carpal tunnel surgical release.  Outpatient general versus beer block anesthesia.  The potential benefits and risks of surgery outlined to include but not limited to bleeding, infection, damage to nerves, need for surgery from the patient is including even death.

## 2025-05-22 NOTE — ANESTHESIA PREPROCEDURE EVALUATION
05/22/2025  Bruno Levy is a 54 y.o., male.      Pre-op Assessment    I have reviewed the Patient Summary Reports.    I have reviewed the NPO Status.   I have reviewed the Medications.     Review of Systems         Anesthesia Plan  Type of Anesthesia, risks & benefits discussed:    Anesthesia Type: Gen ETT  Intra-op Monitoring Plan: Standard ASA Monitors  Post Op Pain Control Plan: IV/PO Opioids PRN  Induction:  IV  Informed Consent: Informed consent signed with the Patient and all parties understand the risks and agree with anesthesia plan.  All questions answered.   ASA Score: 3    Ready For Surgery From Anesthesia Perspective.     .  NPO greater than 8 hours  No anesthetic complications  Allergies      Levofloxacin (Bulk)          Hct 41    HTN  NIDDM  PVD  Depression  H/o vertigo    MP III; full beard; adequate ROM at neck

## 2025-05-28 NOTE — ANESTHESIA POSTPROCEDURE EVALUATION
Anesthesia Post Evaluation    Patient: Bruno Levy    Procedure(s) Performed: Procedure(s) (LRB):  RELEASE, CARPAL TUNNEL (Left)    Final Anesthesia Type: general      Patient location during evaluation: PACU  Post-procedure vital signs: reviewed and stable  Pain management: adequate  Airway patency: patent    PONV status at discharge: No PONV  Anesthetic complications: no      Cardiovascular status: hemodynamically stable  Respiratory status: unassisted  Hydration status: euvolemic  Follow-up not needed.              Vitals Value Taken Time   /76 05/22/25 11:00   Temp 36.4 °C (97.5 °F) 05/22/25 09:37   Pulse 75 05/22/25 11:00   Resp 16 05/22/25 11:00   SpO2 95 % 05/22/25 11:00         Event Time   Out of Recovery 10:15:00         Pain/Yehuda Score: No data recorded

## 2025-05-29 ENCOUNTER — PATIENT MESSAGE (OUTPATIENT)
Dept: FAMILY MEDICINE | Facility: CLINIC | Age: 55
End: 2025-05-29
Payer: MEDICARE

## 2025-05-30 ENCOUNTER — HOSPITAL ENCOUNTER (OUTPATIENT)
Dept: RADIOLOGY | Facility: HOSPITAL | Age: 55
Discharge: HOME | End: 2025-05-30
Payer: MEDICARE

## 2025-05-30 DIAGNOSIS — M25.511 ACUTE PAIN OF RIGHT SHOULDER: ICD-10-CM

## 2025-05-30 PROCEDURE — 73221 MRI JOINT UPR EXTREM W/O DYE: CPT | Mod: TC,RT

## 2025-05-30 PROCEDURE — 73221 MRI JOINT UPR EXTREM W/O DYE: CPT | Mod: 26,RT,, | Performed by: RADIOLOGY

## 2025-06-02 ENCOUNTER — OFFICE VISIT (OUTPATIENT)
Dept: ORTHOPEDICS | Facility: CLINIC | Age: 55
End: 2025-06-02
Payer: MEDICARE

## 2025-06-02 DIAGNOSIS — Z98.890 STATUS POST CARPAL TUNNEL RELEASE: Primary | ICD-10-CM

## 2025-06-02 PROCEDURE — 99999 PR PBB SHADOW E&M-EST. PATIENT-LVL II: CPT | Mod: PBBFAC,,,

## 2025-06-02 PROCEDURE — 99212 OFFICE O/P EST SF 10 MIN: CPT | Mod: PBBFAC

## 2025-06-03 ENCOUNTER — RESULTS FOLLOW-UP (OUTPATIENT)
Dept: FAMILY MEDICINE | Facility: CLINIC | Age: 55
End: 2025-06-03

## 2025-06-03 DIAGNOSIS — G62.9 NEUROPATHY: ICD-10-CM

## 2025-06-03 DIAGNOSIS — M51.9 LUMBAR DISC DISEASE: Primary | ICD-10-CM

## 2025-06-04 NOTE — PROGRESS NOTES
No fractures, contusions, effusions, or tears noted. Mild rotator cuff tendinosis and long head bicep tenosynovitis noted on MRI. Usually NSAIDS, rest will help this. If symptoms persist then we can refer to orthopedics if pt wants.

## 2025-06-08 ENCOUNTER — PATIENT MESSAGE (OUTPATIENT)
Dept: FAMILY MEDICINE | Facility: CLINIC | Age: 55
End: 2025-06-08
Payer: MEDICARE

## 2025-06-23 ENCOUNTER — PATIENT MESSAGE (OUTPATIENT)
Dept: FAMILY MEDICINE | Facility: CLINIC | Age: 55
End: 2025-06-23
Payer: MEDICARE

## 2025-06-25 ENCOUNTER — TELEPHONE (OUTPATIENT)
Dept: FAMILY MEDICINE | Facility: CLINIC | Age: 55
End: 2025-06-25
Payer: MEDICARE

## 2025-06-25 NOTE — TELEPHONE ENCOUNTER
Left voice mail for patient to call the clinic. Need to clarify disability status with patient, due to we have [employer] paperwork for status update. Is the patient still off from carpal tunnel surgery with Dr. Rehman( does Dr. Rehman need to fill out forms) or is patient off of work due to shoulder pain( dates patient has been off work and is he aware of PT appointment) ?

## 2025-07-09 ENCOUNTER — OFFICE VISIT (OUTPATIENT)
Dept: ORTHOPEDICS | Facility: CLINIC | Age: 55
End: 2025-07-09
Payer: MEDICARE

## 2025-07-09 VITALS — WEIGHT: 205 LBS | HEIGHT: 70 IN | BODY MASS INDEX: 29.35 KG/M2

## 2025-07-09 DIAGNOSIS — Z09 POSTOP CHECK: Primary | ICD-10-CM

## 2025-07-09 PROCEDURE — 3044F HG A1C LEVEL LT 7.0%: CPT | Mod: CPTII,,, | Performed by: ORTHOPAEDIC SURGERY

## 2025-07-09 PROCEDURE — 99999 PR PBB SHADOW E&M-EST. PATIENT-LVL III: CPT | Mod: PBBFAC,,, | Performed by: ORTHOPAEDIC SURGERY

## 2025-07-09 PROCEDURE — 1159F MED LIST DOCD IN RCRD: CPT | Mod: CPTII,,, | Performed by: ORTHOPAEDIC SURGERY

## 2025-07-09 PROCEDURE — 99213 OFFICE O/P EST LOW 20 MIN: CPT | Mod: PBBFAC | Performed by: ORTHOPAEDIC SURGERY

## 2025-07-09 PROCEDURE — 3066F NEPHROPATHY DOC TX: CPT | Mod: CPTII,,, | Performed by: ORTHOPAEDIC SURGERY

## 2025-07-09 PROCEDURE — 3061F NEG MICROALBUMINURIA REV: CPT | Mod: CPTII,,, | Performed by: ORTHOPAEDIC SURGERY

## 2025-07-09 PROCEDURE — 1160F RVW MEDS BY RX/DR IN RCRD: CPT | Mod: CPTII,,, | Performed by: ORTHOPAEDIC SURGERY

## 2025-07-09 PROCEDURE — 99024 POSTOP FOLLOW-UP VISIT: CPT | Mod: ,,, | Performed by: ORTHOPAEDIC SURGERY

## 2025-07-09 NOTE — PROGRESS NOTES
CLINIC NOTE       Chief Complaint   Patient presents with    Left Hand - Follow-up        Bruno Ureña is a 55 y.o. male seen today for recheck of his left hand.  He is now 6 weeks following left carpal tunnel surgical release.  He had excellent relief of preoperative symptoms.  He is no longer experiencing any sensory changes or pain in the hand.    Past Medical History:   Diagnosis Date    Depression     Diabetes mellitus, type 2     History of methicillin resistant staphylococcus aureus (MRSA)     Hyperlipidemia     Hypertension     Neuropathy     Osteomyelitis      Family History   Problem Relation Name Age of Onset    COPD Mother Rica Ureña     Lung cancer Father Ceferino Ureña     Bladder Cancer Father Ceferino Ureña     Cancer Father Ceferino Ureña     Arthritis Father Ceferino Ureña     Hypertension Brother Mitchell Ureña     Drug abuse Brother Mitchell Ureña     Alcohol abuse Maternal Grandmother Beronica Rosas     Learning disabilities Daughter romelia ureña      Medications Ordered Prior to Encounter[1]    ROS     There were no vitals filed for this visit.    Past Surgical History:   Procedure Laterality Date    CARPAL TUNNEL RELEASE Left 5/22/2025    Procedure: RELEASE, CARPAL TUNNEL;  Surgeon: Rock Rehman MD;  Location: [facility];  Service: Orthopedics;  Laterality: Left;    FRACTURE SURGERY  1976    arm    LUMBAR FUSION      NASAL SEPTUM SURGERY      SPINAL CORD STIMULATOR IMPLANT      SPINAL CORD STIMULATOR REMOVAL      SPINE SURGERY  2017    TOE AMPUTATION      rt foot great to and 5th toe;lt foot great,4th and 5th toes.        Review of patient's allergies indicates:   Allergen Reactions    Levofloxacin (bulk) Other (See Comments)     Worsened muscle spasms        Ortho Exam : Left hand palmar incision well healed without signs of infection.  He has full motion of the thumb and lesser fingers.  He has normal sensation to the thumb in all lesser fingers good capillary refill.    Radiographic  Examination:    Technique:    Findings:    Impression:   See Above    Assessment and Plan  Patient Active Problem List    Diagnosis Date Noted    Acute pain of right shoulder 05/14/2025    Carpal tunnel syndrome of left wrist 04/21/2025    Seasonal allergies 01/14/2025    Bilateral wrist pain 01/14/2025    Encounter for subsequent annual wellness visit (AWV) in Medicare patient 11/14/2024    BMI 29.0-29.9,adult 11/14/2024    Abnormality of gait and mobility 11/14/2024    Encounter for screening for malignant neoplasm of prostate 11/14/2024    Primary insomnia 04/22/2024    Disorder of skin and subcutaneous tissue 04/22/2024    Peripheral vascular disease, unspecified 04/22/2024    Acquired absence of other left toe(s) 04/22/2024    Vertigo 02/02/2024    Positive depression screening 02/02/2024    Encounter for hepatitis C screening test for low risk patient 02/02/2024    Rash 11/24/2023    Puncture wound of finger of right hand 09/06/2023    Puncture wound of right hand without foreign body 08/16/2023    Folliculitis 05/19/2023    Fever 05/19/2023    Non-pressure chronic ulcer of left heel and midfoot with bone involvement without evidence of necrosis 03/17/2023    Vitamin B12 deficiency neuropathy 03/17/2023    Depression, recurrent     Neuropathy     COVID 12/19/2022    Hyperlipidemia     Essential hypertension 08/08/2021    Type 2 diabetes mellitus with neurologic complication, without long-term current use of insulin 08/02/2021    Lumbar disc disease 08/02/2021    Impression:  Status post left carpal tunnel surgical release doing well   Plan:  Slow progression of activities outlined.  Vitamin E massage/application discussed.  Recheck as needed      Rock Rehman M.D.                   [1]   Current Outpatient Medications on File Prior to Visit   Medication Sig Dispense Refill    amLODIPine (NORVASC) 5 MG tablet Take 2 tablets (10 mg total) by mouth once daily. 180 tablet 1    atorvastatin (LIPITOR) 40 MG  tablet Take 1 tablet (40 mg total) by mouth once daily. 90 tablet 1    baclofen (LIORESAL) 20 MG tablet Take 20 mg by mouth every 6 (six) hours as needed.      blood sugar diagnostic Strp 1 strip by Misc.(Non-Drug; Combo Route) route once daily. 100 each 1    blood-glucose meter (TRUE METRIX AIR GLUCOSE METER) Mis 1 each by Other route 4 (four) times daily before meals and nightly. 1 each 0    diclofenac (VOLTAREN) 50 MG EC tablet Take 1 tablet (50 mg total) by mouth 2 (two) times daily. 60 tablet 0    fluticasone propionate (FLONASE) 50 mcg/actuation nasal spray 1 spray (50 mcg total) by Each Nostril route once daily. 16 g 0    gabapentin (NEURONTIN) 600 MG tablet 1.5 tablets every 6 hours      glimepiride (AMARYL) 2 MG tablet Take 1 tablet (2 mg total) by mouth before breakfast. 90 tablet 0    lancets (TRUEPLUS LANCETS) 33 gauge [facility] 1 lancet  by Other route before meals, at bedtime and at 0200. 100 each 0    meclizine (ANTIVERT) 25 mg tablet Take 1 tablet (25 mg total) by mouth 3 (three) times daily as needed for Dizziness. 90 tablet 0    metFORMIN (GLUCOPHAGE-XR) 500 MG ER 24hr tablet TAKE 2 TABLETS TWICE DAILY WITH MEALS 360 tablet 1    oxyCODONE-acetaminophen (PERCOCET) 7.5-325 mg per tablet Take 1 tablet by mouth every 8 (eight) hours as needed.      traZODone (DESYREL) 100 MG tablet Take 1 tablet (100 mg total) by mouth every evening. 90 tablet 0     No current facility-administered medications on file prior to visit.

## 2025-07-15 ENCOUNTER — OFFICE VISIT (OUTPATIENT)
Dept: FAMILY MEDICINE | Facility: CLINIC | Age: 55
End: 2025-07-15
Payer: MEDICARE

## 2025-07-15 VITALS
OXYGEN SATURATION: 98 % | HEIGHT: 70 IN | TEMPERATURE: 98 F | HEART RATE: 82 BPM | SYSTOLIC BLOOD PRESSURE: 122 MMHG | RESPIRATION RATE: 24 BRPM | WEIGHT: 203.19 LBS | DIASTOLIC BLOOD PRESSURE: 76 MMHG | BODY MASS INDEX: 29.09 KG/M2

## 2025-07-15 DIAGNOSIS — E78.2 MIXED HYPERLIPIDEMIA: ICD-10-CM

## 2025-07-15 DIAGNOSIS — F51.01 PRIMARY INSOMNIA: ICD-10-CM

## 2025-07-15 DIAGNOSIS — E11.43 TYPE 2 DIABETES MELLITUS WITH DIABETIC AUTONOMIC NEUROPATHY, WITHOUT LONG-TERM CURRENT USE OF INSULIN: Primary | ICD-10-CM

## 2025-07-15 DIAGNOSIS — J02.9 SORE THROAT: ICD-10-CM

## 2025-07-15 DIAGNOSIS — J01.00 ACUTE MAXILLARY SINUSITIS, RECURRENCE NOT SPECIFIED: ICD-10-CM

## 2025-07-15 LAB
ALBUMIN SERPL BCP-MCNC: 4.6 G/DL (ref 3.5–5)
ALBUMIN/GLOB SERPL: 1.4 {RATIO}
ALP SERPL-CCNC: 84 U/L (ref 40–150)
ALT SERPL W P-5'-P-CCNC: 30 U/L
ANION GAP SERPL CALCULATED.3IONS-SCNC: 15 MMOL/L (ref 7–16)
AST SERPL W P-5'-P-CCNC: 26 U/L (ref 11–45)
BASOPHILS # BLD AUTO: 0.09 K/UL (ref 0–0.2)
BASOPHILS NFR BLD AUTO: 1 % (ref 0–1)
BILIRUB SERPL-MCNC: 0.6 MG/DL
BUN SERPL-MCNC: 9 MG/DL (ref 8–26)
BUN/CREAT SERPL: 10 (ref 6–20)
CALCIUM SERPL-MCNC: 9.5 MG/DL (ref 8.4–10.2)
CHLORIDE SERPL-SCNC: 100 MMOL/L (ref 98–107)
CHOLEST SERPL-MCNC: 127 MG/DL
CHOLEST/HDLC SERPL: 2.8 {RATIO}
CO2 SERPL-SCNC: 31 MMOL/L (ref 22–29)
CREAT SERPL-MCNC: 0.89 MG/DL (ref 0.72–1.25)
CTP QC/QA: YES
DIFFERENTIAL METHOD BLD: ABNORMAL
EGFR (NO RACE VARIABLE) (RUSH/TITUS): 101 ML/MIN/1.73M2
EOSINOPHIL # BLD AUTO: 0.24 K/UL (ref 0–0.5)
EOSINOPHIL NFR BLD AUTO: 2.5 % (ref 1–4)
ERYTHROCYTE [DISTWIDTH] IN BLOOD BY AUTOMATED COUNT: 13.4 % (ref 11.5–14.5)
EST. AVERAGE GLUCOSE BLD GHB EST-MCNC: 134 MG/DL
GLOBULIN SER-MCNC: 3.4 G/DL (ref 2–4)
GLUCOSE SERPL-MCNC: 108 MG/DL (ref 74–100)
HBA1C MFR BLD HPLC: 6.3 %
HCT VFR BLD AUTO: 48.4 % (ref 40–54)
HDLC SERPL-MCNC: 46 MG/DL (ref 35–60)
HGB BLD-MCNC: 15.6 G/DL (ref 13.5–18)
IMM GRANULOCYTES # BLD AUTO: 0.04 K/UL (ref 0–0.04)
IMM GRANULOCYTES NFR BLD: 0.4 % (ref 0–0.4)
LDLC SERPL CALC-MCNC: 60 MG/DL
LDLC/HDLC SERPL: 1.3 {RATIO}
LYMPHOCYTES # BLD AUTO: 2.28 K/UL (ref 1–4.8)
LYMPHOCYTES NFR BLD AUTO: 24.1 % (ref 27–41)
MCH RBC QN AUTO: 31.2 PG (ref 27–31)
MCHC RBC AUTO-ENTMCNC: 32.2 G/DL (ref 32–36)
MCV RBC AUTO: 96.8 FL (ref 80–96)
MOLECULAR STREP A: NEGATIVE
MONOCYTES # BLD AUTO: 0.65 K/UL (ref 0–0.8)
MONOCYTES NFR BLD AUTO: 6.9 % (ref 2–6)
MPC BLD CALC-MCNC: 11.6 FL (ref 9.4–12.4)
NEUTROPHILS # BLD AUTO: 6.15 K/UL (ref 1.8–7.7)
NEUTROPHILS NFR BLD AUTO: 65.1 % (ref 53–65)
NONHDLC SERPL-MCNC: 81 MG/DL
NRBC # BLD AUTO: 0 X10E3/UL
NRBC, AUTO (.00): 0 %
PLATELET # BLD AUTO: 229 K/UL (ref 150–400)
POTASSIUM SERPL-SCNC: 4.8 MMOL/L (ref 3.5–5.1)
PROT SERPL-MCNC: 8 G/DL (ref 6.4–8.3)
RBC # BLD AUTO: 5 M/UL (ref 4.6–6.2)
SODIUM SERPL-SCNC: 141 MMOL/L (ref 136–145)
TRIGL SERPL-MCNC: 104 MG/DL (ref 34–140)
VLDLC SERPL-MCNC: 21 MG/DL
WBC # BLD AUTO: 9.45 K/UL (ref 4.5–11)

## 2025-07-15 PROCEDURE — 1159F MED LIST DOCD IN RCRD: CPT | Mod: ,,,

## 2025-07-15 PROCEDURE — 80061 LIPID PANEL: CPT | Mod: ,,, | Performed by: CLINICAL MEDICAL LABORATORY

## 2025-07-15 PROCEDURE — 2023F DILAT RTA XM W/O RTNOPTHY: CPT | Mod: ,,,

## 2025-07-15 PROCEDURE — 3044F HG A1C LEVEL LT 7.0%: CPT | Mod: ,,,

## 2025-07-15 PROCEDURE — 3061F NEG MICROALBUMINURIA REV: CPT | Mod: ,,,

## 2025-07-15 PROCEDURE — 3078F DIAST BP <80 MM HG: CPT | Mod: ,,,

## 2025-07-15 PROCEDURE — 3008F BODY MASS INDEX DOCD: CPT | Mod: ,,,

## 2025-07-15 PROCEDURE — 83036 HEMOGLOBIN GLYCOSYLATED A1C: CPT | Mod: ,,, | Performed by: CLINICAL MEDICAL LABORATORY

## 2025-07-15 PROCEDURE — 3074F SYST BP LT 130 MM HG: CPT | Mod: ,,,

## 2025-07-15 PROCEDURE — 80053 COMPREHEN METABOLIC PANEL: CPT | Mod: ,,, | Performed by: CLINICAL MEDICAL LABORATORY

## 2025-07-15 PROCEDURE — 87651 STREP A DNA AMP PROBE: CPT | Mod: RHCUB

## 2025-07-15 PROCEDURE — 3066F NEPHROPATHY DOC TX: CPT | Mod: ,,,

## 2025-07-15 PROCEDURE — 99214 OFFICE O/P EST MOD 30 MIN: CPT | Mod: ,,,

## 2025-07-15 PROCEDURE — 85025 COMPLETE CBC W/AUTO DIFF WBC: CPT | Mod: ,,, | Performed by: CLINICAL MEDICAL LABORATORY

## 2025-07-15 RX ORDER — GLIMEPIRIDE 2 MG/1
2 TABLET ORAL
Qty: 90 TABLET | Refills: 0 | Status: SHIPPED | OUTPATIENT
Start: 2025-07-15

## 2025-07-15 RX ORDER — TRAZODONE HYDROCHLORIDE 100 MG/1
100 TABLET ORAL NIGHTLY
Qty: 90 TABLET | Refills: 1 | Status: SHIPPED | OUTPATIENT
Start: 2025-07-15

## 2025-07-15 RX ORDER — AZITHROMYCIN 250 MG/1
TABLET, FILM COATED ORAL
Qty: 6 TABLET | Refills: 0 | Status: SHIPPED | OUTPATIENT
Start: 2025-07-15 | End: 2025-07-20

## 2025-07-15 NOTE — PROGRESS NOTES
BEATRICE PARKS, HAYLEY   [facility]  [address]  [place]  [address]        PATIENT NAME: Bruno Levy  : 1970  DATE: 7/15/25  MRN: [id number]        Reason for Visit / Chief Complaint: Follow-up (Patient is a 55 year old male who presents to the clinic related to 3 month follow up.  Patient has had his labwork.  Patient reports carpel tunnel surgery on left hand Dr. Hinojosa on 2025, doing well.  ), Sore Throat (Sore throat since Saturday, hard to swallow since last night.  ), Hypertension, and Health Maintenance (HIV Screening Never done/Colorectal Cancer Screening Never done/Pneumococcal Vaccines (Age 50+)(2 of 2 - PCV) due on 2016/Shingles Vaccine(1 of 2) Never done/COVID-19 Vaccine( -  season) Never done//Patient declines all care gaps at current time. /)       Update PCP  Update Chief Complaint         History of Present Illness / Problem Focused Workflow     Bruno Levy presents to the clinic with Follow-up (Patient is a 55 year old male who presents to the clinic related to 3 month follow up.  Patient has had his labwork.  Patient reports carpel tunnel surgery on left hand Dr. Hinojosa on 2025, doing well.  ), Sore Throat (Sore throat since Saturday, hard to swallow since last night.  ), Hypertension, and Health Maintenance (HIV Screening Never done/Colorectal Cancer Screening Never done/Pneumococcal Vaccines (Age 50+)(2 of 2 - PCV) due on 2016/Shingles Vaccine(1 of 2) Never done/COVID-19 Vaccine( -  season) Never done//Patient declines all care gaps at current time. /)     56 y/o male presents to clinic for 3 month follow up. States he has been doing well. He reports he did have left carpal tunnel surgery in May 2025 with good relief. Doing well from surgery. He denies any chest pain, SOB, palpitations, HA, vision changes. He is complaining of sore throat and sinus pressure/congestion that started last week. Reports symptoms worsened  since Saturday.  He has not taken any OTC medication for symptom relief.         Review of Systems     Review of Systems   Constitutional:  Negative for chills, fatigue and fever.   HENT:  Positive for sinus pressure/congestion and sore throat. Negative for nasal congestion, ear discharge, ear pain and rhinorrhea.    Respiratory:  Negative for cough, chest tightness, shortness of breath, wheezing and stridor.    Cardiovascular:  Negative for palpitations and claudication.   Gastrointestinal:  Negative for abdominal pain, constipation, diarrhea, nausea, vomiting and reflux.   Genitourinary:  Negative for dysuria, flank pain, frequency, hematuria and urgency.   Musculoskeletal:  Negative for myalgias.   Neurological:  Negative for dizziness, weakness, light-headedness and headaches.   Psychiatric/Behavioral:  Negative for suicidal ideas.         Medical / Social / Family History     Past Medical History:   Diagnosis Date    Depression     Diabetes mellitus, type 2     History of methicillin resistant staphylococcus aureus (MRSA)     Hyperlipidemia     Hypertension     Neuropathy     Osteomyelitis        Past Surgical History:   Procedure Laterality Date    CARPAL TUNNEL RELEASE Left 5/22/2025    Procedure: RELEASE, CARPAL TUNNEL;  Surgeon: Rock Rehman MD;  Location: [facility];  Service: Orthopedics;  Laterality: Left;    FRACTURE SURGERY  1976    arm    LUMBAR FUSION      NASAL SEPTUM SURGERY      SPINAL CORD STIMULATOR IMPLANT      SPINAL CORD STIMULATOR REMOVAL      SPINE SURGERY  2017    TOE AMPUTATION      rt foot great to and 5th toe;lt foot great,4th and 5th toes.       Social History    reports that he quit smoking about 3 years ago. His smoking use included vaping with nicotine and cigarettes. He started smoking about 4 years ago. He has a 1 pack-year smoking history. He has been exposed to tobacco smoke. He has never used smokeless tobacco. He reports that he does not currently use  alcohol. He reports current drug use. Drug: Oxycodone.    Family History  's family history includes Alcohol abuse in his maternal grandmother; Arthritis in his father; Bladder Cancer in his father; COPD in his mother; Cancer in his father; Drug abuse in his brother; Hypertension in his brother; Learning disabilities in his daughter; Lung cancer in his father.    Medications and Allergies     Medications  Outpatient Medications Marked as Taking for the 7/15/25 encounter (Office Visit) with Franklyn Acosta FNP   Medication Sig Dispense Refill    amLODIPine (NORVASC) 5 MG tablet Take 2 tablets (10 mg total) by mouth once daily. 180 tablet 1    atorvastatin (LIPITOR) 40 MG tablet Take 1 tablet (40 mg total) by mouth once daily. 90 tablet 1    baclofen (LIORESAL) 20 MG tablet Take 20 mg by mouth every 6 (six) hours as needed.      blood sugar diagnostic Strp 1 strip by Misc.(Non-Drug; Combo Route) route once daily. 100 each 1    blood-glucose meter (TRUE METRIX AIR GLUCOSE METER) Misc 1 each by Other route 4 (four) times daily before meals and nightly. 1 each 0    fluticasone propionate (FLONASE) 50 mcg/actuation nasal spray 1 spray (50 mcg total) by Each Nostril route once daily. 16 g 0    gabapentin (NEURONTIN) 600 MG tablet 1.5 tablets every 6 hours      lancets (TRUEPLUS LANCETS) 33 gauge Misc 1 lancet  by Other route before meals, at bedtime and at 0200. 100 each 0    meclizine (ANTIVERT) 25 mg tablet Take 1 tablet (25 mg total) by mouth 3 (three) times daily as needed for Dizziness. (Patient taking differently: Take 25 mg by mouth 3 (three) times daily as needed for Dizziness. Taking 1 tablet in the morning) 90 tablet 0    metFORMIN (GLUCOPHAGE-XR) 500 MG ER 24hr tablet TAKE 2 TABLETS TWICE DAILY WITH MEALS 360 tablet 1    oxyCODONE-acetaminophen (PERCOCET) 7.5-325 mg per tablet Take 1 tablet by mouth every 8 (eight) hours as needed.      [DISCONTINUED] glimepiride (AMARYL) 2 MG tablet Take 1 tablet (2 mg  "total) by mouth before breakfast. 90 tablet 0    [DISCONTINUED] traZODone (DESYREL) 100 MG tablet Take 1 tablet (100 mg total) by mouth every evening. 90 tablet 0       Allergies  Review of patient's allergies indicates:   Allergen Reactions    Levofloxacin (bulk) Other (See Comments)     Worsened muscle spasms       Physical Examination   /76 (BP Location: Right arm, Patient Position: Sitting)   Pulse 82   Temp 97.6 °F (36.4 °C) (Oral)   Resp (!) 24   Ht 5' 10" (1.778 m)   Wt 92.2 kg (203 lb 3.2 oz)   SpO2 98%   BMI 29.16 kg/m²    Physical Exam  Vitals and nursing note reviewed.   Constitutional:       General: He is awake.      Appearance: Normal appearance.   HENT:      Head: Normocephalic.      Right Ear: Ear canal and external ear normal. A middle ear effusion is present.      Left Ear: Tympanic membrane, ear canal and external ear normal.      Nose: Congestion present.      Right Turbinates: Pale.      Left Turbinates: Pale.      Left Sinus: Maxillary sinus tenderness present.      Mouth/Throat:      Lips: Pink.      Mouth: Mucous membranes are moist.      Pharynx: Oropharynx is clear. Uvula midline. Posterior oropharyngeal erythema present.   Cardiovascular:      Rate and Rhythm: Normal rate and regular rhythm.      Heart sounds: Normal heart sounds, S1 normal and S2 normal. Heart sounds not distant. No murmur heard.     No friction rub. No gallop. No S3 or S4 sounds.   Pulmonary:      Effort: Pulmonary effort is normal. No respiratory distress.      Breath sounds: Normal breath sounds. No decreased breath sounds, wheezing, rhonchi or rales.   Abdominal:      General: Bowel sounds are normal.      Palpations: Abdomen is soft.      Tenderness: There is no abdominal tenderness.   Musculoskeletal:      Cervical back: Normal range of motion.      Right lower leg: No edema.      Left lower leg: No edema.   Skin:     General: Skin is warm.      Capillary Refill: Capillary refill takes less than 2 " "seconds.   Neurological:      Mental Status: He is alert and oriented to person, place, and time.   Psychiatric:         Thought Content: Thought content does not include homicidal or suicidal ideation. Thought content does not include homicidal or suicidal plan.          Assessment and Plan (including Health Maintenance)      Problem List  Smart Sets  Document Outside HM   :    Plan: Lab work today and will call with results  Trazodone, Amaryl refilled today   Azithromycin RX. RTC with worsening, new or persistent symptoms  Follow up 3 months          Problem List Items Addressed This Visit       Type 2 diabetes mellitus with neurologic complication, without long-term current use of insulin - Primary    Current Assessment & Plan   Lab Results   Component Value Date    HGBA1C 6.6 04/14/2025    A1C below 7. Home fasting blood sugars running 100-110. Amaryl refilled today.  Goal is less than 7. Continue current meds and low carb/no concentrated sweets diet with increased exercise as tolerated. Will follow up with lab results. Patient to follow up in 3 months or as needed.          Relevant Medications    glimepiride (AMARYL) 2 MG tablet    Other Relevant Orders    CBC Auto Differential    Comprehensive Metabolic Panel    Hemoglobin A1C    Hyperlipidemia    Current Assessment & Plan   Lab Results   Component Value Date    CHOL 114 04/14/2025    CHOL 117 01/14/2025    CHOL 121 07/11/2024      Lab Results   Component Value Date    HDL 39 04/14/2025    HDL 35 01/14/2025    HDL 31 (L) 07/11/2024     Lab Results   Component Value Date    LDLCALC 59 04/14/2025    LDLCALC 55 01/14/2025    LDLCALC 53 07/11/2024      Lab Results   Component Value Date    TRIG 81 04/14/2025    TRIG 134 01/14/2025    TRIG 187 (H) 07/11/2024     No results found for: "TOTALCHOLEST"  Lab Results   Component Value Date    NONHDLCHOL 75 04/14/2025    NONHDLCHOL 82 01/14/2025    NONHDLCHOL 90 07/11/2024     Lab Results   Component Value Date    " "CHOLHDL 2.9 04/14/2025    CHOLHDL 3.3 01/14/2025    CHOLHDL 3.9 07/11/2024    Goal LDL is less than 70. Continue current meds and low fat/low cholesterol diet with increased exercise as tolerated. Will follow up with labs. Patient to follow up in 3 months or as needed    A few changes in your diet can reduce cholesterol and improve your heart health. Saturated fats, found primarily in red meat and full-fat dairy products, raise your total cholesterol. Decreasing your consumption of saturated fats can reduce your low-density lipoprotein (LDL) cholesterol. rans fats, sometimes listed on food labels as "partially hydrogenated vegetable oil," are often used in margarines and store-bought cookies, crackers and cakes.     Trans fats raise overall cholesterol levels. Omega-3 fatty acids don't affect LDL cholesterol. But they have other heart-healthy benefits, including reducing blood pressure. Foods with omega-3 fatty acids include salmon, mackerel, herring, walnuts and flaxseeds.Soluble fiber can reduce the absorption of cholesterol into your bloodstream. Soluble fiber is found in such foods as oatmeal, kidney beans, [place] sprouts, apples and pears.  at least 30 minutes of exercise five times a week or vigorous aerobic activity for 20 minutes three times a week if tolerated.           Relevant Orders    Lipid Panel    Primary insomnia    Current Assessment & Plan   Hx of insomnia. Symptoms controlled well at present. Trazodone refilled today. Follow up 3 months.      Reviewed treatment plan and medication side effects/risk/benefits/directions on taking medications. Patient was instructed to take medication on a night that they do not have to get up and drive, go to work, etc. to monitor for morning drowsiness.. Patient verbalized understanding of treatment plan and denies any questions.         Relevant Medications    traZODone (DESYREL) 100 MG tablet    Acute maxillary sinusitis    Current Assessment & Plan   Presents " with sore throat and sinus pressure that started last week. Right middle ear effusion, mild erythema noted right TM, TTP maxillary sinus, erythematous oropharynx.   Rapid strep in clinic negative  Azithromycin Rx.  Reviewed pathology of current symptoms, medication side effects/risk/benefits/directions on taking medications, and worsening or persistent symptoms that require follow up in next 2-3 days. Saline/steroid nasal sprays, antihistamine use, increase fluid intake, and multivitamin/elderberry/Zinc use were recommended. May take Tylenol or Motrin as needed for pain and/or fever. Patient was instructed to take antibiotic as directed, complete entire course to avoid antibiotic resistance, and take OTC probiotic with antibiotic to prevent GI upset. Patient verbalized understanding of treatment plan and denies any questions.           Relevant Medications    azithromycin (Z-NUVIA) 250 MG tablet     Other Visit Diagnoses         Sore throat        Relevant Orders    POCT Strep A, Molecular (Completed)                Future Appointments   Date Time Provider Department Center   7/18/2025  8:00 AM Marta Garcia, PT, DPT [facility]   11/13/2025  1:00 PM AWV NURSE RO[facility]            Signature:      [facility]  [address] [address] [place]  [address]       Date of encounter: 7/15/25

## 2025-07-15 NOTE — ASSESSMENT & PLAN NOTE
Lab Results   Component Value Date    HGBA1C 6.6 04/14/2025    A1C below 7. Home fasting blood sugars running 100-110. Amaryl refilled today.  Goal is less than 7. Continue current meds and low carb/no concentrated sweets diet with increased exercise as tolerated. Will follow up with lab results. Patient to follow up in 3 months or as needed.

## 2025-07-15 NOTE — ASSESSMENT & PLAN NOTE
"Lab Results   Component Value Date    CHOL 114 04/14/2025    CHOL 117 01/14/2025    CHOL 121 07/11/2024      Lab Results   Component Value Date    HDL 39 04/14/2025    HDL 35 01/14/2025    HDL 31 (L) 07/11/2024     Lab Results   Component Value Date    LDLCALC 59 04/14/2025    LDLCALC 55 01/14/2025    LDLCALC 53 07/11/2024      Lab Results   Component Value Date    TRIG 81 04/14/2025    TRIG 134 01/14/2025    TRIG 187 (H) 07/11/2024     No results found for: "TOTALCHOLEST"  Lab Results   Component Value Date    NONHDLCHOL 75 04/14/2025    NONHDLCHOL 82 01/14/2025    NONHDLCHOL 90 07/11/2024     Lab Results   Component Value Date    CHOLHDL 2.9 04/14/2025    CHOLHDL 3.3 01/14/2025    CHOLHDL 3.9 07/11/2024    Goal LDL is less than 70. Continue current meds and low fat/low cholesterol diet with increased exercise as tolerated. Will follow up with labs. Patient to follow up in 3 months or as needed    A few changes in your diet can reduce cholesterol and improve your heart health. Saturated fats, found primarily in red meat and full-fat dairy products, raise your total cholesterol. Decreasing your consumption of saturated fats can reduce your low-density lipoprotein (LDL) cholesterol. rans fats, sometimes listed on food labels as "partially hydrogenated vegetable oil," are often used in margarines and store-bought cookies, crackers and cakes.     Trans fats raise overall cholesterol levels. Omega-3 fatty acids don't affect LDL cholesterol. But they have other heart-healthy benefits, including reducing blood pressure. Foods with omega-3 fatty acids include salmon, mackerel, herring, walnuts and flaxseeds.Soluble fiber can reduce the absorption of cholesterol into your bloodstream. Soluble fiber is found in such foods as oatmeal, kidney beans, [place] sprouts, apples and pears.  at least 30 minutes of exercise five times a week or vigorous aerobic activity for 20 minutes three times a week if tolerated.    "

## 2025-07-15 NOTE — ASSESSMENT & PLAN NOTE
Hx of insomnia. Symptoms controlled well at present. Trazodone refilled today. Follow up 3 months.      Reviewed treatment plan and medication side effects/risk/benefits/directions on taking medications. Patient was instructed to take medication on a night that they do not have to get up and drive, go to work, etc. to monitor for morning drowsiness.. Patient verbalized understanding of treatment plan and denies any questions.

## 2025-07-15 NOTE — ASSESSMENT & PLAN NOTE
Presents with sore throat and sinus pressure that started last week. Right middle ear effusion, mild erythema noted right TM, TTP maxillary sinus, erythematous oropharynx.   Rapid strep in clinic negative  Azithromycin Rx.  Reviewed pathology of current symptoms, medication side effects/risk/benefits/directions on taking medications, and worsening or persistent symptoms that require follow up in next 2-3 days. Saline/steroid nasal sprays, antihistamine use, increase fluid intake, and multivitamin/elderberry/Zinc use were recommended. May take Tylenol or Motrin as needed for pain and/or fever. Patient was instructed to take antibiotic as directed, complete entire course to avoid antibiotic resistance, and take OTC probiotic with antibiotic to prevent GI upset. Patient verbalized understanding of treatment plan and denies any questions.

## 2025-07-17 NOTE — TELEPHONE ENCOUNTER
I spoke with patient on 06/25/2025. He states he is aware of PT appointment. He has to have evaluation for disability every 1-2 years through his employer([employer]) to continue benefits. He will keep PT evaluation.Provider will review information and [facility] paperwork once PT evaluation is completed. His appointment is 07/23/2025.

## 2025-07-23 ENCOUNTER — CLINICAL SUPPORT (OUTPATIENT)
Dept: REHABILITATION | Facility: HOSPITAL | Age: 55
End: 2025-07-23
Payer: MEDICARE

## 2025-07-23 DIAGNOSIS — G62.9 NEUROPATHY: ICD-10-CM

## 2025-07-23 DIAGNOSIS — M51.9 LUMBAR DISC DISEASE: Primary | ICD-10-CM

## 2025-07-23 PROCEDURE — 97750 PHYSICAL PERFORMANCE TEST: CPT

## 2025-07-24 NOTE — PROGRESS NOTES
Client has completed FCE. Please see media for scanned FCE evaluation.   Thank you,   Preeti ESPINALT, MTC

## 2025-07-28 ENCOUNTER — PATIENT MESSAGE (OUTPATIENT)
Dept: FAMILY MEDICINE | Facility: CLINIC | Age: 55
End: 2025-07-28
Payer: MEDICARE

## 2025-07-28 DIAGNOSIS — R42 VERTIGO: ICD-10-CM

## 2025-07-28 RX ORDER — MECLIZINE HYDROCHLORIDE 25 MG/1
25 TABLET ORAL 3 TIMES DAILY PRN
Qty: 90 TABLET | Refills: 2 | Status: SHIPPED | OUTPATIENT
Start: 2025-07-28 | End: 2025-07-31

## 2025-07-31 ENCOUNTER — OFFICE VISIT (OUTPATIENT)
Dept: FAMILY MEDICINE | Facility: CLINIC | Age: 55
End: 2025-07-31
Payer: MEDICARE

## 2025-07-31 VITALS
TEMPERATURE: 98 F | HEART RATE: 84 BPM | BODY MASS INDEX: 29.35 KG/M2 | RESPIRATION RATE: 17 BRPM | SYSTOLIC BLOOD PRESSURE: 106 MMHG | DIASTOLIC BLOOD PRESSURE: 64 MMHG | OXYGEN SATURATION: 95 % | HEIGHT: 70 IN | WEIGHT: 205 LBS

## 2025-07-31 DIAGNOSIS — R42 VERTIGO: Primary | ICD-10-CM

## 2025-07-31 DIAGNOSIS — L03.90 CELLULITIS, UNSPECIFIED CELLULITIS SITE: ICD-10-CM

## 2025-07-31 PROCEDURE — 3074F SYST BP LT 130 MM HG: CPT | Mod: ,,,

## 2025-07-31 PROCEDURE — 3044F HG A1C LEVEL LT 7.0%: CPT | Mod: ,,,

## 2025-07-31 PROCEDURE — 3066F NEPHROPATHY DOC TX: CPT | Mod: ,,,

## 2025-07-31 PROCEDURE — 3008F BODY MASS INDEX DOCD: CPT | Mod: ,,,

## 2025-07-31 PROCEDURE — 1160F RVW MEDS BY RX/DR IN RCRD: CPT | Mod: ,,,

## 2025-07-31 PROCEDURE — 3061F NEG MICROALBUMINURIA REV: CPT | Mod: ,,,

## 2025-07-31 PROCEDURE — 99213 OFFICE O/P EST LOW 20 MIN: CPT | Mod: ,,,

## 2025-07-31 PROCEDURE — 2023F DILAT RTA XM W/O RTNOPTHY: CPT | Mod: ,,,

## 2025-07-31 PROCEDURE — 1159F MED LIST DOCD IN RCRD: CPT | Mod: ,,,

## 2025-07-31 PROCEDURE — 3078F DIAST BP <80 MM HG: CPT | Mod: ,,,

## 2025-07-31 RX ORDER — MECLIZINE HYDROCHLORIDE 50 MG/1
50 TABLET ORAL 2 TIMES DAILY PRN
Qty: 60 TABLET | Refills: 0 | Status: SHIPPED | OUTPATIENT
Start: 2025-07-31

## 2025-07-31 RX ORDER — SULFAMETHOXAZOLE AND TRIMETHOPRIM 800; 160 MG/1; MG/1
1 TABLET ORAL 2 TIMES DAILY
Qty: 20 TABLET | Refills: 0 | Status: SHIPPED | OUTPATIENT
Start: 2025-07-31 | End: 2025-08-10

## 2025-07-31 NOTE — ASSESSMENT & PLAN NOTE
Left foot with erythema and mild edema.   He has hx poor wound healing and amputations due to DM  Will start Bactrim today  Follow up 1 week for evaluation

## 2025-07-31 NOTE — ASSESSMENT & PLAN NOTE
Reports continued dizziness throughout the day with position changes and head movement.   Meclizine helping some  BP at home ranging 120-130/80's  Bilateral ear mild effusion otherwise normal.  Will refer to ENT for further evaluation  Meclizine refilled today

## 2025-07-31 NOTE — PROGRESS NOTES
"   BEATRICE PARKS, SARABJIT   [facility]  [address]  [place]  [address]        PATIENT NAME: Bruno Levy  : 1970  DATE: 25  MRN: [id number]        Reason for Visit / Chief Complaint: Dizziness (Pt is a 54 yo male who presents to the clinic c/o dizziness upon getting up in the morning. States that it also occurs when he moves his head too quickly or if he tilts his head back. Pt states that his BP has been "normal" at home. ) and Physical Exam (Pt also needs disability assessment paperwork completed for his employer. He has completed the physical therapy evaluation as required. )       Update PCP  Update Chief Complaint         History of Present Illness / Problem Focused Workflow     Bruno Levy presents to the clinic with Dizziness (Pt is a 54 yo male who presents to the clinic c/o dizziness upon getting up in the morning. States that it also occurs when he moves his head too quickly or if he tilts his head back. Pt states that his BP has been "normal" at home. ) and Physical Exam (Pt also needs disability assessment paperwork completed for his employer. He has completed the physical therapy evaluation as required. )     56 y/o male presents to clinic with complaints of continued dizziness. States meclizine helps some. Pt states it occurs mostly before lunch and when he moves his head too quickly. Pt states BP has been running 120-130/80's at home.     He also states he noticed left , edema and redness this weekend. States he has been resting it and elevating it with some relief. Pt has hx of amputations for poor wound healing from diabetes    Review of Systems     Review of Systems   Constitutional:  Negative for chills, fatigue and fever.   HENT:  Negative for nasal congestion, ear discharge, ear pain, rhinorrhea, sinus pressure/congestion and sore throat.    Respiratory:  Negative for cough, chest tightness, shortness of breath, wheezing and stridor.    Cardiovascular:  " Negative for palpitations and claudication.   Gastrointestinal:  Negative for abdominal pain, constipation, diarrhea, nausea, vomiting and reflux.   Genitourinary:  Negative for dysuria, flank pain, frequency, hematuria and urgency.   Musculoskeletal:  Negative for myalgias.   Integumentary:         Redness/edema left foot   Neurological:  Positive for dizziness. Negative for weakness, light-headedness and headaches.   Psychiatric/Behavioral:  Negative for suicidal ideas.         Medical / Social / Family History     Past Medical History:   Diagnosis Date    Depression     Diabetes mellitus, type 2     History of methicillin resistant staphylococcus aureus (MRSA)     Hyperlipidemia     Hypertension     Neuropathy     Osteomyelitis        Past Surgical History:   Procedure Laterality Date    CARPAL TUNNEL RELEASE Left 5/22/2025    Procedure: RELEASE, CARPAL TUNNEL;  Surgeon: Rock Rehman MD;  Location: [facility];  Service: Orthopedics;  Laterality: Left;    FRACTURE SURGERY  1976    arm    LUMBAR FUSION      NASAL SEPTUM SURGERY      SPINAL CORD STIMULATOR IMPLANT      SPINAL CORD STIMULATOR REMOVAL      SPINE SURGERY  2017    TOE AMPUTATION      rt foot great to and 5th toe;lt foot great,4th and 5th toes.       Social History    reports that he quit smoking about 3 years ago. His smoking use included vaping with nicotine and cigarettes. He started smoking about 4 years ago. He has a 1 pack-year smoking history. He has been exposed to tobacco smoke. He has never used smokeless tobacco. He reports that he does not currently use alcohol. He reports current drug use. Drug: Oxycodone.    Family History  's family history includes Alcohol abuse in his maternal grandmother; Arthritis in his father; Bladder Cancer in his father; COPD in his mother; Cancer in his father; Drug abuse in his brother; Hypertension in his brother; Learning disabilities in his daughter; Lung cancer in his  "father.    Medications and Allergies     Medications  Outpatient Medications Marked as Taking for the 7/31/25 encounter (Office Visit) with Franklyn Acosta FNP   Medication Sig Dispense Refill    amLODIPine (NORVASC) 5 MG tablet Take 2 tablets (10 mg total) by mouth once daily. 180 tablet 1    atorvastatin (LIPITOR) 40 MG tablet Take 1 tablet (40 mg total) by mouth once daily. 90 tablet 1    baclofen (LIORESAL) 20 MG tablet Take 20 mg by mouth every 6 (six) hours as needed.      blood sugar diagnostic Strp 1 strip by Misc.(Non-Drug; Combo Route) route once daily. 100 each 1    blood-glucose meter (TRUE METRIX AIR GLUCOSE METER) Misc 1 each by Other route 4 (four) times daily before meals and nightly. 1 each 0    fluticasone propionate (FLONASE) 50 mcg/actuation nasal spray 1 spray (50 mcg total) by Each Nostril route once daily. 16 g 0    gabapentin (NEURONTIN) 600 MG tablet 1.5 tablets every 6 hours      glimepiride (AMARYL) 2 MG tablet Take 1 tablet (2 mg total) by mouth before breakfast. 90 tablet 0    lancets (TRUEPLUS LANCETS) 33 gauge Misc 1 lancet  by Other route before meals, at bedtime and at 0200. 100 each 0    metFORMIN (GLUCOPHAGE-XR) 500 MG ER 24hr tablet TAKE 2 TABLETS TWICE DAILY WITH MEALS 360 tablet 1    oxyCODONE-acetaminophen (PERCOCET) 7.5-325 mg per tablet Take 1 tablet by mouth every 8 (eight) hours as needed.      traZODone (DESYREL) 100 MG tablet Take 1 tablet (100 mg total) by mouth every evening. 90 tablet 1    [DISCONTINUED] meclizine (ANTIVERT) 25 mg tablet Take 1 tablet (25 mg total) by mouth 3 (three) times daily as needed for Dizziness. 90 tablet 2       Allergies  Review of patient's allergies indicates:   Allergen Reactions    Levofloxacin (bulk) Other (See Comments)     Worsened muscle spasms       Physical Examination   /64 (BP Location: Right arm, Patient Position: Sitting)   Pulse 84   Temp 98.1 °F (36.7 °C) (Oral)   Resp 17   Ht 5' 10" (1.778 m)   Wt 93 kg (205 lb)  "  SpO2 95%   BMI 29.41 kg/m²    Physical Exam  Vitals and nursing note reviewed.   Constitutional:       General: He is awake.      Appearance: Normal appearance.   HENT:      Head: Normocephalic.      Comments: Mild bilateral middle ear effusions. TM normal     Right Ear: Hearing, ear canal and external ear normal. A middle ear effusion is present.      Left Ear: Hearing, ear canal and external ear normal. A middle ear effusion is present.      Nose: Nose normal.      Mouth/Throat:      Lips: Pink.      Mouth: Mucous membranes are moist.      Pharynx: Oropharynx is clear. Uvula midline.   Eyes:      Extraocular Movements: Extraocular movements intact.      Conjunctiva/sclera: Conjunctivae normal.   Cardiovascular:      Rate and Rhythm: Normal rate and regular rhythm.      Pulses:           Dorsalis pedis pulses are 1+ on the left side.      Heart sounds: Normal heart sounds, S1 normal and S2 normal. Heart sounds not distant. No murmur heard.     No friction rub. No gallop. No S3 or S4 sounds.   Pulmonary:      Effort: Pulmonary effort is normal. No respiratory distress.      Breath sounds: Normal breath sounds. No decreased breath sounds, wheezing, rhonchi or rales.   Abdominal:      General: Bowel sounds are normal.      Palpations: Abdomen is soft.      Tenderness: There is no abdominal tenderness.   Musculoskeletal:      Cervical back: Normal range of motion.      Right lower leg: No edema.      Left lower leg: No edema.        Feet:    Feet:      Left foot:      Skin integrity: Erythema and warmth present.   Skin:     General: Skin is warm.      Capillary Refill: Capillary refill takes less than 2 seconds.   Neurological:      Mental Status: He is alert and oriented to person, place, and time.   Psychiatric:         Thought Content: Thought content does not include homicidal or suicidal ideation. Thought content does not include homicidal or suicidal plan.          Assessment and Plan (including Health  Maintenance)      Problem List  Smart Sets  Document Outside HM     Problem List Items Addressed This Visit       Vertigo - Primary    Current Assessment & Plan   Reports continued dizziness throughout the day with position changes and head movement.   Meclizine helping some  BP at home ranging 120-130/80's  Bilateral ear mild effusion otherwise normal.  Will refer to ENT for further evaluation  Meclizine refilled today         Relevant Medications    meclizine (ANTIVERT) 50 MG tablet    Other Relevant Orders    Ambulatory referral/consult to ENT    Cellulitis    Current Assessment & Plan   Left foot with erythema and mild edema.   He has hx poor wound healing and amputations due to DM  Will start Bactrim today  Follow up 1 week for evaluation         Relevant Medications    sulfamethoxazole-trimethoprim 800-160mg (BACTRIM DS) 800-160 mg Tab         Future Appointments   Date Time Provider Department Center   11/13/2025  1:00 PM [facility] NURSE [facility]            Signature:      [facility]  [address] [place]  [address]       Date of encounter: 7/31/25

## 2025-08-05 ENCOUNTER — OFFICE VISIT (OUTPATIENT)
Dept: OTOLARYNGOLOGY | Facility: CLINIC | Age: 55
End: 2025-08-05
Payer: MEDICARE

## 2025-08-05 VITALS — BODY MASS INDEX: 29.35 KG/M2 | HEIGHT: 70 IN | WEIGHT: 205 LBS

## 2025-08-05 DIAGNOSIS — R42 VERTIGO: ICD-10-CM

## 2025-08-05 DIAGNOSIS — R42 DIZZINESS AND GIDDINESS: ICD-10-CM

## 2025-08-05 DIAGNOSIS — H90.3 SENSORINEURAL HEARING LOSS (SNHL) OF BOTH EARS: Primary | ICD-10-CM

## 2025-08-05 DIAGNOSIS — H93.19 TINNITUS, UNSPECIFIED LATERALITY: ICD-10-CM

## 2025-08-05 PROCEDURE — 3066F NEPHROPATHY DOC TX: CPT | Mod: CPTII,,, | Performed by: OTOLARYNGOLOGY

## 2025-08-05 PROCEDURE — 3061F NEG MICROALBUMINURIA REV: CPT | Mod: CPTII,,, | Performed by: OTOLARYNGOLOGY

## 2025-08-05 PROCEDURE — 3044F HG A1C LEVEL LT 7.0%: CPT | Mod: CPTII,,, | Performed by: OTOLARYNGOLOGY

## 2025-08-05 PROCEDURE — 99214 OFFICE O/P EST MOD 30 MIN: CPT | Mod: PBBFAC | Performed by: OTOLARYNGOLOGY

## 2025-08-05 PROCEDURE — 99999 PR PBB SHADOW E&M-EST. PATIENT-LVL IV: CPT | Mod: PBBFAC,,, | Performed by: OTOLARYNGOLOGY

## 2025-08-05 PROCEDURE — 1159F MED LIST DOCD IN RCRD: CPT | Mod: CPTII,,, | Performed by: OTOLARYNGOLOGY

## 2025-08-05 PROCEDURE — 3008F BODY MASS INDEX DOCD: CPT | Mod: CPTII,,, | Performed by: OTOLARYNGOLOGY

## 2025-08-05 PROCEDURE — 99214 OFFICE O/P EST MOD 30 MIN: CPT | Mod: S$PBB,,, | Performed by: OTOLARYNGOLOGY

## 2025-08-05 NOTE — PROGRESS NOTES
Subjective:       Patient ID: Bruno Levy is a 55 y.o. male.    Chief Complaint: No chief complaint on file.    HPI  Review of Systems   HENT:  Positive for hearing loss.    Neurological:  Positive for dizziness and light-headedness.   All other systems reviewed and are negative.      Objective:      Physical Exam  General: NAD  Head: Normocephalic, atraumatic, no facial asymmetry/normal strength,  Ears: Both auricules normal in appearance, w/o deformities tympanic membranes normal external auditory canals normal  Nose: External nose w/o deformities normal turbinates no drainage or inflammation  Oral Cavity: Lips, gums, floor of mouth, tongue hard palate, and buccal mucosa without mass/lesion  Oropharynx: Mucosa pink and moist, soft palate, posterior pharynx and oropharyngeal wall without mass/lesion  Neck: Supple, symmetric, trachea midline, no palpable mass/lesion, no palpable cervical lymphadenopathy  Skin: Warm and dry, no concerning lesions  Respiratory: Respirations even, unlabored    Assessment:       1. Sensorineural hearing loss (SNHL) of both ears    2. Vertigo    3. Tinnitus, unspecified laterality        Plan:       Discussed dysequilibrium in detail   MRI first

## 2025-08-26 ENCOUNTER — OFFICE VISIT (OUTPATIENT)
Dept: OTOLARYNGOLOGY | Facility: CLINIC | Age: 55
End: 2025-08-26
Payer: MEDICARE

## 2025-08-26 VITALS — BODY MASS INDEX: 29.35 KG/M2 | WEIGHT: 205 LBS | HEIGHT: 70 IN

## 2025-08-26 DIAGNOSIS — H90.3 SENSORINEURAL HEARING LOSS (SNHL) OF BOTH EARS: Primary | ICD-10-CM

## 2025-08-26 DIAGNOSIS — R42 VERTIGO: ICD-10-CM

## 2025-08-26 PROCEDURE — 99999 PR PBB SHADOW E&M-EST. PATIENT-LVL III: CPT | Mod: PBBFAC,,, | Performed by: OTOLARYNGOLOGY

## 2025-08-26 PROCEDURE — 3044F HG A1C LEVEL LT 7.0%: CPT | Mod: CPTII,,, | Performed by: OTOLARYNGOLOGY

## 2025-08-26 PROCEDURE — 99214 OFFICE O/P EST MOD 30 MIN: CPT | Mod: S$PBB,,, | Performed by: OTOLARYNGOLOGY

## 2025-08-26 PROCEDURE — 3066F NEPHROPATHY DOC TX: CPT | Mod: CPTII,,, | Performed by: OTOLARYNGOLOGY

## 2025-08-26 PROCEDURE — 1160F RVW MEDS BY RX/DR IN RCRD: CPT | Mod: CPTII,,, | Performed by: OTOLARYNGOLOGY

## 2025-08-26 PROCEDURE — 99213 OFFICE O/P EST LOW 20 MIN: CPT | Mod: PBBFAC | Performed by: OTOLARYNGOLOGY

## 2025-08-26 PROCEDURE — 1159F MED LIST DOCD IN RCRD: CPT | Mod: CPTII,,, | Performed by: OTOLARYNGOLOGY

## 2025-08-26 PROCEDURE — 3061F NEG MICROALBUMINURIA REV: CPT | Mod: CPTII,,, | Performed by: OTOLARYNGOLOGY

## 2025-08-26 PROCEDURE — 3008F BODY MASS INDEX DOCD: CPT | Mod: CPTII,,, | Performed by: OTOLARYNGOLOGY

## (undated) DEVICE — GLOVE SENSICARE PI GRN 6.5

## (undated) DEVICE — SLING ARM STP PAD BLUE XL 9X22

## (undated) DEVICE — DRAPE INVISISHIELD U 48X52IN

## (undated) DEVICE — TOURNIQUET SB QC DP 18X4IN

## (undated) DEVICE — NDL HYPODERMIC SAFETY 25G 1IN

## (undated) DEVICE — GLOVE SENSICARE PI SURG 6.5

## (undated) DEVICE — Device

## (undated) DEVICE — GOWN POLY REINF BRTH SLV XL

## (undated) DEVICE — SOL NACL IRR 1000ML BTL

## (undated) DEVICE — GLOVE SENSICARE PI GRN 8

## (undated) DEVICE — PADDING WYTEX UNDRCST 2INX4YD

## (undated) DEVICE — GLOVE SENSICARE PI GRN 7

## (undated) DEVICE — SOCKINETTE DOUBLE PLY 4X48IN

## (undated) DEVICE — GLOVE SENSICARE PI SURG 7.5

## (undated) DEVICE — SYR 10CC LUER LOCK

## (undated) DEVICE — APPLICATOR CHLORAPREP ORN 26ML

## (undated) DEVICE — SUT ETHILON 5-0 P3 18IN BLK

## (undated) DEVICE — BANDAGE MATRIX HK LOOP 2IN 5YD